# Patient Record
Sex: FEMALE | Race: BLACK OR AFRICAN AMERICAN | Employment: OTHER | URBAN - METROPOLITAN AREA
[De-identification: names, ages, dates, MRNs, and addresses within clinical notes are randomized per-mention and may not be internally consistent; named-entity substitution may affect disease eponyms.]

---

## 2017-04-13 ENCOUNTER — OFFICE VISIT (OUTPATIENT)
Dept: CARDIOLOGY CLINIC | Age: 57
End: 2017-04-13

## 2017-04-13 VITALS
WEIGHT: 276 LBS | BODY MASS INDEX: 45.98 KG/M2 | SYSTOLIC BLOOD PRESSURE: 128 MMHG | HEART RATE: 68 BPM | DIASTOLIC BLOOD PRESSURE: 60 MMHG

## 2017-04-13 DIAGNOSIS — I48.0 PAROXYSMAL ATRIAL FIBRILLATION (HCC): Primary | ICD-10-CM

## 2017-04-13 DIAGNOSIS — I10 ESSENTIAL HYPERTENSION: ICD-10-CM

## 2017-04-13 PROCEDURE — G8417 CALC BMI ABV UP PARAM F/U: HCPCS | Performed by: INTERNAL MEDICINE

## 2017-04-13 PROCEDURE — 3014F SCREEN MAMMO DOC REV: CPT | Performed by: INTERNAL MEDICINE

## 2017-04-13 PROCEDURE — G8428 CUR MEDS NOT DOCUMENT: HCPCS | Performed by: INTERNAL MEDICINE

## 2017-04-13 PROCEDURE — 3017F COLORECTAL CA SCREEN DOC REV: CPT | Performed by: INTERNAL MEDICINE

## 2017-04-13 PROCEDURE — 99214 OFFICE O/P EST MOD 30 MIN: CPT | Performed by: INTERNAL MEDICINE

## 2017-04-13 PROCEDURE — 1036F TOBACCO NON-USER: CPT | Performed by: INTERNAL MEDICINE

## 2017-04-13 PROCEDURE — 93000 ELECTROCARDIOGRAM COMPLETE: CPT | Performed by: INTERNAL MEDICINE

## 2017-04-20 DIAGNOSIS — I48.0 PAROXYSMAL ATRIAL FIBRILLATION (HCC): ICD-10-CM

## 2017-04-20 DIAGNOSIS — I10 ESSENTIAL HYPERTENSION: ICD-10-CM

## 2017-04-20 LAB
ALBUMIN SERPL-MCNC: 4.4 G/DL (ref 3.4–5)
ALP BLD-CCNC: 98 U/L (ref 40–129)
ALT SERPL-CCNC: 11 U/L (ref 10–40)
AST SERPL-CCNC: 9 U/L (ref 15–37)
BILIRUB SERPL-MCNC: <0.2 MG/DL (ref 0–1)
BILIRUBIN DIRECT: <0.2 MG/DL (ref 0–0.3)
BILIRUBIN, INDIRECT: ABNORMAL MG/DL (ref 0–1)
T4 FREE: 1.2 NG/DL (ref 0.9–1.8)
TOTAL PROTEIN: 7 G/DL (ref 6.4–8.2)

## 2017-04-24 ENCOUNTER — HOSPITAL ENCOUNTER (OUTPATIENT)
Dept: OTHER | Age: 57
Discharge: HOME | End: 2017-04-26
Attending: CLINICAL NURSE SPECIALIST | Admitting: CLINICAL NURSE SPECIALIST

## 2017-04-24 VITALS
RESPIRATION RATE: 18 BRPM | SYSTOLIC BLOOD PRESSURE: 174 MMHG | TEMPERATURE: 97.6 F | DIASTOLIC BLOOD PRESSURE: 76 MMHG | HEART RATE: 69 BPM

## 2017-04-24 LAB
ABO/RH: NORMAL
ANTIBODY IDENTIFICATION: NORMAL
ANTIBODY SCREEN: NORMAL
BASOPHILS ABSOLUTE: 0 K/UL (ref 0–0.2)
BASOPHILS RELATIVE PERCENT: 0.3 %
EOSINOPHILS ABSOLUTE: 0.1 K/UL (ref 0–0.6)
EOSINOPHILS RELATIVE PERCENT: 1.1 %
HCT VFR BLD CALC: 24.9 % (ref 36–48)
HEMOGLOBIN: 7.6 G/DL (ref 12–16)
LYMPHOCYTES ABSOLUTE: 1.8 K/UL (ref 1–5.1)
LYMPHOCYTES RELATIVE PERCENT: 19.1 %
MCH RBC QN AUTO: 26.4 PG (ref 26–34)
MCHC RBC AUTO-ENTMCNC: 30.6 G/DL (ref 31–36)
MCV RBC AUTO: 86.2 FL (ref 80–100)
MONOCYTES ABSOLUTE: 0.6 K/UL (ref 0–1.3)
MONOCYTES RELATIVE PERCENT: 6.3 %
NEUTROPHILS ABSOLUTE: 6.7 K/UL (ref 1.7–7.7)
NEUTROPHILS RELATIVE PERCENT: 73.2 %
PDW BLD-RTO: 17.5 % (ref 12.4–15.4)
PLATELET # BLD: 272 K/UL (ref 135–450)
PMV BLD AUTO: 8.3 FL (ref 5–10.5)
RBC # BLD: 2.89 M/UL (ref 4–5.2)
WBC # BLD: 9.2 K/UL (ref 4–11)

## 2017-04-24 RX ORDER — 0.9 % SODIUM CHLORIDE 0.9 %
250 INTRAVENOUS SOLUTION INTRAVENOUS ONCE
Status: COMPLETED | OUTPATIENT
Start: 2017-04-24 | End: 2017-04-25

## 2017-04-24 RX ADMIN — Medication 250 ML: at 13:00

## 2017-04-28 LAB
BLOOD BANK DISPENSE STATUS: NORMAL
BLOOD BANK DISPENSE STATUS: NORMAL
BLOOD BANK PRODUCT CODE: NORMAL
BLOOD BANK PRODUCT CODE: NORMAL
BPU ID: NORMAL
BPU ID: NORMAL
DESCRIPTION BLOOD BANK: NORMAL
DESCRIPTION BLOOD BANK: NORMAL

## 2017-05-08 ENCOUNTER — HOSPITAL ENCOUNTER (OUTPATIENT)
Dept: NUCLEAR MEDICINE | Age: 57
Discharge: HOME | End: 2017-05-08
Attending: CLINICAL NURSE SPECIALIST | Admitting: CLINICAL NURSE SPECIALIST

## 2017-05-08 DIAGNOSIS — D64.9 ANEMIA, UNSPECIFIED TYPE: ICD-10-CM

## 2017-05-08 DIAGNOSIS — D64.9 ANEMIA: ICD-10-CM

## 2017-05-08 RX ADMIN — Medication 10 MILLICURIE: at 09:21

## 2017-05-21 PROBLEM — N39.0 UTI (URINARY TRACT INFECTION): Status: ACTIVE | Noted: 2017-05-21

## 2017-05-24 PROBLEM — D64.9 ANEMIA: Status: ACTIVE | Noted: 2017-05-24

## 2017-06-13 ENCOUNTER — HOSPITAL ENCOUNTER (OUTPATIENT)
Dept: ULTRASOUND IMAGING | Age: 57
Discharge: HOME | End: 2017-06-13
Attending: FAMILY MEDICINE | Admitting: FAMILY MEDICINE

## 2017-06-13 DIAGNOSIS — M25.511 ACUTE PAIN OF BOTH SHOULDERS: ICD-10-CM

## 2017-06-13 DIAGNOSIS — D64.9 ANEMIA: ICD-10-CM

## 2017-06-13 DIAGNOSIS — M25.562 LEFT KNEE PAIN, UNSPECIFIED CHRONICITY: ICD-10-CM

## 2017-06-13 DIAGNOSIS — M25.512 ACUTE PAIN OF BOTH SHOULDERS: ICD-10-CM

## 2017-07-12 RX ORDER — AMIODARONE HYDROCHLORIDE 200 MG/1
TABLET ORAL
Qty: 30 TABLET | Refills: 0 | Status: SHIPPED | OUTPATIENT
Start: 2017-07-12 | End: 2017-08-03 | Stop reason: SDUPTHER

## 2017-07-20 ENCOUNTER — OFFICE VISIT (OUTPATIENT)
Dept: CARDIOLOGY CLINIC | Age: 57
End: 2017-07-20

## 2017-07-20 VITALS
BODY MASS INDEX: 44.1 KG/M2 | HEART RATE: 64 BPM | SYSTOLIC BLOOD PRESSURE: 138 MMHG | WEIGHT: 265 LBS | DIASTOLIC BLOOD PRESSURE: 60 MMHG

## 2017-07-20 DIAGNOSIS — R94.31 ABNORMAL EKG: ICD-10-CM

## 2017-07-20 DIAGNOSIS — I48.0 PAROXYSMAL ATRIAL FIBRILLATION (HCC): Primary | ICD-10-CM

## 2017-07-20 DIAGNOSIS — Z01.810 PRE-OPERATIVE CARDIOVASCULAR EXAMINATION: ICD-10-CM

## 2017-07-20 DIAGNOSIS — I10 ESSENTIAL HYPERTENSION: ICD-10-CM

## 2017-07-20 PROCEDURE — 3017F COLORECTAL CA SCREEN DOC REV: CPT | Performed by: INTERNAL MEDICINE

## 2017-07-20 PROCEDURE — 3014F SCREEN MAMMO DOC REV: CPT | Performed by: INTERNAL MEDICINE

## 2017-07-20 PROCEDURE — G8417 CALC BMI ABV UP PARAM F/U: HCPCS | Performed by: INTERNAL MEDICINE

## 2017-07-20 PROCEDURE — G8427 DOCREV CUR MEDS BY ELIG CLIN: HCPCS | Performed by: INTERNAL MEDICINE

## 2017-07-20 PROCEDURE — 99214 OFFICE O/P EST MOD 30 MIN: CPT | Performed by: INTERNAL MEDICINE

## 2017-07-20 PROCEDURE — 1036F TOBACCO NON-USER: CPT | Performed by: INTERNAL MEDICINE

## 2017-08-07 RX ORDER — AMIODARONE HYDROCHLORIDE 200 MG/1
TABLET ORAL
Qty: 30 TABLET | Refills: 6 | Status: SHIPPED | OUTPATIENT
Start: 2017-08-07 | End: 2018-05-08 | Stop reason: SDUPTHER

## 2017-09-28 ENCOUNTER — TELEPHONE (OUTPATIENT)
Dept: CARDIOLOGY CLINIC | Age: 57
End: 2017-09-28

## 2017-11-27 ENCOUNTER — OFFICE VISIT (OUTPATIENT)
Dept: CARDIOLOGY CLINIC | Age: 57
End: 2017-11-27

## 2017-11-27 VITALS
SYSTOLIC BLOOD PRESSURE: 120 MMHG | WEIGHT: 265 LBS | DIASTOLIC BLOOD PRESSURE: 60 MMHG | BODY MASS INDEX: 44.1 KG/M2 | HEART RATE: 58 BPM

## 2017-11-27 DIAGNOSIS — I10 ESSENTIAL HYPERTENSION: ICD-10-CM

## 2017-11-27 DIAGNOSIS — R94.31 ABNORMAL EKG: ICD-10-CM

## 2017-11-27 DIAGNOSIS — I48.0 PAROXYSMAL ATRIAL FIBRILLATION (HCC): Primary | ICD-10-CM

## 2017-11-27 PROCEDURE — 99214 OFFICE O/P EST MOD 30 MIN: CPT | Performed by: INTERNAL MEDICINE

## 2017-11-27 PROCEDURE — G8484 FLU IMMUNIZE NO ADMIN: HCPCS | Performed by: INTERNAL MEDICINE

## 2017-11-27 PROCEDURE — G8417 CALC BMI ABV UP PARAM F/U: HCPCS | Performed by: INTERNAL MEDICINE

## 2017-11-27 PROCEDURE — 1036F TOBACCO NON-USER: CPT | Performed by: INTERNAL MEDICINE

## 2017-11-27 PROCEDURE — 3014F SCREEN MAMMO DOC REV: CPT | Performed by: INTERNAL MEDICINE

## 2017-11-27 PROCEDURE — 3017F COLORECTAL CA SCREEN DOC REV: CPT | Performed by: INTERNAL MEDICINE

## 2017-11-27 PROCEDURE — G8427 DOCREV CUR MEDS BY ELIG CLIN: HCPCS | Performed by: INTERNAL MEDICINE

## 2017-11-27 NOTE — PROGRESS NOTES
Subjective:      Patient ID: Jose Michaels is a 59 y.o. female. HPI Mrs. Jenny Keys is here today for follow up  for afib/BP/abn ekg. Rhythm stable. No tachycardia. No syncope. BP good. No pnd or orthopnea. No palp/syncope. Broke leg after falling stairs. Past Medical History:   Diagnosis Date    Chronic kidney disease     Diabetes mellitus ([facility] [address]     Hyperlipidemia     Hypertension     MDRO (multiple drug resistant organisms) resistance 6/3/2016    E Coli-urine     Past Surgical History:   Procedure Laterality Date    COLONOSCOPY      ENDOSCOPY, COLON, DIAGNOSTIC      TUBAL LIGATION           Allergies   Allergen Reactions    Warfarin And Related      Other          Social History     Social History    Marital status: Single     Spouse name: N/A    Number of children: N/A    Years of education: N/A     Occupational History    Not on file. Social History Main Topics    Smoking status: Former Smoker     Types: Cigarettes     Quit date: 12/30/2016    Smokeless tobacco: Not on file    Alcohol use Yes      Comment: about 1 drink weekly    Drug use: No    Sexual activity: Not on file     Other Topics Concern    Not on file     Social History Narrative    No narrative on file        [facility] reviewed      Patient  has a past medical history of Chronic kidney disease; Diabetes mellitus ([facility] [address] Hyperlipidemia; Hypertension; and MDRO (multiple drug resistant organisms) resistance. Current Outpatient Prescriptions   Medication Sig Dispense Refill    albuterol sulfate HFA (PROVENTIL HFA) 108 (90 Base) MCG/ACT inhaler Inhale 2 puffs into the lungs every 4 hours as needed for Wheezing or Shortness of Breath (Space out to every 6 hours as symptoms improve) Space out to every 6 hours as symptoms improve.  1 Inhaler 0    amiodarone (CORDARONE) 200 MG tablet TAKE 1 TABLET BY MOUTH DAILY 30 tablet 6    insulin glargine (LANTUS) 100 UNIT/ML injection vial Inject 30 Units into the skin every morning 1 vial 3    insulin glargine (LANTUS) 100 UNIT/ML injection vial Inject 50 Units into the skin nightly 1 vial 3    Blood Glucose Monitoring Suppl (ACURA BLOOD GLUCOSE METER) W/DEVICE KIT 1 kit by Does not apply route as needed (Glucose check) Per insurance coverage 1 kit 3    Glucose Blood (BLOOD GLUCOSE TEST STRIPS) STRP Per insurance coverage. 100 strip 3    Accu-Chek Multiclix Lancets MISC 1 Package by Does not apply route as needed (Glucose check) Per insurance coverage 100 each 3    HYDROcodone-acetaminophen (NORCO) 7.5-325 MG per tablet Take 1 tablet by mouth every 6 hours as needed for Pain .  latanoprost (XALATAN) 0.005 % ophthalmic solution Place 1 drop into both eyes nightly      ferrous sulfate 325 (65 FE) MG tablet Take 325 mg by mouth daily (with breakfast)      losartan (COZAAR) 100 MG tablet TAKE ONE TABLET BY MOUTH NIGHTLY 30 tablet 6    amLODIPine (NORVASC) 5 MG tablet Take 1 tablet by mouth daily 30 tablet 3    carvedilol (COREG) 25 MG tablet Take 1 tablet by mouth 2 times daily (Patient taking differently: Take 12.5 mg by mouth 2 times daily ) 60 tablet 2    famotidine (PEPCID) 40 MG tablet Take 1 tablet by mouth 2 times daily 60 tablet 3    atorvastatin (LIPITOR) 40 MG tablet Take 40 mg by mouth daily      gabapentin (NEURONTIN) 800 MG tablet Take 800 mg by mouth 3 times daily       No current facility-administered medications for this visit. Vitals  Weight:    Blood Pressure:     Pulse:           Review of Systems   Constitutional: Negative for activity change, appetite change and fatigue. Respiratory: Negative for cough, choking, chest tightness and shortness of breath. Cardiovascular: Negative for chest pain, palpitations and leg swelling. Denies PND or orthopnea. No tachycardia or syncope. Neurological: Negative for dizziness, syncope and headaches. Psychiatric/Behavioral: Negative for agitation, behavioral problems and confusion.    Other systems

## 2018-02-06 ENCOUNTER — OFFICE VISIT (OUTPATIENT)
Dept: CARDIOLOGY CLINIC | Age: 58
End: 2018-02-06

## 2018-02-06 VITALS
SYSTOLIC BLOOD PRESSURE: 120 MMHG | BODY MASS INDEX: 46.39 KG/M2 | WEIGHT: 278.8 LBS | DIASTOLIC BLOOD PRESSURE: 70 MMHG | HEART RATE: 60 BPM

## 2018-02-06 DIAGNOSIS — I10 ESSENTIAL HYPERTENSION: ICD-10-CM

## 2018-02-06 DIAGNOSIS — I48.0 PAROXYSMAL ATRIAL FIBRILLATION (HCC): Primary | ICD-10-CM

## 2018-02-06 DIAGNOSIS — R94.31 ABNORMAL EKG: ICD-10-CM

## 2018-02-06 PROCEDURE — G8484 FLU IMMUNIZE NO ADMIN: HCPCS | Performed by: INTERNAL MEDICINE

## 2018-02-06 PROCEDURE — 3014F SCREEN MAMMO DOC REV: CPT | Performed by: INTERNAL MEDICINE

## 2018-02-06 PROCEDURE — 99214 OFFICE O/P EST MOD 30 MIN: CPT | Performed by: INTERNAL MEDICINE

## 2018-02-06 PROCEDURE — G8427 DOCREV CUR MEDS BY ELIG CLIN: HCPCS | Performed by: INTERNAL MEDICINE

## 2018-02-06 PROCEDURE — G8417 CALC BMI ABV UP PARAM F/U: HCPCS | Performed by: INTERNAL MEDICINE

## 2018-02-06 PROCEDURE — 1036F TOBACCO NON-USER: CPT | Performed by: INTERNAL MEDICINE

## 2018-02-06 PROCEDURE — 3017F COLORECTAL CA SCREEN DOC REV: CPT | Performed by: INTERNAL MEDICINE

## 2018-02-06 NOTE — PROGRESS NOTES
vial 3    Blood Glucose Monitoring Suppl (ACURA BLOOD GLUCOSE METER) W/DEVICE KIT 1 kit by Does not apply route as needed (Glucose check) Per insurance coverage 1 kit 3    Glucose Blood (BLOOD GLUCOSE TEST STRIPS) STRP Per insurance coverage. 100 strip 3    Accu-Chek Multiclix Lancets MISC 1 Package by Does not apply route as needed (Glucose check) Per insurance coverage 100 each 3    HYDROcodone-acetaminophen (NORCO) 7.5-325 MG per tablet Take 1 tablet by mouth every 6 hours as needed for Pain .  latanoprost (XALATAN) 0.005 % ophthalmic solution Place 1 drop into both eyes nightly      ferrous sulfate 325 (65 FE) MG tablet Take 325 mg by mouth daily (with breakfast)      losartan (COZAAR) 100 MG tablet TAKE ONE TABLET BY MOUTH NIGHTLY 30 tablet 6    amLODIPine (NORVASC) 5 MG tablet Take 1 tablet by mouth daily 30 tablet 3    carvedilol (COREG) 25 MG tablet Take 1 tablet by mouth 2 times daily 60 tablet 2    famotidine (PEPCID) 40 MG tablet Take 1 tablet by mouth 2 times daily 60 tablet 3    atorvastatin (LIPITOR) 40 MG tablet Take 40 mg by mouth daily      gabapentin (NEURONTIN) 800 MG tablet Take 800 mg by mouth 3 times daily      insulin glargine (LANTUS) 100 UNIT/ML injection vial Inject 50 Units into the skin nightly 1 vial 3     No current facility-administered medications for this visit. Vitals  Weight: 278 lb 12.8 oz (126.5 kg)  Blood Pressure: BP: (120)/(70)    Pulse: 60         Review of Systems   Constitutional: Negative for activity change, appetite change and fatigue. Respiratory: Negative for cough, choking, chest tightness and shortness of breath. Cardiovascular: Negative for chest pain, palpitations and leg swelling. Denies PND or orthopnea. No tachycardia or syncope. Neurological: Negative for dizziness, syncope and headaches. Psychiatric/Behavioral: Negative for agitation, behavioral problems and confusion. Other systems reviewed negative as done.

## 2018-05-08 ENCOUNTER — OFFICE VISIT (OUTPATIENT)
Dept: CARDIOLOGY CLINIC | Age: 58
End: 2018-05-08

## 2018-05-08 VITALS
WEIGHT: 285 LBS | DIASTOLIC BLOOD PRESSURE: 70 MMHG | BODY MASS INDEX: 47.43 KG/M2 | HEART RATE: 70 BPM | SYSTOLIC BLOOD PRESSURE: 132 MMHG

## 2018-05-08 DIAGNOSIS — I48.0 PAROXYSMAL ATRIAL FIBRILLATION (HCC): Primary | ICD-10-CM

## 2018-05-08 DIAGNOSIS — I10 ESSENTIAL HYPERTENSION: ICD-10-CM

## 2018-05-08 DIAGNOSIS — R94.31 ABNORMAL EKG: ICD-10-CM

## 2018-05-08 PROCEDURE — 1036F TOBACCO NON-USER: CPT | Performed by: INTERNAL MEDICINE

## 2018-05-08 PROCEDURE — G8417 CALC BMI ABV UP PARAM F/U: HCPCS | Performed by: INTERNAL MEDICINE

## 2018-05-08 PROCEDURE — 99214 OFFICE O/P EST MOD 30 MIN: CPT | Performed by: INTERNAL MEDICINE

## 2018-05-08 PROCEDURE — G8427 DOCREV CUR MEDS BY ELIG CLIN: HCPCS | Performed by: INTERNAL MEDICINE

## 2018-05-08 PROCEDURE — 3017F COLORECTAL CA SCREEN DOC REV: CPT | Performed by: INTERNAL MEDICINE

## 2018-05-08 RX ORDER — AMIODARONE HYDROCHLORIDE 200 MG/1
200 TABLET ORAL DAILY
Qty: 30 TABLET | Refills: 5 | Status: SHIPPED | OUTPATIENT
Start: 2018-05-08 | End: 2018-12-04 | Stop reason: SDUPTHER

## 2018-08-23 ENCOUNTER — OFFICE VISIT (OUTPATIENT)
Dept: CARDIOLOGY CLINIC | Age: 58
End: 2018-08-23

## 2018-08-23 VITALS
SYSTOLIC BLOOD PRESSURE: 150 MMHG | BODY MASS INDEX: 44.6 KG/M2 | DIASTOLIC BLOOD PRESSURE: 80 MMHG | WEIGHT: 268 LBS | HEART RATE: 70 BPM

## 2018-08-23 DIAGNOSIS — R94.31 ABNORMAL EKG: ICD-10-CM

## 2018-08-23 DIAGNOSIS — I48.0 PAROXYSMAL ATRIAL FIBRILLATION (HCC): Primary | ICD-10-CM

## 2018-08-23 DIAGNOSIS — I10 ESSENTIAL HYPERTENSION: ICD-10-CM

## 2018-08-23 PROCEDURE — 3017F COLORECTAL CA SCREEN DOC REV: CPT | Performed by: INTERNAL MEDICINE

## 2018-08-23 PROCEDURE — 1036F TOBACCO NON-USER: CPT | Performed by: INTERNAL MEDICINE

## 2018-08-23 PROCEDURE — 99214 OFFICE O/P EST MOD 30 MIN: CPT | Performed by: INTERNAL MEDICINE

## 2018-08-23 PROCEDURE — G8427 DOCREV CUR MEDS BY ELIG CLIN: HCPCS | Performed by: INTERNAL MEDICINE

## 2018-08-23 PROCEDURE — G8417 CALC BMI ABV UP PARAM F/U: HCPCS | Performed by: INTERNAL MEDICINE

## 2018-09-26 PROBLEM — N39.0 UTI (URINARY TRACT INFECTION): Status: RESOLVED | Noted: 2017-05-21 | Resolved: 2018-09-26

## 2018-11-28 ENCOUNTER — HOSPITAL ENCOUNTER (EMERGENCY)
Age: 58
Discharge: HOME | End: 2018-11-28
Attending: EMERGENCY MEDICINE
Payer: MEDICARE

## 2018-11-28 ENCOUNTER — APPOINTMENT (OUTPATIENT)
Dept: GENERAL RADIOLOGY | Age: 58
End: 2018-11-28
Payer: MEDICARE

## 2018-11-28 VITALS
SYSTOLIC BLOOD PRESSURE: 170 MMHG | DIASTOLIC BLOOD PRESSURE: 66 MMHG | HEART RATE: 68 BPM | RESPIRATION RATE: 18 BRPM | WEIGHT: 264 LBS | BODY MASS INDEX: 43.99 KG/M2 | TEMPERATURE: 98.6 F | HEIGHT: 65 IN | OXYGEN SATURATION: 99 %

## 2018-11-28 DIAGNOSIS — M25.561 RIGHT KNEE PAIN, UNSPECIFIED CHRONICITY: Primary | ICD-10-CM

## 2018-11-28 PROCEDURE — 99283 EMERGENCY DEPT VISIT LOW MDM: CPT

## 2018-11-28 PROCEDURE — 73560 X-RAY EXAM OF KNEE 1 OR 2: CPT

## 2018-11-28 RX ORDER — LIDOCAINE 50 MG/G
OINTMENT TOPICAL
Qty: 30 G | Refills: 0 | Status: SHIPPED | OUTPATIENT
Start: 2018-11-28 | End: 2019-04-04 | Stop reason: CLARIF

## 2018-11-28 ASSESSMENT — PAIN DESCRIPTION - ORIENTATION: ORIENTATION: RIGHT

## 2018-11-28 ASSESSMENT — ENCOUNTER SYMPTOMS
NAUSEA: 0
SORE THROAT: 0
WHEEZING: 0
VOMITING: 0
ABDOMINAL PAIN: 0
SHORTNESS OF BREATH: 0
COUGH: 0

## 2018-11-28 ASSESSMENT — PAIN DESCRIPTION - PAIN TYPE: TYPE: ACUTE PAIN

## 2018-11-28 ASSESSMENT — PAIN SCALES - GENERAL: PAINLEVEL_OUTOF10: 10

## 2018-11-28 ASSESSMENT — PAIN DESCRIPTION - LOCATION: LOCATION: KNEE

## 2018-11-28 ASSESSMENT — PAIN DESCRIPTION - DESCRIPTORS: DESCRIPTORS: ACHING;CONSTANT

## 2018-11-28 NOTE — ED PROVIDER NOTES
about 22 months ago. Her smoking use included Cigarettes. She has never used smokeless tobacco. She reports that she drinks alcohol. She reports that she does not use drugs. Medications     Previous Medications    ACCU-CHEK MULTICLIX LANCETS MISC    1 Package by Does not apply route as needed (Glucose check) Per insurance coverage    ALBUTEROL SULFATE HFA (PROVENTIL HFA) 108 (90 BASE) MCG/ACT INHALER    Inhale 2 puffs into the lungs every 4 hours as needed for Wheezing or Shortness of Breath (Space out to every 6 hours as symptoms improve) Space out to every 6 hours as symptoms improve. AMIODARONE (CORDARONE) 200 MG TABLET    Take 1 tablet by mouth daily    AMLODIPINE (NORVASC) 5 MG TABLET    Take 1 tablet by mouth daily    ATORVASTATIN (LIPITOR) 40 MG TABLET    Take 40 mg by mouth daily    BLOOD GLUCOSE MONITORING SUPPL (ACURA BLOOD GLUCOSE METER) W/DEVICE KIT    1 kit by Does not apply route as needed (Glucose check) Per insurance coverage    CARVEDILOL (COREG) 25 MG TABLET    Take 1 tablet by mouth 2 times daily    FAMOTIDINE (PEPCID) 40 MG TABLET    Take 1 tablet by mouth 2 times daily    FERROUS SULFATE 325 (65 FE) MG TABLET    Take 325 mg by mouth daily (with breakfast)    FUROSEMIDE (LASIX) 20 MG TABLET    Take 1 tablet by mouth daily    GABAPENTIN (NEURONTIN) 800 MG TABLET    Take 800 mg by mouth 3 times daily    GLUCOSE BLOOD (BLOOD GLUCOSE TEST STRIPS) STRP    Per insurance coverage. HYDROCODONE-ACETAMINOPHEN (NORCO) 7.5-325 MG PER TABLET    Take 1 tablet by mouth every 6 hours as needed for Pain . INSULIN GLARGINE (LANTUS) 100 UNIT/ML INJECTION VIAL    Inject 30 Units into the skin every morning    INSULIN GLARGINE (LANTUS) 100 UNIT/ML INJECTION VIAL    Inject 50 Units into the skin nightly    LATANOPROST (XALATAN) 0.005 % OPHTHALMIC SOLUTION    Place 1 drop into both eyes nightly       Allergies     She is allergic to warfarin and related.     Physical Exam     INITIAL VITALS: BP: (!) 183/67, She stepped off a curb yesterday and felt medial knee joint pain radiating to her popliteal fossa. She is able to bear weight today and walk at her baseline with her cane. No significant knee effusion, erythema, or warmth. Range of motion intact. Secondary to patient's body habitus, laxity in valgus and varus stress was inconclusive. X-ray of the knee or moderate tricompartmental effusion without obvious fracture. Most consistent with acute on chronic osteoarthritis. She will continue her home narcotic pain regimen. She was given lidocaine cream.  She provided with a prescription for a walker should she need it. She'll follow-up with her orthopedic surgeon. This patient was also evaluated by the attending physician. All care plans were discussed and agreed upon. Clinical Impression     1. Right knee pain, unspecified chronicity        Disposition     PATIENT REFERRED TO:  Maggie Spann MD  [address] [address]     Schedule an appointment as soon as possible for a visit         DISCHARGE MEDICATIONS:  New Prescriptions    LIDOCAINE (XYLOCAINE) 5 % OINTMENT    Apply topically as needed. MISC.  DEVICES (ROLLER WALKER) MISC    1 each by Does not apply route daily       DISPOSITION Decision To Discharge 11/28/2018 06:04:52 PM        Dinorah Heller PA-C  11/28/18 [id number]

## 2018-12-04 RX ORDER — AMIODARONE HYDROCHLORIDE 200 MG/1
200 TABLET ORAL DAILY
Qty: 30 TABLET | Refills: 5 | Status: SHIPPED | OUTPATIENT
Start: 2018-12-04 | End: 2019-06-09 | Stop reason: SDUPTHER

## 2019-01-29 ENCOUNTER — OFFICE VISIT (OUTPATIENT)
Dept: CARDIOLOGY CLINIC | Age: 59
End: 2019-01-29
Payer: MEDICARE

## 2019-01-29 VITALS
SYSTOLIC BLOOD PRESSURE: 130 MMHG | DIASTOLIC BLOOD PRESSURE: 70 MMHG | BODY MASS INDEX: 46.59 KG/M2 | HEART RATE: 68 BPM | WEIGHT: 280 LBS

## 2019-01-29 DIAGNOSIS — R94.31 ABNORMAL EKG: ICD-10-CM

## 2019-01-29 DIAGNOSIS — I10 ESSENTIAL HYPERTENSION: ICD-10-CM

## 2019-01-29 DIAGNOSIS — I48.0 PAROXYSMAL ATRIAL FIBRILLATION (HCC): Primary | ICD-10-CM

## 2019-01-29 PROCEDURE — G8428 CUR MEDS NOT DOCUMENT: HCPCS | Performed by: INTERNAL MEDICINE

## 2019-01-29 PROCEDURE — 1036F TOBACCO NON-USER: CPT | Performed by: INTERNAL MEDICINE

## 2019-01-29 PROCEDURE — 3017F COLORECTAL CA SCREEN DOC REV: CPT | Performed by: INTERNAL MEDICINE

## 2019-01-29 PROCEDURE — 99214 OFFICE O/P EST MOD 30 MIN: CPT | Performed by: INTERNAL MEDICINE

## 2019-01-29 PROCEDURE — G8484 FLU IMMUNIZE NO ADMIN: HCPCS | Performed by: INTERNAL MEDICINE

## 2019-01-29 PROCEDURE — G8417 CALC BMI ABV UP PARAM F/U: HCPCS | Performed by: INTERNAL MEDICINE

## 2019-01-29 RX ORDER — VARENICLINE TARTRATE 0.5 MG/1
.5-1 TABLET, FILM COATED ORAL SEE ADMIN INSTRUCTIONS
Qty: 57 TABLET | Refills: 0 | Status: SHIPPED | OUTPATIENT
Start: 2019-01-29 | End: 2019-04-15

## 2019-03-12 ENCOUNTER — INITIAL CONSULT (OUTPATIENT)
Dept: SURGERY | Age: 59
End: 2019-03-12
Payer: MEDICARE

## 2019-03-12 VITALS
SYSTOLIC BLOOD PRESSURE: 166 MMHG | OXYGEN SATURATION: 98 % | WEIGHT: 272 LBS | DIASTOLIC BLOOD PRESSURE: 79 MMHG | BODY MASS INDEX: 45.26 KG/M2 | HEART RATE: 68 BPM

## 2019-03-12 DIAGNOSIS — I87.323 CHRONIC VENOUS HTN W INFLAMMATION OF BILATERAL LOW EXTRM: ICD-10-CM

## 2019-03-12 DIAGNOSIS — M79.89 LEG SWELLING: Primary | ICD-10-CM

## 2019-03-12 DIAGNOSIS — I87.2 VENOUS INSUFFICIENCY: ICD-10-CM

## 2019-03-12 PROCEDURE — G8484 FLU IMMUNIZE NO ADMIN: HCPCS | Performed by: SURGERY

## 2019-03-12 PROCEDURE — G8427 DOCREV CUR MEDS BY ELIG CLIN: HCPCS | Performed by: SURGERY

## 2019-03-12 PROCEDURE — 3017F COLORECTAL CA SCREEN DOC REV: CPT | Performed by: SURGERY

## 2019-03-12 PROCEDURE — 99203 OFFICE O/P NEW LOW 30 MIN: CPT | Performed by: SURGERY

## 2019-03-12 PROCEDURE — G8417 CALC BMI ABV UP PARAM F/U: HCPCS | Performed by: SURGERY

## 2019-03-12 ASSESSMENT — ENCOUNTER SYMPTOMS
EYES NEGATIVE: 1
RESPIRATORY NEGATIVE: 1
ALLERGIC/IMMUNOLOGIC NEGATIVE: 1
GASTROINTESTINAL NEGATIVE: 1
COLOR CHANGE: 1

## 2019-04-04 ENCOUNTER — APPOINTMENT (OUTPATIENT)
Dept: GENERAL RADIOLOGY | Age: 59
DRG: 871 | End: 2019-04-04
Payer: MEDICARE

## 2019-04-04 ENCOUNTER — HOSPITAL ENCOUNTER (INPATIENT)
Age: 59
LOS: 5 days | Discharge: HOME | DRG: 871 | End: 2019-04-09
Attending: EMERGENCY MEDICINE | Admitting: INTERNAL MEDICINE
Payer: MEDICARE

## 2019-04-04 DIAGNOSIS — I50.9 ACUTE HEART FAILURE, UNSPECIFIED HEART FAILURE TYPE (HCC): Primary | ICD-10-CM

## 2019-04-04 DIAGNOSIS — J11.1 INFLUENZA: ICD-10-CM

## 2019-04-04 PROBLEM — I50.43 CHF (CONGESTIVE HEART FAILURE), NYHA CLASS I, ACUTE ON CHRONIC, COMBINED (HCC): Status: ACTIVE | Noted: 2019-04-04

## 2019-04-04 LAB
ALBUMIN SERPL-MCNC: 3.9 G/DL (ref 3.4–5)
ALP BLD-CCNC: 65 U/L (ref 40–129)
ALT SERPL-CCNC: 16 U/L (ref 10–40)
ANION GAP SERPL CALCULATED.3IONS-SCNC: 14 MMOL/L (ref 3–16)
AST SERPL-CCNC: 24 U/L (ref 15–37)
BASE EXCESS VENOUS: -5 (ref -3–3)
BASOPHILS ABSOLUTE: 0 K/UL (ref 0–0.2)
BASOPHILS RELATIVE PERCENT: 0.5 %
BILIRUB SERPL-MCNC: <0.2 MG/DL (ref 0–1)
BILIRUBIN DIRECT: <0.2 MG/DL (ref 0–0.3)
BILIRUBIN, INDIRECT: NORMAL MG/DL (ref 0–1)
BUN BLDV-MCNC: 26 MG/DL (ref 7–20)
CALCIUM SERPL-MCNC: 9.1 MG/DL (ref 8.3–10.6)
CHLORIDE BLD-SCNC: 106 MMOL/L (ref 99–110)
CO2: 19 MMOL/L (ref 21–32)
CREAT SERPL-MCNC: 2.3 MG/DL (ref 0.6–1.2)
EKG ATRIAL RATE: 86 BPM
EKG DIAGNOSIS: NORMAL
EKG P AXIS: 37 DEGREES
EKG P-R INTERVAL: 152 MS
EKG Q-T INTERVAL: 388 MS
EKG QRS DURATION: 82 MS
EKG QTC CALCULATION (BAZETT): 464 MS
EKG R AXIS: 66 DEGREES
EKG T AXIS: 35 DEGREES
EKG VENTRICULAR RATE: 86 BPM
EOSINOPHILS ABSOLUTE: 0 K/UL (ref 0–0.6)
EOSINOPHILS RELATIVE PERCENT: 0 %
GFR AFRICAN AMERICAN: 26
GFR NON-AFRICAN AMERICAN: 22
GLUCOSE BLD-MCNC: 159 MG/DL (ref 70–99)
GLUCOSE BLD-MCNC: 195 MG/DL (ref 70–99)
HCO3 VENOUS: 20.2 MMOL/L (ref 23–29)
HCT VFR BLD CALC: 28.4 % (ref 36–48)
HEMOGLOBIN: 9 G/DL (ref 12–16)
LACTATE: 0.51 MMOL/L (ref 0.4–2)
LIPASE: 23 U/L (ref 13–60)
LYMPHOCYTES ABSOLUTE: 1 K/UL (ref 1–5.1)
LYMPHOCYTES RELATIVE PERCENT: 11.4 %
MCH RBC QN AUTO: 27.4 PG (ref 26–34)
MCHC RBC AUTO-ENTMCNC: 31.8 G/DL (ref 31–36)
MCV RBC AUTO: 86.1 FL (ref 80–100)
MONOCYTES ABSOLUTE: 1 K/UL (ref 0–1.3)
MONOCYTES RELATIVE PERCENT: 10.8 %
NEUTROPHILS ABSOLUTE: 6.9 K/UL (ref 1.7–7.7)
NEUTROPHILS RELATIVE PERCENT: 77.3 %
O2 SAT, VEN: 86 %
PCO2, VEN: 35.8 MM HG (ref 40–50)
PDW BLD-RTO: 19.4 % (ref 12.4–15.4)
PERFORMED ON: ABNORMAL
PERFORMED ON: ABNORMAL
PH VENOUS: 7.36 (ref 7.35–7.45)
PLATELET # BLD: 224 K/UL (ref 135–450)
PMV BLD AUTO: 8 FL (ref 5–10.5)
PO2, VEN: 52 MM HG
POC SAMPLE TYPE: ABNORMAL
POTASSIUM REFLEX MAGNESIUM: 4.5 MMOL/L (ref 3.5–5.1)
PRO-BNP: 2340 PG/ML (ref 0–124)
PROCALCITONIN: 0.17 NG/ML (ref 0–0.15)
RAPID INFLUENZA  B AGN: NEGATIVE
RAPID INFLUENZA A AGN: POSITIVE
RBC # BLD: 3.3 M/UL (ref 4–5.2)
SODIUM BLD-SCNC: 139 MMOL/L (ref 136–145)
TCO2 CALC VENOUS: 21 MMOL/L
TOTAL PROTEIN: 7.7 G/DL (ref 6.4–8.2)
TROPONIN: <0.01 NG/ML
WBC # BLD: 9 K/UL (ref 4–11)

## 2019-04-04 PROCEDURE — 87804 INFLUENZA ASSAY W/OPTIC: CPT

## 2019-04-04 PROCEDURE — 94640 AIRWAY INHALATION TREATMENT: CPT

## 2019-04-04 PROCEDURE — 93005 ELECTROCARDIOGRAM TRACING: CPT | Performed by: PHYSICIAN ASSISTANT

## 2019-04-04 PROCEDURE — 85025 COMPLETE CBC W/AUTO DIFF WBC: CPT

## 2019-04-04 PROCEDURE — 83690 ASSAY OF LIPASE: CPT

## 2019-04-04 PROCEDURE — 83605 ASSAY OF LACTIC ACID: CPT

## 2019-04-04 PROCEDURE — 94667 MNPJ CHEST WALL 1ST: CPT

## 2019-04-04 PROCEDURE — 84145 PROCALCITONIN (PCT): CPT

## 2019-04-04 PROCEDURE — 2700000000 HC OXYGEN THERAPY PER DAY

## 2019-04-04 PROCEDURE — 94761 N-INVAS EAR/PLS OXIMETRY MLT: CPT

## 2019-04-04 PROCEDURE — 6370000000 HC RX 637 (ALT 250 FOR IP): Performed by: PHYSICIAN ASSISTANT

## 2019-04-04 PROCEDURE — 80048 BASIC METABOLIC PNL TOTAL CA: CPT

## 2019-04-04 PROCEDURE — 2580000003 HC RX 258: Performed by: INTERNAL MEDICINE

## 2019-04-04 PROCEDURE — 84484 ASSAY OF TROPONIN QUANT: CPT

## 2019-04-04 PROCEDURE — 87040 BLOOD CULTURE FOR BACTERIA: CPT

## 2019-04-04 PROCEDURE — 6370000000 HC RX 637 (ALT 250 FOR IP): Performed by: INTERNAL MEDICINE

## 2019-04-04 PROCEDURE — 1200000000 HC SEMI PRIVATE

## 2019-04-04 PROCEDURE — 94664 DEMO&/EVAL PT USE INHALER: CPT

## 2019-04-04 PROCEDURE — 96365 THER/PROPH/DIAG IV INF INIT: CPT

## 2019-04-04 PROCEDURE — 2580000003 HC RX 258: Performed by: PHYSICIAN ASSISTANT

## 2019-04-04 PROCEDURE — 96375 TX/PRO/DX INJ NEW DRUG ADDON: CPT

## 2019-04-04 PROCEDURE — 83880 ASSAY OF NATRIURETIC PEPTIDE: CPT

## 2019-04-04 PROCEDURE — 80076 HEPATIC FUNCTION PANEL: CPT

## 2019-04-04 PROCEDURE — 71046 X-RAY EXAM CHEST 2 VIEWS: CPT

## 2019-04-04 PROCEDURE — 82803 BLOOD GASES ANY COMBINATION: CPT

## 2019-04-04 PROCEDURE — 36415 COLL VENOUS BLD VENIPUNCTURE: CPT

## 2019-04-04 PROCEDURE — 99285 EMERGENCY DEPT VISIT HI MDM: CPT

## 2019-04-04 PROCEDURE — 96361 HYDRATE IV INFUSION ADD-ON: CPT

## 2019-04-04 PROCEDURE — 6360000002 HC RX W HCPCS: Performed by: PHYSICIAN ASSISTANT

## 2019-04-04 PROCEDURE — 6360000002 HC RX W HCPCS: Performed by: INTERNAL MEDICINE

## 2019-04-04 RX ORDER — GABAPENTIN 400 MG/1
400 CAPSULE ORAL 3 TIMES DAILY
Status: DISCONTINUED | OUTPATIENT
Start: 2019-04-04 | End: 2019-04-09 | Stop reason: HOSPADM

## 2019-04-04 RX ORDER — DOXYCYCLINE HYCLATE 50 MG/1
324 CAPSULE, GELATIN COATED ORAL 2 TIMES DAILY WITH MEALS
Status: ON HOLD | COMMUNITY
End: 2020-03-30 | Stop reason: HOSPADM

## 2019-04-04 RX ORDER — IPRATROPIUM BROMIDE AND ALBUTEROL SULFATE 2.5; .5 MG/3ML; MG/3ML
1 SOLUTION RESPIRATORY (INHALATION) ONCE
Status: COMPLETED | OUTPATIENT
Start: 2019-04-04 | End: 2019-04-04

## 2019-04-04 RX ORDER — FAMOTIDINE 20 MG/1
20 TABLET, FILM COATED ORAL DAILY
Status: DISCONTINUED | OUTPATIENT
Start: 2019-04-04 | End: 2019-04-09 | Stop reason: HOSPADM

## 2019-04-04 RX ORDER — ATORVASTATIN CALCIUM 40 MG/1
40 TABLET, FILM COATED ORAL DAILY
Status: DISCONTINUED | OUTPATIENT
Start: 2019-04-04 | End: 2019-04-09 | Stop reason: HOSPADM

## 2019-04-04 RX ORDER — SODIUM CHLORIDE 9 MG/ML
INJECTION, SOLUTION INTRAVENOUS
Status: DISCONTINUED
Start: 2019-04-04 | End: 2019-04-05

## 2019-04-04 RX ORDER — GUAIFENESIN/DEXTROMETHORPHAN 100-10MG/5
5 SYRUP ORAL EVERY 4 HOURS PRN
Status: DISCONTINUED | OUTPATIENT
Start: 2019-04-04 | End: 2019-04-09 | Stop reason: HOSPADM

## 2019-04-04 RX ORDER — FERROUS GLUCONATE 324(37.5)
324 TABLET ORAL 2 TIMES DAILY WITH MEALS
Status: DISCONTINUED | OUTPATIENT
Start: 2019-04-04 | End: 2019-04-09 | Stop reason: HOSPADM

## 2019-04-04 RX ORDER — AMIODARONE HYDROCHLORIDE 200 MG/1
200 TABLET ORAL DAILY
Status: DISCONTINUED | OUTPATIENT
Start: 2019-04-04 | End: 2019-04-09 | Stop reason: HOSPADM

## 2019-04-04 RX ORDER — ONDANSETRON 2 MG/ML
4 INJECTION INTRAMUSCULAR; INTRAVENOUS EVERY 6 HOURS PRN
Status: DISCONTINUED | OUTPATIENT
Start: 2019-04-04 | End: 2019-04-09 | Stop reason: HOSPADM

## 2019-04-04 RX ORDER — HYDROCODONE BITARTRATE AND ACETAMINOPHEN 7.5; 325 MG/1; MG/1
1 TABLET ORAL EVERY 6 HOURS PRN
Status: DISCONTINUED | OUTPATIENT
Start: 2019-04-04 | End: 2019-04-09 | Stop reason: HOSPADM

## 2019-04-04 RX ORDER — SODIUM CHLORIDE 0.9 % (FLUSH) 0.9 %
10 SYRINGE (ML) INJECTION EVERY 12 HOURS SCHEDULED
Status: DISCONTINUED | OUTPATIENT
Start: 2019-04-04 | End: 2019-04-09 | Stop reason: HOSPADM

## 2019-04-04 RX ORDER — ACETAMINOPHEN 325 MG/1
650 TABLET ORAL ONCE
Status: COMPLETED | OUTPATIENT
Start: 2019-04-04 | End: 2019-04-04

## 2019-04-04 RX ORDER — NICOTINE 21 MG/24HR
1 PATCH, TRANSDERMAL 24 HOURS TRANSDERMAL DAILY
Status: DISCONTINUED | OUTPATIENT
Start: 2019-04-04 | End: 2019-04-09 | Stop reason: HOSPADM

## 2019-04-04 RX ORDER — FUROSEMIDE 10 MG/ML
40 INJECTION INTRAMUSCULAR; INTRAVENOUS ONCE
Status: COMPLETED | OUTPATIENT
Start: 2019-04-04 | End: 2019-04-04

## 2019-04-04 RX ORDER — OSELTAMIVIR PHOSPHATE 30 MG/1
30 CAPSULE ORAL 2 TIMES DAILY
Status: DISCONTINUED | OUTPATIENT
Start: 2019-04-04 | End: 2019-04-05

## 2019-04-04 RX ORDER — FUROSEMIDE 10 MG/ML
40 INJECTION INTRAMUSCULAR; INTRAVENOUS 2 TIMES DAILY
Status: DISCONTINUED | OUTPATIENT
Start: 2019-04-04 | End: 2019-04-05

## 2019-04-04 RX ORDER — GABAPENTIN 100 MG/1
100 CAPSULE ORAL 3 TIMES DAILY
Status: DISCONTINUED | OUTPATIENT
Start: 2019-04-04 | End: 2019-04-04

## 2019-04-04 RX ORDER — CARVEDILOL 12.5 MG/1
12.5 TABLET ORAL 2 TIMES DAILY WITH MEALS
COMMUNITY

## 2019-04-04 RX ORDER — CARVEDILOL 12.5 MG/1
12.5 TABLET ORAL 2 TIMES DAILY WITH MEALS
Status: DISCONTINUED | OUTPATIENT
Start: 2019-04-04 | End: 2019-04-09 | Stop reason: HOSPADM

## 2019-04-04 RX ORDER — SODIUM CHLORIDE 0.9 % (FLUSH) 0.9 %
10 SYRINGE (ML) INJECTION PRN
Status: DISCONTINUED | OUTPATIENT
Start: 2019-04-04 | End: 2019-04-09 | Stop reason: HOSPADM

## 2019-04-04 RX ADMIN — Medication 10 ML: at 21:45

## 2019-04-04 RX ADMIN — HYDROCODONE BITARTRATE AND ACETAMINOPHEN 1 TABLET: 7.5; 325 TABLET ORAL at 17:40

## 2019-04-04 RX ADMIN — IPRATROPIUM BROMIDE AND ALBUTEROL SULFATE 1 AMPULE: .5; 3 SOLUTION RESPIRATORY (INHALATION) at 10:17

## 2019-04-04 RX ADMIN — ATORVASTATIN CALCIUM 40 MG: 40 TABLET, FILM COATED ORAL at 21:46

## 2019-04-04 RX ADMIN — ACETAMINOPHEN 650 MG: 325 TABLET ORAL at 10:54

## 2019-04-04 RX ADMIN — CEFTRIAXONE 1 G: 1 INJECTION, POWDER, FOR SOLUTION INTRAMUSCULAR; INTRAVENOUS at 12:08

## 2019-04-04 RX ADMIN — FAMOTIDINE 20 MG: 20 TABLET ORAL at 21:45

## 2019-04-04 RX ADMIN — GABAPENTIN 400 MG: 400 CAPSULE ORAL at 19:39

## 2019-04-04 RX ADMIN — SODIUM CHLORIDE, POTASSIUM CHLORIDE, SODIUM LACTATE AND CALCIUM CHLORIDE 1000 ML: 600; 310; 30; 20 INJECTION, SOLUTION INTRAVENOUS at 10:53

## 2019-04-04 RX ADMIN — CARVEDILOL 12.5 MG: 12.5 TABLET, FILM COATED ORAL at 21:45

## 2019-04-04 RX ADMIN — AMIODARONE HYDROCHLORIDE 200 MG: 200 TABLET ORAL at 21:45

## 2019-04-04 RX ADMIN — AZITHROMYCIN DIHYDRATE 500 MG: 500 INJECTION, POWDER, LYOPHILIZED, FOR SOLUTION INTRAVENOUS at 13:57

## 2019-04-04 RX ADMIN — FUROSEMIDE 40 MG: 10 INJECTION, SOLUTION INTRAMUSCULAR; INTRAVENOUS at 21:45

## 2019-04-04 RX ADMIN — OSELTAMIVIR PHOSPHATE 30 MG: 30 CAPSULE ORAL at 21:46

## 2019-04-04 RX ADMIN — FUROSEMIDE 40 MG: 10 INJECTION, SOLUTION INTRAMUSCULAR; INTRAVENOUS at 12:08

## 2019-04-04 RX ADMIN — GUAIFENESIN AND DEXTROMETHORPHAN 5 ML: 100; 10 SYRUP ORAL at 13:57

## 2019-04-04 RX ADMIN — GABAPENTIN 100 MG: 100 CAPSULE ORAL at 17:31

## 2019-04-04 RX ADMIN — INSULIN GLARGINE 50 UNITS: 100 INJECTION, SOLUTION SUBCUTANEOUS at 21:48

## 2019-04-04 ASSESSMENT — PAIN DESCRIPTION - ONSET
ONSET: ON-GOING

## 2019-04-04 ASSESSMENT — PAIN SCALES - GENERAL
PAINLEVEL_OUTOF10: 10
PAINLEVEL_OUTOF10: 10
PAINLEVEL_OUTOF10: 8

## 2019-04-04 ASSESSMENT — ENCOUNTER SYMPTOMS
ABDOMINAL PAIN: 0
WHEEZING: 0
COUGH: 1
VOMITING: 0
SORE THROAT: 0
SHORTNESS OF BREATH: 1
NAUSEA: 0

## 2019-04-04 ASSESSMENT — PAIN DESCRIPTION - DESCRIPTORS
DESCRIPTORS: CONSTANT;DISCOMFORT
DESCRIPTORS: ACHING
DESCRIPTORS: ACHING;DISCOMFORT

## 2019-04-04 ASSESSMENT — PAIN DESCRIPTION - ORIENTATION: ORIENTATION: RIGHT;LEFT;LOWER;UPPER

## 2019-04-04 ASSESSMENT — PAIN DESCRIPTION - PAIN TYPE
TYPE: CHRONIC PAIN
TYPE: ACUTE PAIN

## 2019-04-04 ASSESSMENT — PAIN DESCRIPTION - FREQUENCY
FREQUENCY: CONTINUOUS
FREQUENCY: INTERMITTENT
FREQUENCY: CONTINUOUS

## 2019-04-04 ASSESSMENT — PAIN DESCRIPTION - LOCATION
LOCATION: GENERALIZED

## 2019-04-04 ASSESSMENT — PAIN - FUNCTIONAL ASSESSMENT: PAIN_FUNCTIONAL_ASSESSMENT: INTOLERABLE, UNABLE TO DO ANY ACTIVE OR PASSIVE ACTIVITIES

## 2019-04-04 ASSESSMENT — PAIN DESCRIPTION - PROGRESSION
CLINICAL_PROGRESSION: GRADUALLY WORSENING
CLINICAL_PROGRESSION: NOT CHANGED
CLINICAL_PROGRESSION: GRADUALLY WORSENING

## 2019-04-04 NOTE — CONSULTS
Nephrology Consult Note  Patient's Name: Archana Sanchez  2:31 PM  4/4/2019    Reason for Consult:  Management of diuresis   Requesting Physician:  Eileen Cowden, MD      Chief Complaint:  SOB + fever for 2 days    History of Present Ilness:    Archana Sanchez is a 59 y.o. female with hx of CKD3 (baseline creatinine ~2.0), afib on amiodarone off AC 2/2 GI bleed, HTN, CHF w/preserved EF, and HLD who presented today with worsening shortness of breath and fever for 2 days. In the ED she was found to have an elevated proBNP and a positive rapid influenza test, and was admitted for management of acute CHF exacerbation and concern for sepsis 2/2 influenza. Nephrology was consulted for management of her diuresis in setting of acute CHF exacerbation. She reportedly has a hx of lower extremity edema, that mostly resolved upon decreasing her dose of amlodipine. She reports that she does not take any diuretics at home as they [facility] her pee all the time. \" She notes some ongoing shortness of breath, with cough that she feels has worsened over he past few days. She denies significant weight change, nausea/vomiting.      Past Medical History:   Diagnosis Date    CHF (congestive heart failure) (HCC)     Chronic kidney disease     Diabetes mellitus ([facility] [address]     Hyperlipidemia     Hypertension     MDRO (multiple drug resistant organisms) resistance 6/3/2016    E Coli-urine       Past Surgical History:   Procedure Laterality Date    COLONOSCOPY      ENDOSCOPY, COLON, DIAGNOSTIC      TUBAL LIGATION         Family History   Problem Relation Age of Onset    Diabetes Mother     Hypertension Mother     No Known Problems Father     No Known Problems Sister     No Known Problems Brother     No Known Problems Maternal Grandmother     No Known Problems Maternal Grandfather     No Known Problems Paternal Grandmother     No Known Problems Paternal Grandfather     No Known Problems Other         reports that she quit smoking about 2 years ago. Her smoking use included cigarettes. She has never used smokeless tobacco. She reports that she drinks alcohol. She reports that she does not use drugs. Allergies:  Dicumarol and Warfarin and related    Current Medications:      azithromycin (ZITHROMAX) 500 mg in dextrose 5 % 250 mL IVPB Once   vancomycin (VANCOCIN) 1,500 mg in dextrose 5 % 250 mL IVPB Once   sodium chloride 0.9 % infusion    nicotine (NICODERM CQ) 21 MG/24HR 1 patch Daily   guaiFENesin-dextromethorphan (ROBITUSSIN DM) 100-10 MG/5ML syrup 5 mL Q4H PRN       Review of Systems:   14 point ROS obtained but were negative except mentioned in HPI      Physical exam:     Vitals:  /65   Pulse 78   Temp 98.8 °F (37.1 °C) (Oral)   Resp 20   Ht 5' 5\" (1.651 m)   Wt 280 lb (127 kg)   SpO2 95%   BMI 46.59 kg/m²   Constitutional:  OAA X3 NAD  Skin: no rash, turgor wnl  Heent:  eomi, mmm  Neck: no bruits or jvd noted  Cardiovascular:  S1, S2 without m/r/g  Respiratory: decreased air movement b/l. Expiratory wheezing + coughing. Abdomen:  +bs, soft, nt, nd  Ext: trace b/l lower extremity edema  Psychiatric: mood and affect appropriate  Musculoskeletal:  Rom, muscular strength intact    Data:   Labs:  CBC:   Recent Labs     04/04/19  1051   WBC 9.0   HGB 9.0*        BMP:  Recent Labs     04/04/19  1051      K 4.5      CO2 19*   BUN 26*   CREATININE 2.3*   GLUCOSE 159*     Ca/Mg/Phos: Recent Labs     04/04/19  1051   CALCIUM 9.1     Hepatic: Recent Labs     04/04/19  1051   AST 24   ALT 16   BILITOT <0.2   ALKPHOS 65     Troponin:   Recent Labs     04/04/19  1051   TROPONINI <0.01     BNP: No results for input(s): BNP in the last 72 hours. Lipids: No results for input(s): CHOL, TRIG, HDL, LDLCALC, LABVLDL in the last 72 hours. ABGs: No results for input(s): PHART, PO2ART, FDR9MBF in the last 72 hours. INR: No results for input(s): INR in the last 72 hours.   UA:No results for input(s): Daron Manuel, Sydelle Kirk, BLOODU, PHUR, PROTEINU, UROBILINOGEN, NITRU, LEUKOCYTESUR, Bentley Magic in the last 72 hours. Urine Microscopic: No results for input(s): LABCAST, BACTERIA, COMU, HYALCAST, WBCUA, RBCUA, EPIU in the last 72 hours. Urine Culture: No results for input(s): LABURIN in the last 72 hours. Urine Chemistry: No results for input(s): Lesia Clore, PROTEINUR, NAUR in the last 72 hours. IMAGING:  XR CHEST STANDARD (2 VW)   Final Result   1. Interstitial edema, congestive heart failure. Differentials includes interstitial pneumonia.           Assessment/Plan         CKD 3  -Relatively stable creatinine over past several years  -Near baseline of 1.8-2.1, will continue to trend w/diuretic use    HTN  -BP stable last 2 readings (117-135/65)  -Coreg continued, amlodipine held given concern for sepsis    Anemia  -Patient at/above her baseline Hgb of 7-8    Acid-Base/Electrolytes  -Has hx of hyperkalemia, continue to follow   -Labs in AM      Thank you for allowing us to participate in care of [address] [place] [address] MS4    Pt seen and examined   LAURIE on CKD 3 - sec to hypovolemia   No diuretics   IVF x 24 hours

## 2019-04-04 NOTE — PROGRESS NOTES
Gabapentin 800 mg 3 times daily ordered for patient. This medication is renally eliminated. Will change to Gabapentin 400 mg 3 times daily per renal dose adjustment policy. Estimated Creatinine Clearance: 35 mL/min (A) (based on SCr of 2.3 mg/dL (H)). Pharmacy will continue to monitor renal function and adjust dose as necessary. Please call with any questions. Thanks! Marivel Villatoro, Pharm. D., [facility]  [phone]  4/4/2019 6:31 PM

## 2019-04-04 NOTE — ED PROVIDER NOTES
[address] ENCOUNTER          PHYSICIAN ASSISTANT NOTE       Date of evaluation: 4/4/2019    Chief Complaint     Cough (x 1 week ); Fever (x 1 week ); and Emesis      History of Present Illness     Grayson Esparza is a 59 y.o. female with a history of chronic kidney disease, atrial fibrillation presents with 1 week of persistent cough and new onset fever. Patient states the past week she has been coughing up phlegm. The past several days she states is been extremity difficult to breathe. Today she actually required oxygen supplementation in route by EMS to remain comfortable. She denies any chest pain or lower extremity swelling. She denies any history of heart failure. She states today she began feeling chills and sweats and has felt febrile for the past couple days but does not know her temperature. Denies any recent sick contacts. Denies abdominal pain, nausea, vomiting. Review of Systems     Review of Systems   Constitutional: Positive for chills and fever. HENT: Negative for sore throat. Eyes: Negative for visual disturbance. Respiratory: Positive for cough and shortness of breath. Negative for wheezing. Cardiovascular: Negative for chest pain and palpitations. Gastrointestinal: Negative for abdominal pain, nausea and vomiting. Musculoskeletal: Positive for myalgias. Negative for gait problem. Skin: Negative for rash. Neurological: Negative for dizziness and headaches. Past Medical, Surgical, Family, and Social History     She has a past medical history of CHF (congestive heart failure) ([facility] [address] Chronic kidney disease, Diabetes mellitus ([facility] [address] Hyperlipidemia, Hypertension, and MDRO (multiple drug resistant organisms) resistance. She has a past surgical history that includes Colonoscopy; Endoscopy, colon, diagnostic; and Tubal ligation.   Her family history includes Diabetes in her mother; Hypertension in her mother; No Known Problems in her brother, father, maternal grandfather, maternal grandmother, paternal grandfather, paternal grandmother, sister, and another family member. She reports that she quit smoking about 2 years ago. Her smoking use included cigarettes. She has never used smokeless tobacco. She reports that she drinks alcohol. She reports that she does not use drugs. Medications     Previous Medications    AMIODARONE (CORDARONE) 200 MG TABLET    TAKE 1 TABLET BY MOUTH DAILY    AMLODIPINE (NORVASC) 10 MG TABLET    Take 1 tablet by mouth daily    ATORVASTATIN (LIPITOR) 40 MG TABLET    Take 40 mg by mouth daily    CARVEDILOL (COREG) 12.5 MG TABLET    Take 12.5 mg by mouth 2 times daily (with meals)    FAMOTIDINE (PEPCID) 40 MG TABLET    Take 1 tablet by mouth 2 times daily    FERROUS GLUCONATE (FERGON) 324 (38 FE) MG TABLET    Take 324 mg by mouth 2 times daily (with meals)    GABAPENTIN (NEURONTIN) 800 MG TABLET    Take 800 mg by mouth 3 times daily    HYDROCODONE-ACETAMINOPHEN (NORCO) 7.5-325 MG PER TABLET    Take 1 tablet by mouth every 6 hours as needed for Pain . INSULIN GLARGINE (LANTUS SOLOSTAR) 100 UNIT/ML INJECTION PEN    Inject 68 Units into the skin nightly    MISC. DEVICES (ROLLER WALKER) MISC    1 each by Does not apply route daily    VARENICLINE (CHANTIX) 0.5 MG TABLET    Take 1-2 tablets by mouth See Admin Instructions 0.5mg DAILY for 3 days followed by 0.5mg TWICE DAILY for 4 days followed by 1mg DAILY       Allergies     She is allergic to dicumarol and warfarin and related. Physical Exam     INITIAL VITALS: BP: (!) 174/65, Temp: 102.4 °F (39.1 °C), Pulse: 82, Resp: 22, SpO2: (!) 82 %     General: Well appearing, well nourished, in no apparent state of distress. HEENT:  Normocephalic, atraumatic. Pupils equal, sclera white. Handling secretions without difficulty. Neck: No meningismus. Trachea midline    Pulmonary: Respirations even. Non labored. No tachypnea.  Course expiratory wheezing with scattered rhonchi 1.0 - 5.1 K/uL    Monocytes # 1.0 0.0 - 1.3 K/uL    Eosinophils # 0.0 0.0 - 0.6 K/uL    Basophils # 0.0 0.0 - 0.2 K/uL   Basic Metabolic Panel w/ Reflex to MG   Result Value Ref Range    Sodium 139 136 - 145 mmol/L    Potassium reflex Magnesium 4.5 3.5 - 5.1 mmol/L    Chloride 106 99 - 110 mmol/L    CO2 19 (L) 21 - 32 mmol/L    Anion Gap 14 3 - 16    Glucose 159 (H) 70 - 99 mg/dL    BUN 26 (H) 7 - 20 mg/dL    CREATININE 2.3 (H) 0.6 - 1.2 mg/dL    GFR Non-African American 22 (A) >60    GFR  26 (A) >60    Calcium 9.1 8.3 - 10.6 mg/dL   Hepatic Function Panel   Result Value Ref Range    Total Protein 7.7 6.4 - 8.2 g/dL    Alb 3.9 3.4 - 5.0 g/dL    Alkaline Phosphatase 65 40 - 129 U/L    ALT 16 10 - 40 U/L    AST 24 15 - 37 U/L    Total Bilirubin <0.2 0.0 - 1.0 mg/dL    Bilirubin, Direct <0.2 0.0 - 0.3 mg/dL    Bilirubin, Indirect see below 0.0 - 1.0 mg/dL   Lipase   Result Value Ref Range    Lipase 23.0 13.0 - 60.0 U/L   Brain Natriuretic Peptide   Result Value Ref Range    Pro-BNP 2,340 (H) 0 - 124 pg/mL   Troponin   Result Value Ref Range    Troponin <0.01 <0.01 ng/mL   Procalcitonin   Result Value Ref Range    Procalcitonin 0.17 (H) 0.00 - 0.15 ng/mL   POCT Venous   Result Value Ref Range    pH, Marie 7.359 7.350 - 7.450    pCO2, Marie 35.8 (L) 40.0 - 50.0 mm Hg    pO2, Marie 52 Not Established mm Hg    HCO3, Venous 20.2 (L) 23.0 - 29.0 mmol/L    Base Excess, Marie -5 (L) -3 - 3    O2 Sat, Marie 86 Not Established %    TC02 (Calc), Marie 21 Not Established mmol/L    Lactate 0.51 0.40 - 2.00 mmol/L    Sample Type MARIE     Performed on SEE BELOW    EKG 12 Lead   Result Value Ref Range    Ventricular Rate 86 BPM    Atrial Rate 86 BPM    P-R Interval 152 ms    QRS Duration 82 ms    Q-T Interval 388 ms    QTc Calculation (Bazett) 464 ms    P Axis 37 degrees    R Axis 66 degrees    T Axis 35 degrees    Diagnosis       EKG performed in ER and to be interpreted by ER physician. Confirmed by MD, ER ([id number]),  Weston Donovan

## 2019-04-04 NOTE — CONSULTS
Clinical Pharmacy Progress Note    Admit date: 4/4/2019   Patient presents to the ED with complaints of chills, cough, and SOB. CXR concerning for pneumonia. Pharmacy is consulted to dose Vancomycin x1 dose in ED per WINNIE Suarez. Ht = 65 in  Wt = 127 kg      Assessment/Plan:  · Will order Vancomycin 1.5g IV x1 for administration in ED per ER pharmacy consult. · If Vancomycin is to continue on admission and pharmacy is to manage dosing, please re-consult with admission orders.       Thanks--  Nanette Lundberg PharmKEATON., [facility]   4/4/2019 12:25 PM  Wireless: [phone]

## 2019-04-04 NOTE — ED PROVIDER NOTES
ED Attending Attestation Note     Date of evaluation: 4/4/2019    This patient was seen by the resident. I have seen and examined the patient, agree with the workup, evaluation, management and diagnosis. The care plan has been discussed. I have reviewed the ECG and concur with the resident's interpretation. My assessment reveals a well-appearing female percent with cough fever congestion and shortness of breath. Comes in with a fever to 102 cough with an excretory wheezing and rhonchorous breath sounds bilaterally. No lower extremity swelling here and concern for pneumonia may be some also mild fluid overload as well we will treat as CAP, saturations initially off of oxygen 82% on room air placed on oxygen and no respiratory distress saturating 90% on 2 L. Critical Care:  Due to the immediate potential for life-threatening deterioration due to acute hypoxic respiratory failure due to pneumonia, I spent 35 minutes providing critical care. This time is excluding time spent performing procedures.        Aruna Maldonado MD  04/04/19 1531

## 2019-04-04 NOTE — ED NOTES
Home Med List is complete. Source of medications in list is [employer] fill history, as well as confirming all medications with the patient.     Patient states that she took no meds today.      Please note:  1. Chantix -- pt did fill the Starter pack in January - but stopped after just a few days. She states she is ready, and would like to start NOW, during this visit. Please note that she should begin as if today is day #1, given the long lapse in therapy.        Alfredo Rubio PharmD., [facility]   4/4/2019 12:45 PM  Wireless: [phone]

## 2019-04-04 NOTE — H&P
Hospital Medicine History & Physical      PCP: Trevon Chapman MD    Date of Admission: 4/4/2019    Date of Service: Pt seen/examined on 4.4.19 . Patient will be admitted  in/to the hospital.    Chief Complaint:   Sob, feverx 2 days       History Of Present Illness:      59 y.o. female who presented to [facility] ed  with above complaints. Symptom onset has been acute for a time period of 2 day(s). Severity is described as mild-moderate. Course of her symptoms over time is constant and worsening. Associated with diff breathing   Not Associated with nausea   No h/o of recent travel, ill contacts,weight loss. Pain description - Location no pain . Past Medical History:          Diagnosis Date    CHF (congestive heart failure) (HCC)     Chronic kidney disease     Diabetes mellitus ([address]     Hyperlipidemia     Hypertension     MDRO (multiple drug resistant organisms) resistance 6/3/2016    E Coli-urine       Past Surgical History:          Procedure Laterality Date    COLONOSCOPY      ENDOSCOPY, COLON, DIAGNOSTIC      TUBAL LIGATION         Medications Prior to Admission:      Prior to Admission medications    Medication Sig Start Date End Date Taking? Authorizing Provider   carvedilol (COREG) 12.5 MG tablet Take 12.5 mg by mouth 2 times daily (with meals)   Yes Historical Provider, MD   insulin glargine (LANTUS SOLOSTAR) 100 UNIT/ML injection pen Inject 68 Units into the skin nightly   Yes Historical Provider, MD   ferrous gluconate (FERGON) 324 (38 Fe) MG tablet Take 324 mg by mouth 2 times daily (with meals)   Yes Historical Provider, MD   amLODIPine (NORVASC) 10 MG tablet Take 1 tablet by mouth daily 1/29/19  Yes Shari Cowan MD   amiodarone (CORDARONE) 200 MG tablet TAKE 1 TABLET BY MOUTH DAILY 12/4/18  Yes Donavan Chacon MD   HYDROcodone-acetaminophen ([facility]) 7.5-325 MG per tablet Take 1 tablet by mouth every 6 hours as needed for Pain .    Yes Historical Provider, MD famotidine (PEPCID) 40 MG tablet Take 1 tablet by mouth 2 times daily 6/12/16  Yes Leda Sarkar MD   atorvastatin (LIPITOR) 40 MG tablet Take 40 mg by mouth daily   Yes Historical Provider, MD   gabapentin (NEURONTIN) 800 MG tablet Take 800 mg by mouth 3 times daily   Yes Historical Provider, MD   varenicline (CHANTIX) 0.5 MG tablet Take 1-2 tablets by mouth See Admin Instructions 0.5mg DAILY for 3 days followed by 0.5mg TWICE DAILY for 4 days followed by 1mg DAILY 1/29/19   Jake Rachel MD   Misc. Devices (ROLLER [place]) MISC 1 each by Does not apply route daily 11/28/18   Yanni Mari PA-C       Allergies:  Dicumarol and Warfarin and related    Social History:      The patient currently lives at home       TOBACCO:   reports that she quit smoking about 2 years ago. Her smoking use included cigarettes. She has never used smokeless tobacco.  ETOH:   reports that she drinks alcohol. Family History:                Problem Relation Age of Onset    Diabetes Mother     Hypertension Mother     No Known Problems Father     No Known Problems Sister     No Known Problems Brother     No Known Problems Maternal Grandmother     No Known Problems Maternal Grandfather     No Known Problems Paternal Grandmother     No Known Problems Paternal Grandfather     No Known Problems Other          PHYSICAL EXAM PERFORMED BY ME:    /64   Pulse 78   Temp 102.4 °F (39.1 °C) (Oral)   Resp 22   Ht 5' 5\" (1.651 m)   Wt 280 lb (127 kg)   SpO2 95%   BMI 46.59 kg/m²     General appearance: comfortable, distress- in min resp   HEENT: Atraumatic, Pupils equal, round, and reactive to light. Extra ocular muscles intact. Neck: Supple, with full range of motion. No jugular venous distention.    Respiratory:  Clear to  auscultation , accessory muscle use no, Rales/Ronchi no  Cardiovascular: Regular rate and rhythm with normal S1/S2 ,  Abdomen: Soft, non-tender, non-distended with normal bowel sounds. Musculoskelatal: No clubbing, no edema bilaterally. Dorsalis pedal pulses +   And capillary refills time is  <  than 3 secs. Skin: Skin color, texture, turgor normal.     Neurologic:   Neurovascularly intact grossly non-focal.      CXR:  I have reviewed the CXR with the following interpretation: pul edema   EKG:  I have reviewed the EKG with the following interpretation: nsr     Labs:     Recent Labs     04/04/19  1051   WBC 9.0   HGB 9.0*   HCT 28.4*        Recent Labs     04/04/19  1051      K 4.5      CO2 19*   BUN 26*   CREATININE 2.3*   CALCIUM 9.1     Recent Labs     04/04/19  1051   AST 24   ALT 16   BILIDIR <0.2   BILITOT <0.2   ALKPHOS 65     No results for input(s): INR in the last 72 hours. Recent Labs     04/04/19  1051   TROPONINI <0.01     No flowsheet data found. Urinalysis:      Lab Results   Component Value Date    NITRU Negative 05/20/2017    WBCUA 10-20 05/20/2017    BACTERIA 4+ 05/20/2017    RBCUA 5-10 05/20/2017    BLOODU LARGE 05/20/2017    SPECGRAV 1.020 05/20/2017    GLUCOSEU 250 05/20/2017       Reviewed his/her  old charts from [facility] [place], dated 2017 , showed admission for gi bleed . Diagnostic Assesment and Plan :   1. Acute on chr dchf, req iv lasix, elevated bnp, cxr s/o of congestion   2. Acute influenza A, req  tamiflu  3. Chr afib, nsr now, no cp, off ac from gi bleed, on amiodarone,  4. ckd stage 3, stable, nephro on board for diuretics management   5. Morbid obesity  6. Sepsis POA( tachy, fever, + infection ) from flu, check procal  7. Remote smoker   Body mass index is 46.59 kg/m². The following items were considered in medical decision making:  Independent review of images, Review / order clinical lab tests, Review / order radiology, tests Decision to obtain old records.   DVT Prophylaxis: lvx   Diet: No diet orders on file  Code Status: Prior    PT/OT Eval Status: na   : na     Dispo - will monitor in floor   Needs to stay in hospital for sx control . I have discussed the above stated plan with the patient,   and they verbalized understanding and agreed with the plan. A total time of 15 min were exclusively devoted to discuss plan of care, and advanced care planning during this encounter. RN notified about todays plan  bed side. Flaco Felix, [address]  This chart was generated in part by using Dragon Dictation system and may contain errors related to that system including errors in grammar, punctuation, and spelling, as well as words and phrases that may be inappropriate. When dictating, effort is made to correct spelling/grammar errors. If there are any questions or concerns please feel free to contact the dictating provider for clarification. Thank you Aba Cole MD for the opportunity to be involved in this patient's care. If you have any questions or concerns please feel free to contact me at [phone].

## 2019-04-04 NOTE — ED NOTES
Pt up to [facility] with assist. Unable to obtain urine sample, as there is stool in the bedpan     Jeff Call, HARITHA  04/04/19 3328

## 2019-04-04 NOTE — PROGRESS NOTES
Lovenox 30 mg daily ordered for patient. This medication is renally eliminated. Will change to Lovenox 40 mg daily per renal dose adjustment policy. Estimated Creatinine Clearance: 35 mL/min (A) (based on SCr of 2.3 mg/dL (H)). Pharmacy will continue to monitor renal function and adjust dose as necessary. Please call with any questions. Thanks! Samantha Hoover, Pharm. D., [facility]  [phone]  4/4/2019 4:50 PM

## 2019-04-04 NOTE — PROGRESS NOTES
Patient admitted to PCU. Vitals obtained & assessment complete. Patient refusing to stand on scale until she \"get something to eat and pain medication. \" Patient oriented to room & unit. All questions answered.  Electronically signed by Lynda Leyva RN on 4/4/2019 at 5:09 PM

## 2019-04-04 NOTE — ED TRIAGE NOTES
Pt had sudden onset of fever and coughing starting last week. Pt has tried to treat at home to no avail . Today presents with O2 sat of 82 % on room air, cough and generalized body pain . Pt is noted to have a 102.4 temp. Due to O2 sat pt was placed on 2 l o2 per [place] .  Sat now 93 %

## 2019-04-04 NOTE — PROGRESS NOTES
Pepcid 20 mg 2 times daily ordered for patient. This medication is renally eliminated. Will change to Pepcid 20 mg daily per renal dose adjustment policy. Estimated Creatinine Clearance: 35 mL/min (A) (based on SCr of 2.3 mg/dL (H)). Pharmacy will continue to monitor renal function and adjust dose as necessary. Please call with any questions. Thanks! Edilberto Carrington, Pharm. D., [facility]  [phone]  4/4/2019 4:48 PM

## 2019-04-05 PROBLEM — J10.1 INFLUENZA A: Status: ACTIVE | Noted: 2019-04-05

## 2019-04-05 LAB
ANION GAP SERPL CALCULATED.3IONS-SCNC: 17 MMOL/L (ref 3–16)
BASOPHILS ABSOLUTE: 0 K/UL (ref 0–0.2)
BASOPHILS RELATIVE PERCENT: 0.3 %
BUN BLDV-MCNC: 35 MG/DL (ref 7–20)
CALCIUM SERPL-MCNC: 8.8 MG/DL (ref 8.3–10.6)
CHLORIDE BLD-SCNC: 106 MMOL/L (ref 99–110)
CO2: 21 MMOL/L (ref 21–32)
CREAT SERPL-MCNC: 3.2 MG/DL (ref 0.6–1.2)
EOSINOPHILS ABSOLUTE: 0 K/UL (ref 0–0.6)
EOSINOPHILS RELATIVE PERCENT: 0 %
GFR AFRICAN AMERICAN: 18
GFR NON-AFRICAN AMERICAN: 15
GLUCOSE BLD-MCNC: 137 MG/DL (ref 70–99)
GLUCOSE BLD-MCNC: 152 MG/DL (ref 70–99)
GLUCOSE BLD-MCNC: 156 MG/DL (ref 70–99)
GLUCOSE BLD-MCNC: 179 MG/DL (ref 70–99)
HCT VFR BLD CALC: 29.2 % (ref 36–48)
HEMOGLOBIN: 9.3 G/DL (ref 12–16)
LACTIC ACID: 0.5 MMOL/L (ref 0.4–2)
LYMPHOCYTES ABSOLUTE: 0.9 K/UL (ref 1–5.1)
LYMPHOCYTES RELATIVE PERCENT: 15.4 %
MCH RBC QN AUTO: 27.5 PG (ref 26–34)
MCHC RBC AUTO-ENTMCNC: 31.8 G/DL (ref 31–36)
MCV RBC AUTO: 86.5 FL (ref 80–100)
MONOCYTES ABSOLUTE: 0.8 K/UL (ref 0–1.3)
MONOCYTES RELATIVE PERCENT: 13.5 %
NEUTROPHILS ABSOLUTE: 4 K/UL (ref 1.7–7.7)
NEUTROPHILS RELATIVE PERCENT: 70.8 %
PDW BLD-RTO: 19 % (ref 12.4–15.4)
PERFORMED ON: ABNORMAL
PLATELET # BLD: 192 K/UL (ref 135–450)
PMV BLD AUTO: 7.9 FL (ref 5–10.5)
POTASSIUM REFLEX MAGNESIUM: 4.7 MMOL/L (ref 3.5–5.1)
RBC # BLD: 3.37 M/UL (ref 4–5.2)
SODIUM BLD-SCNC: 144 MMOL/L (ref 136–145)
WBC # BLD: 5.6 K/UL (ref 4–11)

## 2019-04-05 PROCEDURE — 83605 ASSAY OF LACTIC ACID: CPT

## 2019-04-05 PROCEDURE — 80048 BASIC METABOLIC PNL TOTAL CA: CPT

## 2019-04-05 PROCEDURE — 36415 COLL VENOUS BLD VENIPUNCTURE: CPT

## 2019-04-05 PROCEDURE — 94640 AIRWAY INHALATION TREATMENT: CPT

## 2019-04-05 PROCEDURE — 94150 VITAL CAPACITY TEST: CPT

## 2019-04-05 PROCEDURE — 94761 N-INVAS EAR/PLS OXIMETRY MLT: CPT

## 2019-04-05 PROCEDURE — 6370000000 HC RX 637 (ALT 250 FOR IP): Performed by: INTERNAL MEDICINE

## 2019-04-05 PROCEDURE — 1200000000 HC SEMI PRIVATE

## 2019-04-05 PROCEDURE — 6360000002 HC RX W HCPCS: Performed by: INTERNAL MEDICINE

## 2019-04-05 PROCEDURE — 2580000003 HC RX 258: Performed by: INTERNAL MEDICINE

## 2019-04-05 PROCEDURE — 85025 COMPLETE CBC W/AUTO DIFF WBC: CPT

## 2019-04-05 PROCEDURE — 94664 DEMO&/EVAL PT USE INHALER: CPT

## 2019-04-05 PROCEDURE — 99222 1ST HOSP IP/OBS MODERATE 55: CPT | Performed by: INTERNAL MEDICINE

## 2019-04-05 RX ORDER — OSELTAMIVIR PHOSPHATE 30 MG/1
30 CAPSULE ORAL DAILY
Status: DISCONTINUED | OUTPATIENT
Start: 2019-04-06 | End: 2019-04-09

## 2019-04-05 RX ORDER — DEXTROSE MONOHYDRATE 50 MG/ML
100 INJECTION, SOLUTION INTRAVENOUS PRN
Status: DISCONTINUED | OUTPATIENT
Start: 2019-04-05 | End: 2019-04-09 | Stop reason: HOSPADM

## 2019-04-05 RX ORDER — SODIUM CHLORIDE 9 MG/ML
1000 INJECTION, SOLUTION INTRAVENOUS CONTINUOUS
Status: ACTIVE | OUTPATIENT
Start: 2019-04-05 | End: 2019-04-05

## 2019-04-05 RX ORDER — IPRATROPIUM BROMIDE AND ALBUTEROL SULFATE 2.5; .5 MG/3ML; MG/3ML
1 SOLUTION RESPIRATORY (INHALATION)
Status: DISCONTINUED | OUTPATIENT
Start: 2019-04-05 | End: 2019-04-09

## 2019-04-05 RX ORDER — DEXTROSE MONOHYDRATE 25 G/50ML
12.5 INJECTION, SOLUTION INTRAVENOUS PRN
Status: DISCONTINUED | OUTPATIENT
Start: 2019-04-05 | End: 2019-04-09 | Stop reason: HOSPADM

## 2019-04-05 RX ORDER — NICOTINE POLACRILEX 4 MG
15 LOZENGE BUCCAL PRN
Status: DISCONTINUED | OUTPATIENT
Start: 2019-04-05 | End: 2019-04-09 | Stop reason: HOSPADM

## 2019-04-05 RX ADMIN — CARVEDILOL 12.5 MG: 12.5 TABLET, FILM COATED ORAL at 18:30

## 2019-04-05 RX ADMIN — INSULIN LISPRO 1 UNITS: 100 INJECTION, SOLUTION INTRAVENOUS; SUBCUTANEOUS at 20:37

## 2019-04-05 RX ADMIN — Medication 10 ML: at 20:36

## 2019-04-05 RX ADMIN — GUAIFENESIN AND DEXTROMETHORPHAN 5 ML: 100; 10 SYRUP ORAL at 05:41

## 2019-04-05 RX ADMIN — OSELTAMIVIR PHOSPHATE 30 MG: 30 CAPSULE ORAL at 09:06

## 2019-04-05 RX ADMIN — INSULIN LISPRO 1 UNITS: 100 INJECTION, SOLUTION INTRAVENOUS; SUBCUTANEOUS at 18:30

## 2019-04-05 RX ADMIN — GABAPENTIN 400 MG: 400 CAPSULE ORAL at 14:15

## 2019-04-05 RX ADMIN — HYDROCODONE BITARTRATE AND ACETAMINOPHEN 1 TABLET: 7.5; 325 TABLET ORAL at 14:16

## 2019-04-05 RX ADMIN — HYDROCODONE BITARTRATE AND ACETAMINOPHEN 1 TABLET: 7.5; 325 TABLET ORAL at 20:36

## 2019-04-05 RX ADMIN — SODIUM CHLORIDE 1000 ML: 9 INJECTION, SOLUTION INTRAVENOUS at 10:48

## 2019-04-05 RX ADMIN — INSULIN GLARGINE 50 UNITS: 100 INJECTION, SOLUTION SUBCUTANEOUS at 20:38

## 2019-04-05 RX ADMIN — GUAIFENESIN AND DEXTROMETHORPHAN 5 ML: 100; 10 SYRUP ORAL at 01:11

## 2019-04-05 RX ADMIN — HYDROCODONE BITARTRATE AND ACETAMINOPHEN 1 TABLET: 7.5; 325 TABLET ORAL at 06:49

## 2019-04-05 RX ADMIN — INSULIN LISPRO 1 UNITS: 100 INJECTION, SOLUTION INTRAVENOUS; SUBCUTANEOUS at 13:16

## 2019-04-05 RX ADMIN — AMIODARONE HYDROCHLORIDE 200 MG: 200 TABLET ORAL at 08:53

## 2019-04-05 RX ADMIN — Medication 10 ML: at 08:53

## 2019-04-05 RX ADMIN — IPRATROPIUM BROMIDE AND ALBUTEROL SULFATE 1 AMPULE: .5; 3 SOLUTION RESPIRATORY (INHALATION) at 15:35

## 2019-04-05 RX ADMIN — ATORVASTATIN CALCIUM 40 MG: 40 TABLET, FILM COATED ORAL at 08:53

## 2019-04-05 RX ADMIN — CARVEDILOL 12.5 MG: 12.5 TABLET, FILM COATED ORAL at 08:53

## 2019-04-05 RX ADMIN — FUROSEMIDE 40 MG: 10 INJECTION, SOLUTION INTRAMUSCULAR; INTRAVENOUS at 08:53

## 2019-04-05 RX ADMIN — GABAPENTIN 400 MG: 400 CAPSULE ORAL at 08:53

## 2019-04-05 RX ADMIN — GABAPENTIN 400 MG: 400 CAPSULE ORAL at 20:36

## 2019-04-05 RX ADMIN — HYDROCODONE BITARTRATE AND ACETAMINOPHEN 1 TABLET: 7.5; 325 TABLET ORAL at 00:43

## 2019-04-05 RX ADMIN — IPRATROPIUM BROMIDE AND ALBUTEROL SULFATE 1 AMPULE: .5; 3 SOLUTION RESPIRATORY (INHALATION) at 21:01

## 2019-04-05 RX ADMIN — FAMOTIDINE 20 MG: 20 TABLET ORAL at 08:53

## 2019-04-05 ASSESSMENT — PAIN SCALES - GENERAL
PAINLEVEL_OUTOF10: 0
PAINLEVEL_OUTOF10: 2
PAINLEVEL_OUTOF10: 7
PAINLEVEL_OUTOF10: 0
PAINLEVEL_OUTOF10: 7
PAINLEVEL_OUTOF10: 8
PAINLEVEL_OUTOF10: 0
PAINLEVEL_OUTOF10: 8
PAINLEVEL_OUTOF10: 7
PAINLEVEL_OUTOF10: 0

## 2019-04-05 ASSESSMENT — PAIN DESCRIPTION - ONSET
ONSET: ON-GOING
ONSET: ON-GOING
ONSET: AWAKENED FROM SLEEP
ONSET: AWAKENED FROM SLEEP

## 2019-04-05 ASSESSMENT — PAIN DESCRIPTION - LOCATION
LOCATION: GENERALIZED;FOOT
LOCATION: GENERALIZED

## 2019-04-05 ASSESSMENT — PAIN DESCRIPTION - DESCRIPTORS
DESCRIPTORS: ACHING;DISCOMFORT

## 2019-04-05 ASSESSMENT — PAIN DESCRIPTION - PROGRESSION
CLINICAL_PROGRESSION: NOT CHANGED

## 2019-04-05 ASSESSMENT — PAIN DESCRIPTION - PAIN TYPE
TYPE: CHRONIC PAIN

## 2019-04-05 ASSESSMENT — PAIN DESCRIPTION - ORIENTATION: ORIENTATION: RIGHT;LEFT

## 2019-04-05 ASSESSMENT — PAIN DESCRIPTION - FREQUENCY
FREQUENCY: CONTINUOUS

## 2019-04-05 NOTE — CARE COORDINATION
The [address] [place] [address]  CHF Team Conference Note      Patient ID: Glen Farooq 59 y.o. 1960    Admission Date: 4/4/2019   Admission Weight: 280 lb  Discharge Date: 4/9/2019  Discharge Weight: 255 lb    30 Day Readmit? No    Pt History and Precipitating Cause of Decompensation:    CHF (congestive heart failure) (HCC)      Chronic kidney disease      Diabetes mellitus ([facility]      Hyperlipidemia      Hypertension      MDRO (multiple drug resistant organisms) resistance 6/3/2016     E Coli-urine     Presents with SOB and fever x2 days. Flu + and in CHF exacerbation vs PNA  was found to have an elevated proBNP and a positive rapid influenza test, and was admitted for management of acute CHF exacerbation and concern for sepsis 2/2 influenza. Nephrology was consulted for management of her diuresis in setting of acute CHF exacerbation. She reportedly has a hx of lower extremity edema, that mostly resolved upon decreasing her dose of amlodipine. She reports that she does not take any diuretics at home as they [facility] her pee all the time. \" She notes some ongoing shortness of breath, with cough that she feels has worsened over he past few days. She denies significant weight change      Ejection Fraction: ECHO 6/1/2016   Normal left ventricle size, wall thickness and systolic function with an   estimated ejection fraction of 55-60%.   No regional wall motion abnormalities are seen.   Mitral annular calcification   Mild mitral regurgitation   Patient in atrial arrhythmia throughout the study   Mild tricuspid regurgitation with RVSP estimated at 40 mmHg.     Has patient been diagnosed with SHAQ: No  Is patient being treated: No    Cardiology Consulted: Yes  Heart Failure Order Set Used: No  Complete Intake and Output: Yes  Complete Daily Weights: No    BMP:  Lab Results   Component Value Date     04/05/2019     04/04/2019     01/29/2019    K 4.7 04/05/2019    K 4.5 04/04/2019    K 5.1 Low Sodium Snack Ideas for Gaining Weight   Weight Log   Food and Fluid Log   Cardiac menu   Low sodium menu   Time spent with patient:       PHARMACY  During Admission  If EF <40% ACE ordered ___, or ARB ___ or contraindication documented __EF >40%  If EF <40% Evidence-Based Beta Blocker ordered ____ or contraindication documented___ Coreg  If EF 35% or less, K <5on admit, Cr<2 (female) <2.5 (male), Aldosterone antagonist ordered_ EF >35%  Upon Discharge  If EF <40% ACE ordered ___, or ARB ___ or contraindication documented __EF >40%  If EF <40% Evidence-Based Beta Blocker ordered ____ or contraindication documented___ Coreg  If EF 35% or less, K <5on admit, Cr<2 (female) <2.5 (male), Aldosterone antagonist ordered_ EF >35%  Received Influenza Vaccine (Oct-Mar) Not documented - outside of season  DVT Prophylaxis by Day 2: Yes  Prescription drug coverage: Yes  Can afford prescription co-pay: No issues identified. Patient appears to fill home medications regularly per outpatient fill records.    Time Spent Educating: 10 minutes      NURSING  Patient Education:       Worsening HF symptoms Yes    fluid restriction  Yes    Tobacco cessation (Smoked in the last 12 months) Yes  daily weights and parameters  Yes  Avoid NSAIDS in HF Yes  written HF education given Yes     Follow up appointment Yes    ICD counseling No and ef >35%  Patient can Teach Back:   Worsening HF symptoms and when to report Yes   Weight gain to report to healthcare provider Yes   Amount of sodium to eat per day Yes  Name of their Melanie Steel Yes    Time Spent Educating: 10 minutes      Total Time Spent on Patient Education:  60minutesNo    CASE MANAGEMENT:  Assessment:      DISCHARGE PLAN:  Services at Discharge: None  Community Resources:   Equipment at Discharge:     Destination: home alone- no needs    Patient Self-Management:   Working scale at home: Yes  Reliable transportation: Yes  PCP: Yes  Advance Directive: Yes  Palliative Care Consult: Yes    Discharge Instructions:   Daily Weights: Weigh each morning at the same time and write down weights to bring to clinic visit  Goal Weight: 255 lb    Follow Up Instructions: 7 day hospital f/u scheduled  BREE Lewis CNP  Go on 4/15/2019  Heart Failure Clinic hospital follow up @ 8 am  [address] [facility]  [phone]           I approve the established CHF interdisciplinary plan of care as documented within the medical record of Adriana Adorno.        [address], [address] [place] [address]  Ext [phone]

## 2019-04-05 NOTE — CONSULTS
History of Present Illness:  Glen Farooq is a 59 y.o. patient whom we were asked by the hospitalists to see for possible CHF. Presents to ER with complaints of sob/cough/fever past 2 days. Noted cough which has been productive of phlegm. Has also had muscle aches. Noted temps up to 102 degrees. Feeling Sob over same period. No real orthopnea. Denies chest pain but ache all over when coughs. No edema. Noted in ER to have elevated BNP. Says she did not get Flu shot but her son brought flu home and she subsequently got sick      Past Medical History:   has a past medical history of CHF (congestive heart failure) ([facility] Chronic kidney disease, Diabetes mellitus ([facility] Hyperlipidemia, Hypertension, and MDRO (multiple drug resistant organisms) resistance. Surgical History:   has a past surgical history that includes Colonoscopy; Endoscopy, colon, diagnostic; and Tubal ligation. Social History:   reports that she quit smoking about 2 years ago. Her smoking use included cigarettes. She has never used smokeless tobacco. She reports that she drinks alcohol. She reports that she does not use drugs. Family History:  No evidence for sudden cardiac death or premature CAD    Home Medications:  Were reviewed and are listed in nursing record. and/or listed below  Prior to Admission medications    Medication Sig Start Date End Date Taking?  Authorizing Provider   carvedilol (COREG) 12.5 MG tablet Take 12.5 mg by mouth 2 times daily (with meals)   Yes Historical Provider, MD   insulin glargine (LANTUS SOLOSTAR) 100 UNIT/ML injection pen Inject 68 Units into the skin nightly   Yes Historical Provider, MD   ferrous gluconate (FERGON) 324 (38 Fe) MG tablet Take 324 mg by mouth 2 times daily (with meals)   Yes Historical Provider, MD   amLODIPine (NORVASC) 10 MG tablet Take 1 tablet by mouth daily 1/29/19  Yes Avinash Lawson MD   amiodarone (CORDARONE) 200 MG tablet TAKE 1 TABLET BY MOUTH DAILY 12/4/18  Yes Keyshawn Irvin MD   HYDROcodone-acetaminophen [facility]) 7.5-325 MG per tablet Take 1 tablet by mouth every 6 hours as needed for Pain . Yes Historical Provider, MD   famotidine (PEPCID) 40 MG tablet Take 1 tablet by mouth 2 times daily 6/12/16  Yes Darien Pepe MD   atorvastatin (LIPITOR) 40 MG tablet Take 40 mg by mouth daily   Yes Historical Provider, MD   gabapentin (NEURONTIN) 800 MG tablet Take 800 mg by mouth 3 times daily   Yes Historical Provider, MD   varenicline (CHANTIX) 0.5 MG tablet Take 1-2 tablets by mouth See Admin Instructions 0.5mg DAILY for 3 days followed by 0.5mg TWICE DAILY for 4 days followed by 1mg DAILY 1/29/19   Keyshawn Ivrin MD   Misc. Devices (ROLLER [place]) MISC 1 each by Does not apply route daily 11/28/18   Annalee Hart PA-C        Allergies:  Dicumarol and Warfarin and related     Review of Systems:     · Constitutional: there has been no unanticipated weight loss. There's been no change in energy level, sleep pattern, or activity level. · Eyes: No visual changes or diplopia. No scleral icterus. · ENT: No Headaches, hearing loss or vertigo. No mouth sores or sore throat. · Cardiovascular: No loss of consciousness. No hemoptysis, pleuritic pain, or phlebitis. · Respiratory: No  Wheezing. Cough/phlegm. No hematemesis. · Gastrointestinal: No abdominal pain, appetite loss, blood in stools. No change in bowel or bladder habits. · Genitourinary: No dysuria, trouble voiding, or hematuria. · Musculoskeletal:  No gait disturbance, weakness or joint complaints. Muscle aches  · Integumentary: No rash or pruritis.   · Other systems reviewed negative as done    Physical Examination:    Vitals:    04/05/19 1310   BP: 123/65   Pulse: 72   Resp: 22   Temp: 100.6 °F (38.1 °C)   SpO2: 90%    Weight: 251 lb 8.7 oz (114.1 kg)         General Appearance:  Alert, cooperative, no distress, appears stated age   Head:  Normocephalic, without obvious abnormality, atraumatic   Eyes: Conjunctiva/corneas clear       Nose: Nares normal   Throat: Lips normal   Neck: Supple, symmetrical, trachea midline       Lungs:   Decreased BS, persistent cough   Chest Wall:  No tenderness or deformity   Heart:  Regular rate and rhythm, S1, S2 normal, no murmur, rub or gallop   Abdomen:   Soft, non-tender, bowel sounds active all four quadrants,             Extremities: Extremities normal, atraumatic, no cyanosis or edema       Skin: Skin color, texture, turgor normal, no rashes or lesions   Pysch: Normal mood and affect   Neurologic: Normal gross motor and sensory exam.         Labs  CBC:   Lab Results   Component Value Date    WBC 5.6 04/05/2019    RBC 3.37 04/05/2019    HGB 9.3 04/05/2019    HCT 29.2 04/05/2019    MCV 86.5 04/05/2019    RDW 19.0 04/05/2019     04/05/2019     CMP:    Lab Results   Component Value Date     04/05/2019    K 4.7 04/05/2019     04/05/2019    CO2 21 04/05/2019    BUN 35 04/05/2019    CREATININE 3.2 04/05/2019    GFRAA 18 04/05/2019    AGRATIO 1.1 06/24/2016    LABGLOM 15 04/05/2019    GLUCOSE 137 04/05/2019    PROT 7.7 04/04/2019    CALCIUM 8.8 04/05/2019    BILITOT <0.2 04/04/2019    ALKPHOS 65 04/04/2019    AST 24 04/04/2019    ALT 16 04/04/2019     PT/INR:  No results found for: PTINR  Lab Results   Component Value Date    TROPONINI <0.01 04/04/2019       EKG:  I have reviewed EKG with the following interpretation:  Impression: NSR, WNL    Assessment  Patient Active Problem List   Diagnosis    DKA (diabetic ketoacidoses) (HCC)    Persistent atrial fibrillation (HCC)    Sepsis (HCC)    Acute renal failure (HCC)    Non-intractable vomiting with nausea    Lactic acidemia    Leukocytosis    Colitis presumed infectious    On amiodarone therapy    Hyperlipidemia    Anemia    Leg swelling    Venous insufficiency    Chronic venous htn w inflammation of bilateral low extrm    CHF (congestive heart failure), NYHA class I, acute on chronic, combined (HCC)  Influenza A         Plan:    Most likely represents Influenza and possible viral interstitial PNA as opposed to primarily CHF. Lasix ppt elevation of cr. Sx complex most consistent with influenza with fever and cough. Tx tamiflu.

## 2019-04-05 NOTE — CARE COORDINATION
Case Management Daily Note:    Current Plan of Care:    Most likely represents Influenza and possible viral interstitial PNA as opposed to primarily CHF. Lasix ppt elevation of cr. Sx complex most consistent with influenza with fever and cough. Tx: tamiflu, Iv lasix, IV fluids, Tele, IV abx          PT AM-PAC: n/a/24  Per last eval on: n/a    OT AM-PAC: n/a/24 Per last eval on:n/a    DME needs: none      Discharge Plan: home alone    Tentative Discharge Date: when medically stable    Current Barriers to Discharge: see above    Resources/Information given: n/s      Case Management Notes: Pt to return home, no needs anticipated.     Juan Antonio Billingsley RN, BSN, [address]  Case Management Department  [phone]

## 2019-04-05 NOTE — PROGRESS NOTES
input(s): INR in the last 72 hours. UA:No results for input(s): Chinyere Head, GLUCOSEU, BILIRUBINUR, Cheron Doll, BLOODU, PHUR, PROTEINU, UROBILINOGEN, NITRU, LEUKOCYTESUR, LABMICR, URINETYPE in the last 72 hours. Urine Microscopic: No results for input(s): LABCAST, BACTERIA, COMU, HYALCAST, WBCUA, RBCUA, EPIU in the last 72 hours. Urine Culture: No results for input(s): LABURIN in the last 72 hours. Urine Chemistry: No results for input(s): Corine Righter, PROTEINUR, NAUR in the last 72 hours. Objective:   Vitals: BP (!) 121/54   Pulse 74   Temp 99 °F (37.2 °C) (Oral)   Resp 20   Ht 5' 5\" (1.651 m)   Wt 251 lb 8.7 oz (114.1 kg)   SpO2 90%   BMI 41.86 kg/m²    Wt Readings from Last 3 Encounters:   04/05/19 251 lb 8.7 oz (114.1 kg)   03/12/19 272 lb (123.4 kg)   01/29/19 280 lb (127 kg)      24HR INTAKE/OUTPUT:      Intake/Output Summary (Last 24 hours) at 4/5/2019 1204  Last data filed at 4/5/2019 1047  Gross per 24 hour   Intake 1104 ml   Output 1000 ml   Net 104 ml     Constitutional:  Alert, awake, no apparent distress  NECK: supple, no JVD  Cardiovascular:  S1, S2 without m/r/g  Respiratory:  Course air movement bilaterally  Abdomen: +bs, soft, nt  Ext: no LE edema    Assessment and Plan:       IMAGING:  XR CHEST STANDARD (2 VW)   Final Result   1. Interstitial edema, congestive heart failure. Differentials includes interstitial pneumonia. Assessment/Plan     LAURIE CKD 3  -Near baseline of 1.8-2.1, now up to 3.2 following 120mg IV lasix.  New LAURIE likely 2/2 hypovolemia, with overnight hypotension  -hold diuretics at this time  -Below prior baseline dry weight of ~260-265lbs  -Low UOP, continue to monitor  -IVF x 24 hours  -AM labs     HTN  -BP stable last 2 readings (117-135/65)  -Coreg continued, amlodipine held given concern for sepsis     Anemia  -Patient above her baseline Hgb of 7-8     Acid-Base/Electrolytes  -Has hx of hyperkalemia, continue to follow   -Labs in AM    [address] Teodora Warner MS4 acted as my scribe     Pt seen and examined   Pt is hypovolemic   Has LAURIE on CKD 3   IVF for 10 hours   D/c lasix   Ur studies   Labs in ma

## 2019-04-05 NOTE — PROGRESS NOTES
Hospitalist Progress Note      PCP: Zo Parmar MD    Date of Admission: 4/4/2019  Hospital day - 1        Medications:  Reviewed    Infusion Medications    dextrose       Scheduled Medications    insulin lispro  0-6 Units Subcutaneous TID WC    insulin lispro  0-3 Units Subcutaneous Nightly    amiodarone  200 mg Oral Daily    atorvastatin  40 mg Oral Daily    carvedilol  12.5 mg Oral BID WC    famotidine  20 mg Oral Daily    ferrous gluconate  324 mg Oral BID     insulin glargine  50 Units Subcutaneous Nightly    sodium chloride flush  10 mL Intravenous 2 times per day    enoxaparin  40 mg Subcutaneous Daily    oseltamivir  30 mg Oral BID    nicotine  1 patch Transdermal Daily    gabapentin  400 mg Oral TID     PRN Meds: glucose, dextrose, glucagon (rDNA), dextrose, HYDROcodone-acetaminophen, sodium chloride flush, magnesium hydroxide, ondansetron, guaiFENesin-dextromethorphan      Intake/Output Summary (Last 24 hours) at 4/5/2019 1002  Last data filed at 4/5/2019 0845  Gross per 24 hour   Intake 864 ml   Output 700 ml   Net 164 ml         Chief Complaint: sob, + flu sx         Subjective: 4.5- feels weak, sob        ROS:  A 12 point review of symptoms is unremarkable with the exception of the chief complaint . Patient specifically denies cp  . Exam performed by me:    BP (!) 121/54   Pulse 74   Temp 99 °F (37.2 °C) (Oral)   Resp 20   Ht 5' 5\" (1.651 m)   Wt 251 lb 8.7 oz (114.1 kg)   SpO2 90%   BMI 41.86 kg/m²       General appearance: comfortable, distress- none  HEENT: Atraumatic, Pupils equal, round, and reactive to light. Extra ocular muscles intact. Neck: Supple, with full range of motion. No jugular venous distention. Respiratory:  Clear to  auscultation , accessory muscle use no, Rales/Ronchi no  Cardiovascular: Regular rate and rhythm with normal S1/S2 ,  Abdomen: Soft, non-tender, non-distended with normal bowel sounds.   Musculoskelatal: No clubbing, no  edema bilaterally. Dorsalis pedal pulses +   And capillary refills time is  <  than 3 secs. Skin: Skin color, texture, turgor normal.  Neurologic:   Neurovascularly intact grossly . Labs:   Recent Labs     04/04/19  1051 04/05/19  0457   WBC 9.0 5.6   HGB 9.0* 9.3*   HCT 28.4* 29.2*    192     Recent Labs     04/04/19  1051 04/05/19  0457    144   K 4.5 4.7    106   CO2 19* 21   BUN 26* 35*   CREATININE 2.3* 3.2*   CALCIUM 9.1 8.8     Recent Labs     04/04/19  1051   AST 24   ALT 16   BILIDIR <0.2   BILITOT <0.2   ALKPHOS 65     No results for input(s): INR in the last 72 hours. Recent Labs     04/04/19  1051   TROPONINI <0.01        Reviewed his/her  old charts from [facility], dated 2017 , showed admission for gi bleed .        Diagnostic Assesment and Plan :   1. Acute on chr dchf, req iv lasix, elevated bnp, cxr s/o of congestion , sx improved, now euvolemic, dced ivf   2. Acute influenza A, req  tamiflu day 2, still having  sx   3. Chr afib, nsr now, no cp, off ac from gi bleed, on amiodarone,  4. ckd stage 3, stable, nephro on board for diuretics/ivf  management   5. Morbid obesity  6. Sepsis POA( tachy, fever, + infection ) from flu,  procal - 0.17, 4.4.19  7. Remote smoker   Body mass index is 46.59 kg/m². The following items were considered in medical decision making:  Independent review of images, Review / order clinical lab tests, Review / order radiology, tests Decision to obtain old records. DVT Prophylaxis: hep sq   Diet: DIET GENERAL;  Code Status: Full Code    PT/OT Eval Status: . At his/her baseline . This chart was generated in part by using Dragon Dictation system and may contain errors related to that system including errors in grammar, punctuation, and spelling, as well as words and phrases that may be inappropriate. When dictating, effort is made to correct spelling/grammar errors.  If there are any questions or concerns please feel free to contact the dictating provider for clarification. I have discussed the above stated plan with the patient  and they verbalized understanding and agreed with the plan. A total time of 15 min were exclusively devoted to discuss plan of care and advanced care planning during this encounter. RN notified about todays plan  bed side. Dispo: will monitor, Discharge in few  days to home. Needs to stay in hospital for sx control . Flaco Sen MD  [phone]

## 2019-04-05 NOTE — PROGRESS NOTES
RESPIRATORY THERAPY ASSESSMENT    Name:  82 Michelle Shahid Record Number:  [id number]  Age: 59 y.o. Gender: female  : 1960  Today's Date:  2019  Room:  [address] [place] [address]    Assessment     Is the patient being admitted for a COPD or Asthma exacerbation? No   (If yes the patient will be seen every 4 hours for the first 24 hours and then reassessed)    Patient Admission Diagnosis      Allergies  Allergies   Allergen Reactions    Dicumarol      Other    Warfarin And Related Hives     Other         Minimum Predicted Vital Capacity:     850            Actual Vital Capacity:      1000              Pulmonary History:CHF/Pulmonary Edema  Home Oxygen Therapy:  room air  Home Respiratory Therapy  Current Respiratory Therapy:  Duoneb Q4w/a  Treatment Type: HHN  Medications: Albuterol/Ipratropium    Respiratory Severity Index(RSI)   Patients with orders for inhalation medications, oxygen, or any therapeutic treatment modality will be placed on Respiratory Protocol. They will be assessed with the first treatment and at least every 72 hours thereafter. The following severity scale will be used to determine frequency of treatment intervention.     Smoking History: Smoking History Less than 1ppd or less than 15 pack year = 1    Social History  Social History     Tobacco Use    Smoking status: Former Smoker     Types: Cigarettes     Last attempt to quit: 2016     Years since quittin.2    Smokeless tobacco: Never Used   Substance Use Topics    Alcohol use: Yes     Comment: about 1 drink weekly    Drug use: No       Recent Surgical History: None = 0  Past Surgical History  Past Surgical History:   Procedure Laterality Date    COLONOSCOPY      ENDOSCOPY, COLON, DIAGNOSTIC      TUBAL LIGATION         Level of Consciousness: Alert, Oriented, and Cooperative = 0    Level of Activity: Mostly sedentary, minimal walking = 2    Respiratory Pattern: Regular Pattern; RR 8-20 = 0    Breath Sounds: Absent bilaterally and/or with wheezes = 3    Sputum  Sputum Color: Tan, White, Yellow, Tenacity: Thick, Sputum How Obtained: Spontaneous cough  Cough: Strong, spontaneous, non-productive = 0    Vital Signs   /65   Pulse 72   Temp 100.6 °F (38.1 °C) (Oral)   Resp 22   Ht 5' 5\" (1.651 m)   Wt 251 lb 8.7 oz (114.1 kg)   SpO2 94%   BMI 41.86 kg/m²   SPO2 (COPD values may differ): Greater than or equal to 92% on room air = 0    Peak Flow (asthma only): not applicable = 0    RSI: 5-6 = Q4hr PRN (every four hours as needed) for dyspnea        Plan       Goals: Medication Delivery    Patient/caregiver was educated on the proper method of use for Respiratory Care Devices:  Yes      Level of patient/caregiver understanding able to:   ? Verbalize understanding   ? Demonstrate understanding       ? Teach back        ? Needs reinforcement       ? No available caregiver               ? Other:     Response to education:  Nanci Moreau     Is patient being placed on Home Treatment Regimen? No     Does the patient have everything they need prior to discharge? NA     Comments:     Plan of Care: AMBROSIO w/ Duoneb q4w/a    Electronically signed by Melani Dahl RCP on 4/5/2019 at 3:42 PM    Respiratory Protocol Guidelines     1. Assessment and treatment by Respiratory Therapy will be initiated for medication and therapeutic interventions upon initiation of aerosolized medication. 2. Physician will be contacted for respiratory rate (RR) greater than 35 breaths per minute. Therapy will be held for heart rate (HR) greater than 140 beats per minute, pending direction from physician. 3. Bronchodilators will be administered via Metered Dose Inhaler (MDI) with spacer when the following criteria are met:  a. Alert and cooperative     b. HR < 140 bpm  c. RR < 30 bpm                d. Can demonstrate a 2-3 second inspiratory hold  4.  Bronchodilators will be administered via Hand Held Nebulizer [facility] to patients when ANY of the following criteria are met  a. Incognizant or uncooperative          b. Patients treated with HHN at Home        c. Unable to demonstrate proper use of MDI with spacer     d. RR > 30 bpm   5. Bronchodilators will be delivered via Metered Dose Inhaler (MDI), HHN, Aerogen to intubated patients on mechanical ventilation. 6. Inhalation medication orders will be delivered and/or substituted as outlined below. Aerosolized Medications Ordering and Administration Guidelines:    1. All Medications will be ordered by a physician, and their frequency and/or modality will be adjusted as defined by the patients Respiratory Severity Index (RSI) score. 2. If the patient does not have documented COPD, consider discontinuing anticholinergics when RSI is less than 9.  3. If the bronchospasm worsens (increased RSI), then the bronchodilator frequency can be increased to a maximum of every 4 hours. If greater than every 4 hours is required, the physician will be contacted. 4. If the bronchospasm improves, the frequency of the bronchodilator can be decreased, based on the patient's RSI, but not less than home treatment regimen frequency. 5. Bronchodilator(s) will be discontinued if patient has a RSI less than 9 and has received no scheduled or as needed treatment for 72  Hrs. Patients Ordered on a Mucolytic Agent:    1. Must always be administered with a bronchodilator. 2. Discontinue if patient experiences worsened bronchospasm, or secretions have lessened to the point that the patient is able to clear them with a cough. Anti-inflammatory and Combination Medications:    1. If the patient lacks prior history of lung disease, is not using inhaled anti-inflammatory medication at home, and lacks wheezing by examination or by history for at least 24 hours, contact physician for possible discontinuation.

## 2019-04-05 NOTE — PROGRESS NOTES
Enoxaparin 40 mg daily ordered for patient. This medication is renally eliminated. Will change to enoxaparin 30 mg daily per renal dose adjustment policy. Estimated Creatinine Clearance: 24 mL/min (A) (based on SCr of 3.2 mg/dL (H)). Pharmacy will continue to monitor renal function and adjust dose as necessary. Please call with any questions. Thanks!   Deanna Mauricio, PharmD, [facility]  4/5/2019 11:39 AM

## 2019-04-05 NOTE — PLAN OF CARE
Problem: OXYGENATION/RESPIRATORY FUNCTION  Goal: Patient will achieve/maintain normal respiratory rate/effort  Description  Respiratory rate and effort will be within normal limits for the patient  Outcome: Ongoing  Note:   Pt respiratory rate/effort WDL this shift. Problem: Activity:  Goal: Energy level will increase  Description  Energy level will increase  Outcome: Ongoing  Note:   Pt lethargic and states she \"just wants to get some sleep\" this shift.

## 2019-04-06 LAB
GLUCOSE BLD-MCNC: 151 MG/DL (ref 70–99)
GLUCOSE BLD-MCNC: 162 MG/DL (ref 70–99)
GLUCOSE BLD-MCNC: 166 MG/DL (ref 70–99)
GLUCOSE BLD-MCNC: 97 MG/DL (ref 70–99)
PERFORMED ON: ABNORMAL
PERFORMED ON: NORMAL

## 2019-04-06 PROCEDURE — 99233 SBSQ HOSP IP/OBS HIGH 50: CPT | Performed by: INTERNAL MEDICINE

## 2019-04-06 PROCEDURE — 1200000000 HC SEMI PRIVATE

## 2019-04-06 PROCEDURE — 6370000000 HC RX 637 (ALT 250 FOR IP): Performed by: INTERNAL MEDICINE

## 2019-04-06 PROCEDURE — 2580000003 HC RX 258: Performed by: INTERNAL MEDICINE

## 2019-04-06 PROCEDURE — 94761 N-INVAS EAR/PLS OXIMETRY MLT: CPT

## 2019-04-06 PROCEDURE — 99222 1ST HOSP IP/OBS MODERATE 55: CPT | Performed by: INTERNAL MEDICINE

## 2019-04-06 PROCEDURE — 94640 AIRWAY INHALATION TREATMENT: CPT

## 2019-04-06 RX ORDER — 0.9 % SODIUM CHLORIDE 0.9 %
500 INTRAVENOUS SOLUTION INTRAVENOUS ONCE
Status: COMPLETED | OUTPATIENT
Start: 2019-04-06 | End: 2019-04-06

## 2019-04-06 RX ORDER — PREDNISONE 20 MG/1
20 TABLET ORAL DAILY
Status: DISCONTINUED | OUTPATIENT
Start: 2019-04-06 | End: 2019-04-09 | Stop reason: HOSPADM

## 2019-04-06 RX ADMIN — GUAIFENESIN AND DEXTROMETHORPHAN 5 ML: 100; 10 SYRUP ORAL at 15:49

## 2019-04-06 RX ADMIN — GABAPENTIN 400 MG: 400 CAPSULE ORAL at 20:42

## 2019-04-06 RX ADMIN — OSELTAMIVIR PHOSPHATE 30 MG: 30 CAPSULE ORAL at 09:50

## 2019-04-06 RX ADMIN — GABAPENTIN 400 MG: 400 CAPSULE ORAL at 09:50

## 2019-04-06 RX ADMIN — HYDROCODONE BITARTRATE AND ACETAMINOPHEN 1 TABLET: 7.5; 325 TABLET ORAL at 02:45

## 2019-04-06 RX ADMIN — FAMOTIDINE 20 MG: 20 TABLET ORAL at 09:50

## 2019-04-06 RX ADMIN — IPRATROPIUM BROMIDE AND ALBUTEROL SULFATE 1 AMPULE: .5; 3 SOLUTION RESPIRATORY (INHALATION) at 07:35

## 2019-04-06 RX ADMIN — FERROUS GLUCONATE TAB 324 MG (37.5 MG ELEMENTAL IRON) 324 MG: 324 (37.5 FE) TAB at 18:19

## 2019-04-06 RX ADMIN — INSULIN LISPRO 1 UNITS: 100 INJECTION, SOLUTION INTRAVENOUS; SUBCUTANEOUS at 20:43

## 2019-04-06 RX ADMIN — CARVEDILOL 12.5 MG: 12.5 TABLET, FILM COATED ORAL at 09:50

## 2019-04-06 RX ADMIN — IPRATROPIUM BROMIDE AND ALBUTEROL SULFATE 1 AMPULE: .5; 3 SOLUTION RESPIRATORY (INHALATION) at 20:54

## 2019-04-06 RX ADMIN — GABAPENTIN 400 MG: 400 CAPSULE ORAL at 13:23

## 2019-04-06 RX ADMIN — AMIODARONE HYDROCHLORIDE 200 MG: 200 TABLET ORAL at 09:50

## 2019-04-06 RX ADMIN — Medication 10 ML: at 09:51

## 2019-04-06 RX ADMIN — IPRATROPIUM BROMIDE AND ALBUTEROL SULFATE 1 AMPULE: .5; 3 SOLUTION RESPIRATORY (INHALATION) at 15:58

## 2019-04-06 RX ADMIN — CARVEDILOL 12.5 MG: 12.5 TABLET, FILM COATED ORAL at 15:51

## 2019-04-06 RX ADMIN — INSULIN LISPRO 1 UNITS: 100 INJECTION, SOLUTION INTRAVENOUS; SUBCUTANEOUS at 18:08

## 2019-04-06 RX ADMIN — GUAIFENESIN AND DEXTROMETHORPHAN 5 ML: 100; 10 SYRUP ORAL at 02:45

## 2019-04-06 RX ADMIN — HYDROCODONE BITARTRATE AND ACETAMINOPHEN 1 TABLET: 7.5; 325 TABLET ORAL at 19:34

## 2019-04-06 RX ADMIN — ATORVASTATIN CALCIUM 40 MG: 40 TABLET, FILM COATED ORAL at 09:50

## 2019-04-06 RX ADMIN — INSULIN GLARGINE 50 UNITS: 100 INJECTION, SOLUTION SUBCUTANEOUS at 20:43

## 2019-04-06 RX ADMIN — IPRATROPIUM BROMIDE AND ALBUTEROL SULFATE 1 AMPULE: .5; 3 SOLUTION RESPIRATORY (INHALATION) at 11:10

## 2019-04-06 RX ADMIN — SODIUM CHLORIDE 500 ML: 9 INJECTION, SOLUTION INTRAVENOUS at 18:07

## 2019-04-06 RX ADMIN — FERROUS GLUCONATE TAB 324 MG (37.5 MG ELEMENTAL IRON) 324 MG: 324 (37.5 FE) TAB at 13:22

## 2019-04-06 RX ADMIN — PREDNISONE 20 MG: 20 TABLET ORAL at 18:06

## 2019-04-06 RX ADMIN — INSULIN LISPRO 1 UNITS: 100 INJECTION, SOLUTION INTRAVENOUS; SUBCUTANEOUS at 13:18

## 2019-04-06 RX ADMIN — HYDROCODONE BITARTRATE AND ACETAMINOPHEN 1 TABLET: 7.5; 325 TABLET ORAL at 13:22

## 2019-04-06 ASSESSMENT — PAIN DESCRIPTION - ONSET
ONSET: ON-GOING
ONSET: ON-GOING

## 2019-04-06 ASSESSMENT — PAIN DESCRIPTION - LOCATION
LOCATION: GENERALIZED;SHOULDER
LOCATION: GENERALIZED
LOCATION: GENERALIZED

## 2019-04-06 ASSESSMENT — PAIN DESCRIPTION - PROGRESSION
CLINICAL_PROGRESSION: NOT CHANGED
CLINICAL_PROGRESSION: NOT CHANGED

## 2019-04-06 ASSESSMENT — PAIN DESCRIPTION - PAIN TYPE
TYPE: CHRONIC PAIN

## 2019-04-06 ASSESSMENT — PAIN SCALES - GENERAL
PAINLEVEL_OUTOF10: 0
PAINLEVEL_OUTOF10: 10
PAINLEVEL_OUTOF10: 10
PAINLEVEL_OUTOF10: 0
PAINLEVEL_OUTOF10: 10
PAINLEVEL_OUTOF10: 10
PAINLEVEL_OUTOF10: 7

## 2019-04-06 ASSESSMENT — PAIN DESCRIPTION - DESCRIPTORS
DESCRIPTORS: ACHING;CONSTANT
DESCRIPTORS: ACHING;DISCOMFORT

## 2019-04-06 ASSESSMENT — PAIN - FUNCTIONAL ASSESSMENT: PAIN_FUNCTIONAL_ASSESSMENT: PREVENTS OR INTERFERES SOME ACTIVE ACTIVITIES AND ADLS

## 2019-04-06 ASSESSMENT — PAIN DESCRIPTION - FREQUENCY
FREQUENCY: CONTINUOUS
FREQUENCY: CONTINUOUS

## 2019-04-06 ASSESSMENT — PAIN DESCRIPTION - ORIENTATION: ORIENTATION: LEFT

## 2019-04-06 NOTE — PROGRESS NOTES
Hospital Medicine Care  Progress Note      Chief complain; Cough (x 1 week ); Fever (x 1 week ); and Emesis           Subjective:     Complaining of shortness of breath. Complaining of cough with clear phlegm. Complaining of generalized weakness. Denies any diarrhea. Denies any nausea or vomiting. Review of Systems:     Review of Systems as mentioned above, other ros is negative. Objective:       Medications        Scheduled Meds:   insulin lispro  0-6 Units Subcutaneous TID WC    insulin lispro  0-3 Units Subcutaneous Nightly    enoxaparin  30 mg Subcutaneous Daily    ipratropium-albuterol  1 ampule Inhalation Q4H WA    oseltamivir  30 mg Oral Daily    amiodarone  200 mg Oral Daily    atorvastatin  40 mg Oral Daily    carvedilol  12.5 mg Oral BID WC    famotidine  20 mg Oral Daily    ferrous gluconate  324 mg Oral BID WC    insulin glargine  50 Units Subcutaneous Nightly    sodium chloride flush  10 mL Intravenous 2 times per day    nicotine  1 patch Transdermal Daily    gabapentin  400 mg Oral TID     Continuous Infusions:   dextrose       PRN Meds:.glucose, dextrose, glucagon (rDNA), dextrose, HYDROcodone-acetaminophen, sodium chloride flush, magnesium hydroxide, ondansetron, guaiFENesin-dextromethorphan      Allergies  Allergies   Allergen Reactions    Dicumarol      Other    Warfarin And Related Hives     Other           Vitals:    04/06/19 0358 04/06/19 0735 04/06/19 0949 04/06/19 1110   BP: 112/67  129/62    Pulse: 69  73    Resp: 20 18 20 16   Temp: 97.2 °F (36.2 °C)  99.5 °F (37.5 °C)    TempSrc: Oral  Oral    SpO2: 90% 95% (!) 86% 95%   Weight: 253 lb 4.9 oz (114.9 kg)      Height:             Physical exam:         Physical Exam   Constitutional: She appears well-developed and well-nourished. No distress. HENT:   Head: Normocephalic and atraumatic. Cardiovascular: Normal rate and regular rhythm. Exam reveals no friction rub. No murmur heard.   Pulmonary/Chest: Effort normal. No stridor. No respiratory distress. She has rales. Abdominal: Soft. Bowel sounds are normal. She exhibits no distension. There is no tenderness. Musculoskeletal: Normal range of motion. She exhibits no edema. Neurological: She is alert. No cranial nerve deficit. Coordination normal.   Skin: Skin is warm and dry. Capillary refill takes less than 2 seconds. She is not diaphoretic. Psychiatric: She has a normal mood and affect. Labs      Recent Labs     04/04/19  1051 04/05/19  0457   WBC 9.0 5.6   HGB 9.0* 9.3*   HCT 28.4* 29.2*    192   MCV 86.1 86.5        Recent Labs     04/04/19  1051 04/05/19  0457    144   K 4.5 4.7    106   CO2 19* 21   BUN 26* 35*   CREATININE 2.3* 3.2*       Recent Labs     04/04/19  1051   AST 24   ALT 16   BILIDIR <0.2   BILITOT <0.2   ALKPHOS 65       No results for input(s): MG in the last 72 hours. Radiology  XR CHEST STANDARD (2 VW)   Final Result   1. Interstitial edema, congestive heart failure. Differentials includes interstitial pneumonia. Assessment and Plan: Active Problems:    CHF (congestive heart failure), NYHA class I, acute on chronic, combined ([facility] [address]    Influenza A  Resolved Problems:    * No resolved hospital problems. *         #1 influenza A with interstitial pneumonia  Continue with the Tamiflu. T-max 100.6. Check pro-calcitonin again. #2 acute hypoxic respiratory failure  Secondary to influenza with interstitial pneumonia. Not much response with the diuretics. Suspected secondary to pneumonia. Pulmonology is consulted. #3 Sepsis POA: 2/2 PNA and influenza. Improving. #4 Obesity (Body mass index is 42.15 kg/m². ) - Complicating assessment and treatment. Placing patient at risk for multiple co-morbidities as well as early death and contributing to the patient's presentation. Counseled on weight loss.          Socorro Morgan MD  4/6/2019

## 2019-04-06 NOTE — PROGRESS NOTES
Renal Progress Note  Subjective:   Admit Date: 4/4/2019     HPI Oxana Sears is a 59 y.o. female with hx of CKD3 (baseline creatinine ~2.0), afib on amiodarone off AC 2/2 GI bleed, HTN, CHF w/preserved EF, and HLD who presented today with worsening shortness of breath and fever for 2 days. Interval History:     Good UO   Labs not done today   Feels better     DIET DIET GENERAL;  Medications:   Scheduled Meds:   insulin lispro  0-6 Units Subcutaneous TID WC    insulin lispro  0-3 Units Subcutaneous Nightly    enoxaparin  30 mg Subcutaneous Daily    ipratropium-albuterol  1 ampule Inhalation Q4H WA    oseltamivir  30 mg Oral Daily    amiodarone  200 mg Oral Daily    atorvastatin  40 mg Oral Daily    carvedilol  12.5 mg Oral BID WC    famotidine  20 mg Oral Daily    ferrous gluconate  324 mg Oral BID WC    insulin glargine  50 Units Subcutaneous Nightly    sodium chloride flush  10 mL Intravenous 2 times per day    nicotine  1 patch Transdermal Daily    gabapentin  400 mg Oral TID     Continuous Infusions:   dextrose         Labs:  CBC:   Recent Labs     04/04/19  1051 04/05/19  0457   WBC 9.0 5.6   HGB 9.0* 9.3*    192     BMP:    Recent Labs     04/04/19  1051 04/05/19  0457    144   K 4.5 4.7    106   CO2 19* 21   BUN 26* 35*   CREATININE 2.3* 3.2*   GLUCOSE 159* 137*     Ca/Mg/Phos:   Recent Labs     04/04/19  1051 04/05/19  0457   CALCIUM 9.1 8.8     Hepatic:   Recent Labs     04/04/19  1051   AST 24   ALT 16   BILITOT <0.2   ALKPHOS 65     Troponin:   Recent Labs     04/04/19  1051   TROPONINI <0.01     BNP: No results for input(s): BNP in the last 72 hours. Lipids: No results for input(s): CHOL, TRIG, HDL, LDLCALC, LABVLDL in the last 72 hours. ABGs: No results for input(s): PHART, PO2ART, NUY4DKO in the last 72 hours. INR: No results for input(s): INR in the last 72 hours.   UA:No results for input(s): [address] [facility], [address] [facility], [place], PROTEINU, UROBILINOGEN, NITRU, LEUKOCYTESUR, Roland Marielle in the last 72 hours. Urine Microscopic: No results for input(s): LABCAST, BACTERIA, COMU, HYALCAST, WBCUA, RBCUA, EPIU in the last 72 hours. Urine Culture: No results for input(s): LABURIN in the last 72 hours. Urine Chemistry: No results for input(s): Christine Current, PROTEINUR, NAUR in the last 72 hours. Objective:   Vitals: /62   Pulse 73   Temp 99.5 °F (37.5 °C) (Oral)   Resp 16   Ht 5' 5\" (1.651 m)   Wt 253 lb 4.9 oz (114.9 kg)   SpO2 95%   BMI 42.15 kg/m²    Wt Readings from Last 3 Encounters:   04/06/19 253 lb 4.9 oz (114.9 kg)   03/12/19 272 lb (123.4 kg)   01/29/19 280 lb (127 kg)      24HR INTAKE/OUTPUT:      Intake/Output Summary (Last 24 hours) at 4/6/2019 1419  Last data filed at 4/6/2019 1147  Gross per 24 hour   Intake 1680 ml   Output 0 ml   Net 1680 ml     Constitutional:  Alert, awake, no apparent distress  NECK: supple, no JVD  Cardiovascular:  S1, S2 without m/r/g  Respiratory:  Course air movement bilaterally  Abdomen: +bs, soft, nt  Ext: no LE edema    Assessment and Plan:       IMAGING:  XR CHEST STANDARD (2 VW)   Final Result   1. Interstitial edema, congestive heart failure. Differentials includes interstitial pneumonia.           Assessment/Plan     LAURIE CKD 3  - saline bolus x 1   - renal panel in am   - BP better     HTN  - controlled     Anemia  -iron studies      Acid-Base/Electrolytes  -Has hx of hyperkalemia, continue to follow   -Labs in AM    Keira Patton MD

## 2019-04-06 NOTE — PROGRESS NOTES
<0. 2   ALKPHOS 65     PT/INR: No results for input(s): PROTIME, INR in the last 72 hours. APTT: No results for input(s): APTT in the last 72 hours. BNP:  No results for input(s): BNP in the last 72 hours. IMAGING:     Assessment:  Patient Active Problem List    Diagnosis Date Noted    Anemia 05/24/2017     Priority: Low    On amiodarone therapy 09/11/2016     Priority: Low    Hyperlipidemia 09/11/2016     Priority: Low    Colitis presumed infectious      Priority: Low    Persistent atrial fibrillation ([facility] [address] 06/01/2016     Priority: Low    [facility]2016     Priority: Low    Acute renal failure (HCC)      Priority: Low    Non-intractable vomiting with nausea      Priority: Low    Lactic acidemia      Priority: Low    Leukocytosis      Priority: Low    D[facility]diabetic k[facility]acidoses) [facility]      Priority: Low    Influenza A 04/05/2019    CHF (congestive heart failure), NYHA class I, acute on chronic, combined ([facility] 04/04/2019    Leg swelling 03/12/2019    Venous insufficiency 03/12/2019    Chronic venous htn w inflammation of bilateral low extrm 03/12/2019       Plan:  1. Feels some better. Cough but better. No chest pain. T max 100. Cr pending. Continue tamiflu.         Core Measures:  · Discharge instructions:   · LVEF documented:   · ACEI for LV dysfunction:   · Smoking Cessation:    Dimitris Cardoza MD 4/6/2019 6:58 AM

## 2019-04-06 NOTE — PLAN OF CARE
Problem: HEMODYNAMIC STATUS  Goal: Patient has stable vital signs and fluid balance  Outcome: Met This Shift  Note:   VSS this shift. Problem: Pain:  Goal: Control of chronic pain  Description  Control of chronic pain  Outcome: Ongoing  Note:   Pt satisfied with pain management this shift.

## 2019-04-06 NOTE — CONSULTS
Pulmonary Consult Note      Reason for Consult: post influenza pneumonia   Requesting Physician:  Pulmonary    Subjective:     CHIEF COMPLAINT / HPI:                The patient is a 59 y.o. female with significant past medical history of Atrial fibrillation and tobbacco abuse presented with complaints of about a week of cough and fever. Patient diagnosed with influenza 2 days ago but continued to feel worse and was admitted to the hospital.  She says she was exposed by her son. She's been getting tamiflu and breathing treatments which she says has helped her some. She still gets dizzy when she stands up and is on a couple liters of oxygen which is not her norm. Past Medical History:      Diagnosis Date    [facility]     Chronic kidney disease     Diabetes mellitus ([facility] [facility]     Hyperlipidemia     Hypertension     MDRO (multiple drug resistant organisms) resistance 6/3/2016    E Coli-urine      Past Surgical History:        Procedure Laterality Date    COLONOSCOPY      ENDOSCOPY, COLON, DIAGNOSTIC      TUBAL LIGATION       Current Medications:     sodium chloride  500 mL Intravenous Once    insulin lispro  0-6 Units Subcutaneous TID WC    insulin lispro  0-3 Units Subcutaneous Nightly    enoxaparin  30 mg Subcutaneous Daily    ipratropium-albuterol  1 ampule Inhalation Q4H WA    oseltamivir  30 mg Oral Daily    amiodarone  200 mg Oral Daily    atorvastatin  40 mg Oral Daily    carvedilol  12.5 mg Oral BID WC    famotidine  20 mg Oral Daily    ferrous gluconate  324 mg Oral BID WC    insulin glargine  50 Units Subcutaneous Nightly    sodium chloride flush  10 mL Intravenous 2 times per day    nicotine  1 patch Transdermal Daily    gabapentin  400 mg Oral TID     Allergies: Allergies   Allergen Reactions    Dicumarol      Other    Warfarin And Related Hives     Other       Social History:    TOBACCO:   reports that she quit smoking about 2 years ago.  Her smoking use included cigarettes. She has never used smokeless tobacco.  ETOH:   reports that she drinks alcohol. Family History:       Problem Relation Age of Onset    Diabetes Mother     Hypertension Mother     No Known Problems Father     No Known Problems Sister     No Known Problems Brother     No Known Problems Maternal Grandmother     No Known Problems Maternal Grandfather     No Known Problems Paternal Grandmother     No Known Problems Paternal Grandfather     No Known Problems Other        REVIEW OF SYSTEMS:    CONSTITUTIONAL:  Positive for fevers, chills, diaphoresis, activity change, appetite change, fatigue. Negative for night sweats and unexpected weight change. EYES:  negative for blurred vision, eye discharge, visual disturbance and icterus  HEENT:  negative for hearing loss, tinnitus, ear drainage, sinus pressure, nasal congestion, epistaxis and snoring  RESPIRATORY:  See HPI  CARDIOVASCULAR:  negative for chest pain, palpitations, exertional chest pressure/discomfort, edema, syncope  GASTROINTESTINAL:  negative for nausea, vomiting, diarrhea, constipation, blood in stool and abdominal pain  GENITOURINARY:  negative for frequency, dysuria, urinary incontinence, decreased urine volume, and hematuria  HEMATOLOGIC/LYMPHATIC:  negative for easy bruising, bleeding and lymphadenopathy  ALLERGIC/IMMUNOLOGIC:  negative for recurrent infections, angioedema, anaphylaxis and drug reactions  ENDOCRINE:  negative for weight changes and diabetic symptoms including polyuria, polydipsia and polyphagia  MUSCULOSKELETAL:  negative for  pain, joint swelling, decreased range of motion and muscle weakness  NEUROLOGICAL:  negative for headaches, slurred speech, unilateral weakness  PSYCHIATRIC/BEHAVIORAL: negative for hallucinations, behavioral problems, confusion and agitation.      Objective:   PHYSICAL EXAM:      VITALS:  /83   Pulse 74   Temp 99.2 °F (37.3 °C)   Resp 20   Ht 5' 5\" (1.651 m) Wt 253 lb 4.9 oz (114.9 kg)   SpO2 94%   BMI 42.15 kg/m²   24HR INTAKE/OUTPUT:      Intake/Output Summary (Last 24 hours) at 2019 1630  Last data filed at 2019 1534  Gross per 24 hour   Intake 2160 ml   Output 0 ml   Net 2160 ml     CURRENT PULSE OXIMETRY:  SpO2: 94 %  24HR PULSE OXIMETRY RANGE:  SpO2  Av.3 %  Min: 86 %  Max: 95 % on 2L    CONSTITUTIONAL:  awake, alert, cooperative, mild distress, and appears stated age  NECK:  Supple, symmetrical, trachea midline, no adenopathy, thyroid symmetric, not enlarged and no tenderness, skin normal  LUNGS:  mild increased work of breathing with diffuse expiratory wheezing and productive cough. mild accessory muscle use  CARDIOVASCULAR:  normal S1 and S2, no edema and no JVD  ABDOMEN:  normal bowel sounds, non-distended and no masses palpated, and no tenderness to palpation. No hepatospleenomegaly  LYMPHADENOPATHY:  no axillary or supraclavicular adenopathy. No cervical adnenopathy  PSYCHIATRIC: Oriented to person place and time. No obvious depression or anxiety. MUSCULOSKELETAL: No obvious misalignment or effusion of the joints. No clubbing, cyanosis of the digits. SKIN:  normal skin color, texture, turgor and no redness, warmth, or swelling.  No palpable nodules    DATA:    Old records have been reviewed  CBC with Differential:    Lab Results   Component Value Date    WBC 5.6 2019    RBC 3.37 2019    HGB 9.3 2019    HCT 29.2 2019     2019    MCV 86.5 2019    MCH 27.5 2019    MCHC 31.8 2019    RDW 19.0 2019    METASPCT 1 2017    LYMPHOPCT 15.4 2019    MONOPCT 13.5 2019    MYELOPCT 1 2016    BASOPCT 0.3 2019    MONOSABS 0.8 2019    LYMPHSABS 0.9 2019    EOSABS 0.0 2019    BASOSABS 0.0 2019     BMP:    Lab Results   Component Value Date     2019    K 4.7 2019     2019    CO2 21 2019    BUN 35 2019 CREATININE 3.2 04/05/2019    CALCIUM 8.8 04/05/2019    GFRAA 18 04/05/2019    LABGLOM 15 04/05/2019    GLUCOSE 137 04/05/2019     Hepatic Function Panel:    Lab Results   Component Value Date    ALKPHOS 65 04/04/2019    ALT 16 04/04/2019    AST 24 04/04/2019    PROT 7.7 04/04/2019    BILITOT <0.2 04/04/2019    BILIDIR <0.2 04/04/2019    IBILI see below 04/04/2019     ABG:  No results found for: KRI3FWM, BEART, G1NLNFGL, PHART, THGBART, FID6SDR, PO2ART, UXT6JHF    Cultures:   Blood Culture:    Sputum Culture:        Radiology Review:  All pertinent images / reports were reviewed as a part of this visit. imaging reveals the following:  XR CHEST STANDARD (2 VW)   Final Result   1. Interstitial edema, congestive heart failure. Differentials includes interstitial pneumonia. Other diagnostic test:      PFTs: none    Assessment/Plan   59 y.o. female with influenza pneumonia and COPD exacerbation    Bilateral infiltrates on her chest xray could all be consistent with flu and the timing of her worsening is on the border for when one would suspect a bacterial super infection. I agree with checking a procal again so I ordered it. I also wrote for an intermediate dose of prednisone to improve her bronchospasm but hopefully not exacerbate her sugars as badly. IF she worsens I would cover her for bacterial pneumonia with an emphasis towards MRSA but I don't think it's needed at this time.     Will follow     Sabi Zheng

## 2019-04-07 PROBLEM — N18.9 ACUTE RENAL FAILURE SUPERIMPOSED ON CHRONIC KIDNEY DISEASE (HCC): Status: ACTIVE | Noted: 2019-04-07

## 2019-04-07 PROBLEM — N17.9 ACUTE RENAL FAILURE SUPERIMPOSED ON CHRONIC KIDNEY DISEASE (HCC): Status: ACTIVE | Noted: 2019-04-07

## 2019-04-07 PROBLEM — J84.9 PNEUMONIA, INTERSTITIAL (HCC): Status: ACTIVE | Noted: 2019-04-07

## 2019-04-07 PROBLEM — E11.65 UNCONTROLLED TYPE 2 DIABETES MELLITUS WITH HYPERGLYCEMIA (HCC): Status: ACTIVE | Noted: 2019-04-07

## 2019-04-07 LAB
ALBUMIN SERPL-MCNC: 3.8 G/DL (ref 3.4–5)
ANION GAP SERPL CALCULATED.3IONS-SCNC: 17 MMOL/L (ref 3–16)
BUN BLDV-MCNC: 46 MG/DL (ref 7–20)
CALCIUM SERPL-MCNC: 8.3 MG/DL (ref 8.3–10.6)
CHLORIDE BLD-SCNC: 104 MMOL/L (ref 99–110)
CO2: 18 MMOL/L (ref 21–32)
CREAT SERPL-MCNC: 2.6 MG/DL (ref 0.6–1.2)
GFR AFRICAN AMERICAN: 23
GFR NON-AFRICAN AMERICAN: 19
GLUCOSE BLD-MCNC: 220 MG/DL (ref 70–99)
GLUCOSE BLD-MCNC: 253 MG/DL (ref 70–99)
GLUCOSE BLD-MCNC: 255 MG/DL (ref 70–99)
GLUCOSE BLD-MCNC: 291 MG/DL (ref 70–99)
GLUCOSE BLD-MCNC: 388 MG/DL (ref 70–99)
PERFORMED ON: ABNORMAL
PHOSPHORUS: 4.5 MG/DL (ref 2.5–4.9)
POTASSIUM SERPL-SCNC: 5.3 MMOL/L (ref 3.5–5.1)
PROCALCITONIN: 0.11 NG/ML (ref 0–0.15)
SODIUM BLD-SCNC: 139 MMOL/L (ref 136–145)

## 2019-04-07 PROCEDURE — 6370000000 HC RX 637 (ALT 250 FOR IP): Performed by: INTERNAL MEDICINE

## 2019-04-07 PROCEDURE — 83540 ASSAY OF IRON: CPT

## 2019-04-07 PROCEDURE — 83550 IRON BINDING TEST: CPT

## 2019-04-07 PROCEDURE — 99232 SBSQ HOSP IP/OBS MODERATE 35: CPT | Performed by: INTERNAL MEDICINE

## 2019-04-07 PROCEDURE — 94640 AIRWAY INHALATION TREATMENT: CPT

## 2019-04-07 PROCEDURE — 80069 RENAL FUNCTION PANEL: CPT

## 2019-04-07 PROCEDURE — 84145 PROCALCITONIN (PCT): CPT

## 2019-04-07 PROCEDURE — 1200000000 HC SEMI PRIVATE

## 2019-04-07 PROCEDURE — 99233 SBSQ HOSP IP/OBS HIGH 50: CPT | Performed by: INTERNAL MEDICINE

## 2019-04-07 PROCEDURE — 94668 MNPJ CHEST WALL SBSQ: CPT

## 2019-04-07 PROCEDURE — 94761 N-INVAS EAR/PLS OXIMETRY MLT: CPT

## 2019-04-07 PROCEDURE — 36415 COLL VENOUS BLD VENIPUNCTURE: CPT

## 2019-04-07 PROCEDURE — 2700000000 HC OXYGEN THERAPY PER DAY

## 2019-04-07 RX ADMIN — INSULIN LISPRO 5 UNITS: 100 INJECTION, SOLUTION INTRAVENOUS; SUBCUTANEOUS at 18:24

## 2019-04-07 RX ADMIN — GABAPENTIN 400 MG: 400 CAPSULE ORAL at 20:55

## 2019-04-07 RX ADMIN — IPRATROPIUM BROMIDE AND ALBUTEROL SULFATE 1 AMPULE: .5; 3 SOLUTION RESPIRATORY (INHALATION) at 12:15

## 2019-04-07 RX ADMIN — AMIODARONE HYDROCHLORIDE 200 MG: 200 TABLET ORAL at 09:55

## 2019-04-07 RX ADMIN — FERROUS GLUCONATE TAB 324 MG (37.5 MG ELEMENTAL IRON) 324 MG: 324 (37.5 FE) TAB at 09:55

## 2019-04-07 RX ADMIN — IPRATROPIUM BROMIDE AND ALBUTEROL SULFATE 1 AMPULE: .5; 3 SOLUTION RESPIRATORY (INHALATION) at 07:55

## 2019-04-07 RX ADMIN — CARVEDILOL 12.5 MG: 12.5 TABLET, FILM COATED ORAL at 18:23

## 2019-04-07 RX ADMIN — GUAIFENESIN AND DEXTROMETHORPHAN 5 ML: 100; 10 SYRUP ORAL at 02:58

## 2019-04-07 RX ADMIN — INSULIN LISPRO 5 UNITS: 100 INJECTION, SOLUTION INTRAVENOUS; SUBCUTANEOUS at 13:18

## 2019-04-07 RX ADMIN — CARVEDILOL 12.5 MG: 12.5 TABLET, FILM COATED ORAL at 09:55

## 2019-04-07 RX ADMIN — INSULIN LISPRO 3 UNITS: 100 INJECTION, SOLUTION INTRAVENOUS; SUBCUTANEOUS at 13:19

## 2019-04-07 RX ADMIN — HYDROCODONE BITARTRATE AND ACETAMINOPHEN 1 TABLET: 7.5; 325 TABLET ORAL at 20:55

## 2019-04-07 RX ADMIN — PREDNISONE 20 MG: 20 TABLET ORAL at 09:55

## 2019-04-07 RX ADMIN — HYDROCODONE BITARTRATE AND ACETAMINOPHEN 1 TABLET: 7.5; 325 TABLET ORAL at 02:57

## 2019-04-07 RX ADMIN — IPRATROPIUM BROMIDE AND ALBUTEROL SULFATE 1 AMPULE: .5; 3 SOLUTION RESPIRATORY (INHALATION) at 16:15

## 2019-04-07 RX ADMIN — IPRATROPIUM BROMIDE AND ALBUTEROL SULFATE 1 AMPULE: .5; 3 SOLUTION RESPIRATORY (INHALATION) at 20:39

## 2019-04-07 RX ADMIN — ATORVASTATIN CALCIUM 40 MG: 40 TABLET, FILM COATED ORAL at 09:54

## 2019-04-07 RX ADMIN — HYDROCODONE BITARTRATE AND ACETAMINOPHEN 1 TABLET: 7.5; 325 TABLET ORAL at 13:24

## 2019-04-07 RX ADMIN — INSULIN LISPRO 3 UNITS: 100 INJECTION, SOLUTION INTRAVENOUS; SUBCUTANEOUS at 18:28

## 2019-04-07 RX ADMIN — INSULIN LISPRO 2 UNITS: 100 INJECTION, SOLUTION INTRAVENOUS; SUBCUTANEOUS at 09:56

## 2019-04-07 RX ADMIN — GUAIFENESIN AND DEXTROMETHORPHAN 5 ML: 100; 10 SYRUP ORAL at 18:23

## 2019-04-07 RX ADMIN — GABAPENTIN 400 MG: 400 CAPSULE ORAL at 09:54

## 2019-04-07 RX ADMIN — INSULIN GLARGINE 60 UNITS: 100 INJECTION, SOLUTION SUBCUTANEOUS at 22:18

## 2019-04-07 RX ADMIN — GABAPENTIN 400 MG: 400 CAPSULE ORAL at 14:57

## 2019-04-07 RX ADMIN — FAMOTIDINE 20 MG: 20 TABLET ORAL at 09:55

## 2019-04-07 RX ADMIN — GUAIFENESIN AND DEXTROMETHORPHAN 5 ML: 100; 10 SYRUP ORAL at 22:19

## 2019-04-07 RX ADMIN — OSELTAMIVIR PHOSPHATE 30 MG: 30 CAPSULE ORAL at 10:02

## 2019-04-07 RX ADMIN — FERROUS GLUCONATE TAB 324 MG (37.5 MG ELEMENTAL IRON) 324 MG: 324 (37.5 FE) TAB at 18:23

## 2019-04-07 RX ADMIN — INSULIN LISPRO 3 UNITS: 100 INJECTION, SOLUTION INTRAVENOUS; SUBCUTANEOUS at 22:19

## 2019-04-07 ASSESSMENT — PAIN SCALES - GENERAL
PAINLEVEL_OUTOF10: 10
PAINLEVEL_OUTOF10: 5
PAINLEVEL_OUTOF10: 10
PAINLEVEL_OUTOF10: 5

## 2019-04-07 ASSESSMENT — PAIN DESCRIPTION - LOCATION: LOCATION: GENERALIZED

## 2019-04-07 NOTE — PROGRESS NOTES
Pulmonary Followup Note    CC: influenza pneumonia  Subjective:  Afebrile overnight. Still short of breath with wheezing. Says she's feeling a bit better however. ROS:  Denies headache, nausea or chest pain. 24HR INTAKE/OUTPUT:      Intake/Output Summary (Last 24 hours) at 2019 1537  Last data filed at 2019 1210  Gross per 24 hour   Intake 1092 ml   Output 700 ml   Net 392 ml        insulin lispro  5 Units Subcutaneous TID WC    insulin glargine  60 Units Subcutaneous Nightly    predniSONE  20 mg Oral Daily    insulin lispro  0-6 Units Subcutaneous TID WC    insulin lispro  0-3 Units Subcutaneous Nightly    enoxaparin  30 mg Subcutaneous Daily    ipratropium-albuterol  1 ampule Inhalation Q4H WA    oseltamivir  30 mg Oral Daily    amiodarone  200 mg Oral Daily    atorvastatin  40 mg Oral Daily    carvedilol  12.5 mg Oral BID WC    famotidine  20 mg Oral Daily    ferrous gluconate  324 mg Oral BID     sodium chloride flush  10 mL Intravenous 2 times per day    nicotine  1 patch Transdermal Daily    gabapentin  400 mg Oral TID           PHYSICAL EXAMINATION:  BP (!) 145/76   Pulse 66   Temp 98.1 °F (36.7 °C) (Oral)   Resp 18   Ht 5' 5\" (1.651 m)   Wt 253 lb 4.9 oz (114.9 kg)   SpO2 95%   BMI 42.15 kg/m²   CURRENT PULSE OXIMETRY:  SpO2: 95 %  24HR PULSE OXIMETRY RANGE:  SpO2  Av.2 %  Min: 93 %  Max: 97 % on ra      Gen: no distress. Speaking in full sentences without accessory muscle use  HEENT: PERRL, EOMI, OP nl  Lung:Coarse upper airway wheezing with inspiratory ronchi  CV: RRR without M/R/R  Abd: +BS, soft, NT/ND  Ext: No edema.     DATA  CBC:   Recent Labs     19  045   WBC 5.6   HGB 9.3*   HCT 29.2*   MCV 86.5        BMP:   Recent Labs     197 19  0508    139   K 4.7 5.3*    104   CO2 21 18*   PHOS  --  4.5   BUN 35* 46*   CREATININE 3.2* 2.6*     No results for input(s): PHART, SAH7KHM, PO2ART in the last 72 hours. LIVER PROFILE: No results for input(s): AST, ALT, LIPASE, BILIDIR, BILITOT, ALKPHOS in the last 72 hours. Invalid input(s): AMYLASE,  ALB    CXR REVIEWED BY ME AND SHOWED:  XR CHEST STANDARD (2 VW)   Final Result   1. Interstitial edema, congestive heart failure. Differentials includes interstitial pneumonia. ASSESSMENT/PLAN:  This is a 59 y.o. female with influenza pneumonia    Started on steroids yesterday with some clinical improvement but worsened glucose control. Procalcitonin came back normal which supports the viewpoint that she doesn't have a bacterial super infection. Unless something changes, her viral infection will need to run it's course. Continue supportive therapy with steroids and bronchodilators.         Ramona Crowder MD

## 2019-04-07 NOTE — PLAN OF CARE
Pt is a Fall Risk. See Rodamber Hoot Fall Risk Score. Pt bed in low position and side rails up. Call light and belongings in reach. Pt encouraged to call for assistance. Will continue with hourly rounds for PO intake, pain needs, toileting, and repositioning as needed.

## 2019-04-07 NOTE — PROGRESS NOTES
Hospital Medicine Care  Progress Note      Chief complain; Cough (x 1 week ); Fever (x 1 week ); and Emesis           Subjective:     Sob better  C/o cough associated with productive phlegm  C/o loose stools  No nausea ,emesis  Afebrile  Tmax 99.2     Review of Systems:     Review of Systems as mentioned above, other ros is negative. Objective:       Medications        Scheduled Meds:   insulin lispro  5 Units Subcutaneous TID WC    predniSONE  20 mg Oral Daily    insulin lispro  0-6 Units Subcutaneous TID     insulin lispro  0-3 Units Subcutaneous Nightly    enoxaparin  30 mg Subcutaneous Daily    ipratropium-albuterol  1 ampule Inhalation Q4H WA    oseltamivir  30 mg Oral Daily    amiodarone  200 mg Oral Daily    atorvastatin  40 mg Oral Daily    carvedilol  12.5 mg Oral BID WC    famotidine  20 mg Oral Daily    ferrous gluconate  324 mg Oral BID     insulin glargine  50 Units Subcutaneous Nightly    sodium chloride flush  10 mL Intravenous 2 times per day    nicotine  1 patch Transdermal Daily    gabapentin  400 mg Oral TID     Continuous Infusions:   dextrose       PRN Meds:.glucose, dextrose, glucagon (rDNA), dextrose, HYDROcodone-acetaminophen, sodium chloride flush, magnesium hydroxide, ondansetron, guaiFENesin-dextromethorphan      Allergies  Allergies   Allergen Reactions    Dicumarol      Other    Warfarin And Related Hives     Other           Vitals:    04/07/19 0754 04/07/19 0755 04/07/19 1210 04/07/19 1215   BP: (!) 150/70  (!) 145/76    Pulse: 68  66    Resp: 18      Temp: 97.5 °F (36.4 °C)  98.1 °F (36.7 °C)    TempSrc: Oral  Oral    SpO2: 95% 94% 96% 95%   Weight:       Height:             Physical exam:         Physical Exam   Constitutional: She appears well-developed and well-nourished. No distress. HENT:   Head: Normocephalic and atraumatic. Cardiovascular: Normal rate and regular rhythm. Exam reveals no friction rub. No murmur heard.   Pulmonary/Chest: Effort normal. No stridor. No respiratory distress. She has rhonchi. Abdominal: Soft. Bowel sounds are normal. She exhibits no distension. There is no tenderness. Musculoskeletal: Normal range of motion. She exhibits no edema. Neurological: She is alert. No cranial nerve deficit. Coordination normal.   Skin: Skin is warm and dry. Capillary refill takes less than 2 seconds. She is not diaphoretic. Psychiatric: She has a normal mood and affect. Labs      Recent Labs     04/05/19  0457   WBC 5.6   HGB 9.3*   HCT 29.2*      MCV 86.5        Recent Labs     04/05/19  0457 04/07/19  0508    139   K 4.7 5.3*    104   CO2 21 18*   PHOS  --  4.5   BUN 35* 46*   CREATININE 3.2* 2.6*       No results for input(s): AST, ALT, ALB, BILIDIR, BILITOT, ALKPHOS in the last 72 hours. No results for input(s): MG in the last 72 hours. Radiology  XR CHEST STANDARD (2 VW)   Final Result   1. Interstitial edema, congestive heart failure. Differentials includes interstitial pneumonia. Assessment and Plan:   Principal Problem:    Influenza A  Active Problems:    Pneumonia, interstitial ([address]    Acute renal failure superimposed on chronic kidney disease ([address]    Uncontrolled type 2 diabetes mellitus with hyperglycemia ([address]  Resolved Problems:    * No resolved hospital problems. *         #1 influenza A with interstitial pneumonia  Continue with the Tamiflu. Procal wnl. Hold on abx     #2 acute hypoxic respiratory failure  Secondary to influenza with interstitial pneumonia. Not much response with the diuretics. Suspected secondary to pneumonia. Pulmonology is consulted. Wean oxygen to keep spo2>90    #3 Sepsis POA: 2/2 PNA and influenza. Improving. #4 Obesity (Body mass index is 42.15 kg/m². ) - Complicating assessment and treatment. Placing patient at risk for multiple co-morbidities as well as early death and contributing to the patient's presentation. Counseled on weight loss. #5 DM type 2 uncontrolled: 2/2 prednisone. At home on 68 units of lantus. Increase lantus  And add humalog with meals.          Leonie Nettles MD  4/7/2019

## 2019-04-07 NOTE — PROGRESS NOTES
[address] Daily Progress Note      Admit Date:  4/4/2019    Subjective:  Ms. Josi Martinez was seen and examined. F/U Flu/Pneuonitis/CHF. Some better. Still with cough but some better. No chest pain. Objective:   BP (!) 152/69   Pulse 67   Temp 97.8 °F (36.6 °C) (Oral)   Resp 18   Ht 5' 5\" (1.651 m)   Wt 253 lb 4.9 oz (114.9 kg)   SpO2 96%   BMI 42.15 kg/m²       Intake/Output Summary (Last 24 hours) at 4/7/2019 0715  Last data filed at 4/7/2019 0301  Gross per 24 hour   Intake 2042 ml   Output 700 ml   Net 1342 ml       TELEMETRY: Sinus     Physical Exam:  General:  Awake, alert, NAD  Skin:  Warm and dry  Neck:  JVP difficult   Chest:  Cough.   Diffuse wheeze  Cardiovascular:  RRR S1S2  Abdomen:  Soft nontender  Extremities:  no edema    Medications:    predniSONE  20 mg Oral Daily    insulin lispro  0-6 Units Subcutaneous TID WC    insulin lispro  0-3 Units Subcutaneous Nightly    enoxaparin  30 mg Subcutaneous Daily    ipratropium-albuterol  1 ampule Inhalation Q4H WA    oseltamivir  30 mg Oral Daily    amiodarone  200 mg Oral Daily    atorvastatin  40 mg Oral Daily    carvedilol  12.5 mg Oral BID WC    famotidine  20 mg Oral Daily    ferrous gluconate  324 mg Oral BID WC    insulin glargine  50 Units Subcutaneous Nightly    sodium chloride flush  10 mL Intravenous 2 times per day    nicotine  1 patch Transdermal Daily    gabapentin  400 mg Oral TID      dextrose       glucose, dextrose, glucagon (rDNA), dextrose, HYDROcodone-acetaminophen, sodium chloride flush, magnesium hydroxide, ondansetron, guaiFENesin-dextromethorphan    Lab Data:  CBC:   Recent Labs     04/04/19  1051 04/05/19  0457   WBC 9.0 5.6   HGB 9.0* 9.3*   HCT 28.4* 29.2*   MCV 86.1 86.5    192     BMP:   Recent Labs     04/04/19  1051 04/05/19  0457 04/07/19  0508    144 139   K 4.5 4.7 5.3*    106 104   CO2 19* 21 18*   PHOS  --   --  4.5   BUN 26* 35* 46*   CREATININE 2.3* 3.2* 2.6*     LIVER PROFILE:   Recent Labs     04/04/19  1051   AST 24   ALT 16   LIPASE 23.0   BILIDIR <0.2   BILITOT <0.2   ALKPHOS 65     PT/INR: No results for input(s): PROTIME, INR in the last 72 hours. APTT: No results for input(s): APTT in the last 72 hours. BNP:  No results for input(s): BNP in the last 72 hours. IMAGING:     Assessment:  Patient Active Problem List    Diagnosis Date Noted    Anemia 05/24/2017     Priority: Low    On amiodarone therapy 09/11/2016     Priority: Low    Hyperlipidemia 09/11/2016     Priority: Low    Colitis presumed infectious      Priority: Low    Persistent atrial fibrillation ([facility] 06/01/2016     Priority: Low    Sepsis ([facility] 06/01/2016     Priority: Low    Acute renal failure (HCC)      Priority: Low    Non-intractable vomiting with nausea      Priority: Low    Lactic acidemia      Priority: Low    Leukocytosis      Priority: Low    DKA (diabetic ketoacidoses) ([facility]      Priority: Low    Influenza A 04/05/2019    CHF (congestive heart failure), NYHA class I, acute on chronic, combined [facility] 04/04/2019    Leg swelling 03/12/2019    Venous insufficiency 03/12/2019    Chronic venous htn w inflammation of bilateral low extrm 03/12/2019       Plan:  1. Feeling better. Breathing/wheezing better. On prednisone now per pulmonary. No chest pain. T max 99.5. Cr better. Continue tamiflu.         Core Measures:  · Discharge instructions:   · LVEF documented:   · ACEI for LV dysfunction:   · Smoking Cessation:    Carrie Vergara MD 4/7/2019 7:15 AM

## 2019-04-08 LAB
ALBUMIN SERPL-MCNC: 4 G/DL (ref 3.4–5)
ANION GAP SERPL CALCULATED.3IONS-SCNC: 15 MMOL/L (ref 3–16)
BUN BLDV-MCNC: 45 MG/DL (ref 7–20)
CALCIUM SERPL-MCNC: 8.7 MG/DL (ref 8.3–10.6)
CHLORIDE BLD-SCNC: 107 MMOL/L (ref 99–110)
CO2: 22 MMOL/L (ref 21–32)
CREAT SERPL-MCNC: 2.1 MG/DL (ref 0.6–1.2)
GFR AFRICAN AMERICAN: 29
GFR NON-AFRICAN AMERICAN: 24
GLUCOSE BLD-MCNC: 156 MG/DL (ref 70–99)
GLUCOSE BLD-MCNC: 166 MG/DL (ref 70–99)
GLUCOSE BLD-MCNC: 217 MG/DL (ref 70–99)
GLUCOSE BLD-MCNC: 261 MG/DL (ref 70–99)
GLUCOSE BLD-MCNC: 273 MG/DL (ref 70–99)
IRON SATURATION: 15 % (ref 15–50)
IRON: 32 UG/DL (ref 37–145)
PERFORMED ON: ABNORMAL
PHOSPHORUS: 2.9 MG/DL (ref 2.5–4.9)
POTASSIUM SERPL-SCNC: 5.4 MMOL/L (ref 3.5–5.1)
SODIUM BLD-SCNC: 144 MMOL/L (ref 136–145)
TOTAL IRON BINDING CAPACITY: 208 UG/DL (ref 260–445)

## 2019-04-08 PROCEDURE — 94664 DEMO&/EVAL PT USE INHALER: CPT

## 2019-04-08 PROCEDURE — 6370000000 HC RX 637 (ALT 250 FOR IP): Performed by: INTERNAL MEDICINE

## 2019-04-08 PROCEDURE — 94640 AIRWAY INHALATION TREATMENT: CPT

## 2019-04-08 PROCEDURE — 94761 N-INVAS EAR/PLS OXIMETRY MLT: CPT

## 2019-04-08 PROCEDURE — 94668 MNPJ CHEST WALL SBSQ: CPT

## 2019-04-08 PROCEDURE — 99232 SBSQ HOSP IP/OBS MODERATE 35: CPT | Performed by: INTERNAL MEDICINE

## 2019-04-08 PROCEDURE — 1200000000 HC SEMI PRIVATE

## 2019-04-08 PROCEDURE — 80069 RENAL FUNCTION PANEL: CPT

## 2019-04-08 PROCEDURE — 36415 COLL VENOUS BLD VENIPUNCTURE: CPT

## 2019-04-08 RX ADMIN — INSULIN LISPRO 2 UNITS: 100 INJECTION, SOLUTION INTRAVENOUS; SUBCUTANEOUS at 18:09

## 2019-04-08 RX ADMIN — IPRATROPIUM BROMIDE AND ALBUTEROL SULFATE 1 AMPULE: .5; 3 SOLUTION RESPIRATORY (INHALATION) at 12:23

## 2019-04-08 RX ADMIN — FERROUS GLUCONATE TAB 324 MG (37.5 MG ELEMENTAL IRON) 324 MG: 324 (37.5 FE) TAB at 08:20

## 2019-04-08 RX ADMIN — FERROUS GLUCONATE TAB 324 MG (37.5 MG ELEMENTAL IRON) 324 MG: 324 (37.5 FE) TAB at 17:26

## 2019-04-08 RX ADMIN — HYDROCODONE BITARTRATE AND ACETAMINOPHEN 1 TABLET: 7.5; 325 TABLET ORAL at 10:08

## 2019-04-08 RX ADMIN — HYDROCODONE BITARTRATE AND ACETAMINOPHEN 1 TABLET: 7.5; 325 TABLET ORAL at 04:08

## 2019-04-08 RX ADMIN — OSELTAMIVIR PHOSPHATE 30 MG: 30 CAPSULE ORAL at 08:20

## 2019-04-08 RX ADMIN — ATORVASTATIN CALCIUM 40 MG: 40 TABLET, FILM COATED ORAL at 08:19

## 2019-04-08 RX ADMIN — GUAIFENESIN AND DEXTROMETHORPHAN 5 ML: 100; 10 SYRUP ORAL at 14:52

## 2019-04-08 RX ADMIN — HYDROCODONE BITARTRATE AND ACETAMINOPHEN 1 TABLET: 7.5; 325 TABLET ORAL at 17:03

## 2019-04-08 RX ADMIN — GABAPENTIN 400 MG: 400 CAPSULE ORAL at 13:49

## 2019-04-08 RX ADMIN — GUAIFENESIN AND DEXTROMETHORPHAN 5 ML: 100; 10 SYRUP ORAL at 04:08

## 2019-04-08 RX ADMIN — GABAPENTIN 400 MG: 400 CAPSULE ORAL at 08:20

## 2019-04-08 RX ADMIN — CARVEDILOL 12.5 MG: 12.5 TABLET, FILM COATED ORAL at 08:19

## 2019-04-08 RX ADMIN — GABAPENTIN 400 MG: 400 CAPSULE ORAL at 20:28

## 2019-04-08 RX ADMIN — IPRATROPIUM BROMIDE AND ALBUTEROL SULFATE 1 AMPULE: .5; 3 SOLUTION RESPIRATORY (INHALATION) at 21:35

## 2019-04-08 RX ADMIN — CARVEDILOL 12.5 MG: 12.5 TABLET, FILM COATED ORAL at 17:26

## 2019-04-08 RX ADMIN — IPRATROPIUM BROMIDE AND ALBUTEROL SULFATE 1 AMPULE: .5; 3 SOLUTION RESPIRATORY (INHALATION) at 08:39

## 2019-04-08 RX ADMIN — PREDNISONE 20 MG: 20 TABLET ORAL at 08:19

## 2019-04-08 RX ADMIN — INSULIN LISPRO 5 UNITS: 100 INJECTION, SOLUTION INTRAVENOUS; SUBCUTANEOUS at 08:24

## 2019-04-08 RX ADMIN — AMIODARONE HYDROCHLORIDE 200 MG: 200 TABLET ORAL at 08:19

## 2019-04-08 RX ADMIN — INSULIN LISPRO 3 UNITS: 100 INJECTION, SOLUTION INTRAVENOUS; SUBCUTANEOUS at 08:23

## 2019-04-08 RX ADMIN — INSULIN LISPRO 1 UNITS: 100 INJECTION, SOLUTION INTRAVENOUS; SUBCUTANEOUS at 12:51

## 2019-04-08 RX ADMIN — INSULIN LISPRO 2 UNITS: 100 INJECTION, SOLUTION INTRAVENOUS; SUBCUTANEOUS at 20:28

## 2019-04-08 RX ADMIN — FAMOTIDINE 20 MG: 20 TABLET ORAL at 08:19

## 2019-04-08 ASSESSMENT — PAIN DESCRIPTION - FREQUENCY
FREQUENCY: CONTINUOUS
FREQUENCY: CONTINUOUS

## 2019-04-08 ASSESSMENT — PAIN DESCRIPTION - PAIN TYPE
TYPE: CHRONIC PAIN

## 2019-04-08 ASSESSMENT — PAIN DESCRIPTION - DESCRIPTORS
DESCRIPTORS: ACHING;CONSTANT
DESCRIPTORS: CONSTANT;DISCOMFORT

## 2019-04-08 ASSESSMENT — PAIN SCALES - GENERAL
PAINLEVEL_OUTOF10: 10
PAINLEVEL_OUTOF10: 10
PAINLEVEL_OUTOF10: 3
PAINLEVEL_OUTOF10: 7
PAINLEVEL_OUTOF10: 2
PAINLEVEL_OUTOF10: 10
PAINLEVEL_OUTOF10: 2

## 2019-04-08 ASSESSMENT — PAIN DESCRIPTION - LOCATION
LOCATION: GENERALIZED

## 2019-04-08 ASSESSMENT — PAIN DESCRIPTION - PROGRESSION
CLINICAL_PROGRESSION: NOT CHANGED
CLINICAL_PROGRESSION: NOT CHANGED

## 2019-04-08 ASSESSMENT — PAIN DESCRIPTION - ONSET
ONSET: ON-GOING
ONSET: ON-GOING

## 2019-04-08 ASSESSMENT — PAIN - FUNCTIONAL ASSESSMENT: PAIN_FUNCTIONAL_ASSESSMENT: PREVENTS OR INTERFERES SOME ACTIVE ACTIVITIES AND ADLS

## 2019-04-08 ASSESSMENT — PAIN DESCRIPTION - ORIENTATION: ORIENTATION: RIGHT;LEFT

## 2019-04-08 NOTE — PROGRESS NOTES
Renal Progress Note  Subjective:   Admit Date: 4/4/2019     HPI Sandra Nunez is a 59 y.o. female with hx of CKD3 (baseline creatinine ~2.0), afib on amiodarone off AC 2/2 GI bleed, HTN, CHF w/preserved EF, and HLD who presented today with worsening shortness of breath and fever for 2 days. Interval History:     Good UO   Cr better   K elevated       DIET DIET GENERAL; Low Potassium  Medications:   Scheduled Meds:   insulin lispro  10 Units Subcutaneous TID WC    insulin glargine  70 Units Subcutaneous Nightly    predniSONE  20 mg Oral Daily    insulin lispro  0-6 Units Subcutaneous TID WC    insulin lispro  0-3 Units Subcutaneous Nightly    enoxaparin  30 mg Subcutaneous Daily    ipratropium-albuterol  1 ampule Inhalation Q4H WA    oseltamivir  30 mg Oral Daily    amiodarone  200 mg Oral Daily    atorvastatin  40 mg Oral Daily    carvedilol  12.5 mg Oral BID WC    famotidine  20 mg Oral Daily    ferrous gluconate  324 mg Oral BID WC    sodium chloride flush  10 mL Intravenous 2 times per day    nicotine  1 patch Transdermal Daily    gabapentin  400 mg Oral TID     Continuous Infusions:   dextrose         Labs:  CBC:   No results for input(s): WBC, HGB, PLT in the last 72 hours. BMP:    Recent Labs     04/07/19  0508 04/08/19  1342    144   K 5.3* 5.4*    107   CO2 18* 22   BUN 46* 45*   CREATININE 2.6* 2.1*   GLUCOSE 255* 156*     Ca/Mg/Phos:   Recent Labs     04/07/19  0508 04/08/19  1342   CALCIUM 8.3 8.7   PHOS 4.5 2.9     Hepatic:   No results for input(s): AST, ALT, ALB, BILITOT, ALKPHOS in the last 72 hours. Troponin:   No results for input(s): TROPONINI in the last 72 hours. BNP: No results for input(s): BNP in the last 72 hours. Lipids: No results for input(s): CHOL, TRIG, HDL, LDLCALC, LABVLDL in the last 72 hours. ABGs: No results for input(s): PHART, PO2ART, WIC4RTT in the last 72 hours. INR: No results for input(s): INR in the last 72 hours.   UA:No results for input(s): Suhail Overlie, GLUCOSEU, BILIRUBINUR, Matthieu Deep, BLOODU, PHUR, PROTEINU, UROBILINOGEN, NITRU, LEUKOCYTESUR, Roslynn Cayden in the last 72 hours. Urine Microscopic: No results for input(s): LABCAST, BACTERIA, COMU, HYALCAST, WBCUA, RBCUA, EPIU in the last 72 hours. Urine Culture: No results for input(s): LABURIN in the last 72 hours. Urine Chemistry: No results for input(s): Laurina Tan, PROTEINUR, NAUR in the last 72 hours. Objective:   Vitals: BP (!) 156/64   Pulse 71   Temp 97.5 °F (36.4 °C) (Oral)   Resp 18   Ht 5' 5\" (1.651 m)   Wt 256 lb 6.3 oz (116.3 kg)   SpO2 93%   BMI 42.67 kg/m²    Wt Readings from Last 3 Encounters:   04/08/19 256 lb 6.3 oz (116.3 kg)   03/12/19 272 lb (123.4 kg)   01/29/19 280 lb (127 kg)      24HR INTAKE/OUTPUT:      Intake/Output Summary (Last 24 hours) at 4/8/2019 1449  Last data filed at 4/8/2019 1300  Gross per 24 hour   Intake 1320 ml   Output 1500 ml   Net -180 ml     Constitutional:  Alert, awake, no apparent distress  NECK: supple, no JVD  Cardiovascular:  S1, S2 without m/r/g  Respiratory:  Course air movement bilaterally  Abdomen: +bs, soft, nt  Ext: no LE edema    Assessment and Plan:       IMAGING:  XR CHEST STANDARD (2 VW)   Final Result   1. Interstitial edema, congestive heart failure. Differentials includes interstitial pneumonia.           Assessment/Plan     LAURIE on CKD 3 - sec to hypovolemia   - Cr better   - off IVF   - renal panel in am   - BP better     HTN  - controlled     Anemia  -iron studies      Acid-Base/Electrolytes  -Has hx of hyperkalemia, continue to follow   - dec K in diet   - Labs in AM    Bryn Billingsley MD

## 2019-04-08 NOTE — PLAN OF CARE
Problem: Pain:  Goal: Pain level will decrease  Description  Pain level will decrease  Outcome: Ongoing     Problem: Pain:  Goal: Control of acute pain  Description  Control of acute pain  Outcome: Ongoing       Norco prn effective for pt's chronic pain. Will cont to monitor.

## 2019-04-08 NOTE — PLAN OF CARE
Problem: Falls - Risk of:  Goal: Will remain free from falls  Description  Will remain free from falls  Outcome: Ongoing  Note:   Patient remains free of falls thus far this shift. Patient alert and oriented x4. UAL and noted with a steady gait. Denies complaints of dizziness/lightheadedness. Non slip socks on while out of bed. Currently resting in bed quietly. Will continue to monitor. Problem: Pain:  Goal: Control of chronic pain  Description  Control of chronic pain  Outcome: Ongoing  Note:   Patient with on-going complaints of chronic generalized pain. Medicated with PRN Norco per order. Will continue to monitor.

## 2019-04-08 NOTE — PROGRESS NOTES
Resident Progress Note    Admit Date: 2019    PCP: Mc Berkowitz MD                  : 1960  MRN: [id number]    CC: Cough, fever, emesis    Subjective:   Better today, no fever, abdomen aches when she coughs, clear sputum, soft stool, no diarrhea. ROS:  No fever / chills / sweats. + cough / sputum, sob. Denies chest pain, palpitations. Denies n / v / abd pain. No diarrhea. Denies dysuria or change in urinary function. Denies focal weakness, sensory change or other neurologic symptom      Data:   Scheduled Meds:   insulin lispro  5 Units Subcutaneous TID WC    insulin glargine  60 Units Subcutaneous Nightly    predniSONE  20 mg Oral Daily    insulin lispro  0-6 Units Subcutaneous TID WC    insulin lispro  0-3 Units Subcutaneous Nightly    enoxaparin  30 mg Subcutaneous Daily    ipratropium-albuterol  1 ampule Inhalation Q4H WA    oseltamivir  30 mg Oral Daily    amiodarone  200 mg Oral Daily    atorvastatin  40 mg Oral Daily    carvedilol  12.5 mg Oral BID WC    famotidine  20 mg Oral Daily    ferrous gluconate  324 mg Oral BID     sodium chloride flush  10 mL Intravenous 2 times per day    nicotine  1 patch Transdermal Daily    gabapentin  400 mg Oral TID     Continuous Infusions:   dextrose       PRN Meds:glucose, dextrose, glucagon (rDNA), dextrose, HYDROcodone-acetaminophen, sodium chloride flush, magnesium hydroxide, ondansetron, guaiFENesin-dextromethorphan  I/O last 3 completed shifts: In: 1 [P.O.:970]  Out: 1500 [Urine:1500]  No intake/output data recorded. Intake/Output Summary (Last 24 hours) at 2019 0839  Last data filed at 2019 0419  Gross per 24 hour   Intake 970 ml   Output 1500 ml   Net -530 ml           Objective:     Vitals: BP (!) 172/82   Pulse 66   Temp 97 °F (36.1 °C) (Oral)   Resp 20   Ht 5' 5\" (1.651 m)   Wt 256 lb 6.3 oz (116.3 kg)   SpO2 97%   BMI 42.67 kg/m²     Physical Exam:  General appearance:  Appears comfortable.  Well nourished. Eyes: Sclera clear, pupils equal, round, and reactive to light. ENT: Moist mucus membranes, no thrush. Trachea midline. Cardiovascular: Regular rhythm, normal S1, S2. No murmur, gallop, rub. No edema in lower extremities. Respiratory: Clear to auscultation bilaterally, +wheezes, good inspiratory effort. Gastrointestinal: Abdomen soft, non-tender, not distended, normal bowel sounds. Musculoskeletal: No cyanosis in digits, neck supple. Neurology: Cranial nerves grossly intact. Alert and oriented in time, place and person. No speech or motor deficits. Psychiatry: Appropriate affect. Not agitated. Skin: Warm, dry, normal turgor, no rash. LABS:    CBC: No results for input(s): WBC, HGB, HCT, MCV, PLT in the last 72 hours. BMP:    Recent Labs     04/07/19  0508      K 5.3*      CO2 18*   BUN 46*   CREATININE 2.6*   GLUCOSE 255*       LFT's: No results for input(s): AST, ALT, ALB, BILITOT, ALKPHOS in the last 72 hours. Troponin: No results for input(s): TROPONINI in the last 72 hours. BNP: No results for input(s): BNP in the last 72 hours. Lipids: No results for input(s): CHOL, HDL in the last 72 hours. Invalid input(s): LDLCALCU    ABGs: No results found for: PHART, [id number], PO2ART    INR: No results for input(s): INR in the last 72 hours. U/A:No results for input(s): NITRITE, COLORU, PHUR, LABCAST, WBCUA, RBCUA, MUCUS, TRICHOMONAS, YEAST, BACTERIA, CLARITYU, SPECGRAV, LEUKOCYTESUR, UROBILINOGEN, BILIRUBINUR, BLOODU, GLUCOSEU, AMORPHOUS in the last 72 hours. Invalid input(s): Margaret Gage   -----------------------------------------------------------------  RAD:   XR CHEST STANDARD (2 VW)   Final Result   1. Interstitial edema, congestive heart failure. Differentials includes interstitial pneumonia. Assessment/Plan:   Abraham Sharma is a 59 y.o. female, who was admitted with Influenza A.      Influenza A pneumonia. Procalcitonin is low. CXR w/intersitial edema. Wheezes. -Pulm consulted  -breathing tx  -continue prednisone 20 mg daily  -tamiflu    Acute hypoxic respiratory failure due to flu, PNA  -Pulm consulted    Sepsis present on admission due to flu/PNA, resolving    Obesity  -counseled weight loss    DMII  -lantus 60 nightly  -increased meal time to 10 units due to steroids  -sliding scale low dose    HTN  -continue home Coreg    LAURIE on CKD secondary to hypovolemia  -s/p IVF  -nephrology consult    Code status: Full Code  FEN: DIET GENERAL; Low Potassium  PPx: Lovenox 30 mg  Discharge planning:      This patient will be staffed with Leti Sharif MD.     [address]  Internal Medicine Resident, PGY-2  4/8/2019  8:39 AM

## 2019-04-08 NOTE — PROGRESS NOTES
Renal Progress Note  Subjective:   Admit Date: 4/4/2019     CARMELITA Beltran is a 59 y.o. female with hx of CKD3 (baseline creatinine ~2.0), afib on amiodarone off AC 2/2 GI bleed, HTN, CHF w/preserved EF, and HLD who presented today with worsening shortness of breath and fever for 2 days. Interval History:     Good UO   Cr better   K elevated       DIET DIET GENERAL; Low Potassium  Medications:   Scheduled Meds:   insulin lispro  5 Units Subcutaneous TID WC    insulin glargine  60 Units Subcutaneous Nightly    predniSONE  20 mg Oral Daily    insulin lispro  0-6 Units Subcutaneous TID WC    insulin lispro  0-3 Units Subcutaneous Nightly    enoxaparin  30 mg Subcutaneous Daily    ipratropium-albuterol  1 ampule Inhalation Q4H WA    oseltamivir  30 mg Oral Daily    amiodarone  200 mg Oral Daily    atorvastatin  40 mg Oral Daily    carvedilol  12.5 mg Oral BID WC    famotidine  20 mg Oral Daily    ferrous gluconate  324 mg Oral BID WC    sodium chloride flush  10 mL Intravenous 2 times per day    nicotine  1 patch Transdermal Daily    gabapentin  400 mg Oral TID     Continuous Infusions:   dextrose         Labs:  CBC:   Recent Labs     04/05/19  0457   WBC 5.6   HGB 9.3*        BMP:    Recent Labs     04/05/19  0457 04/07/19  0508    139   K 4.7 5.3*    104   CO2 21 18*   BUN 35* 46*   CREATININE 3.2* 2.6*   GLUCOSE 137* 255*     Ca/Mg/Phos:   Recent Labs     04/05/19  0457 04/07/19  0508   CALCIUM 8.8 8.3   PHOS  --  4.5     Hepatic:   No results for input(s): AST, ALT, ALB, BILITOT, ALKPHOS in the last 72 hours. Troponin:   No results for input(s): TROPONINI in the last 72 hours. BNP: No results for input(s): BNP in the last 72 hours. Lipids: No results for input(s): CHOL, TRIG, HDL, LDLCALC, LABVLDL in the last 72 hours. ABGs: No results for input(s): PHART, PO2ART, SPW4JII in the last 72 hours. INR: No results for input(s): INR in the last 72 hours.   UA:No results

## 2019-04-08 NOTE — FLOWSHEET NOTE
04/08/19 1600   Encounter Summary   Services provided to: Patient and family together   Referral/Consult From: [facility] Family members   Continue Visiting   (4/8, ana rosa )   Complexity of Encounter Moderate   Length of Encounter 30 minutes   Routine   Type Initial   Assessment Calm; Approachable; Hopeful   Intervention Active listening;Explored feelings, thoughts, concerns; Discussed belief system/Muslim practices/selvin   Outcome Comfort;Expressed gratitude;Engaged in conversation   Sacraments   Communion Patient/Family wants communion

## 2019-04-09 VITALS
WEIGHT: 255.07 LBS | DIASTOLIC BLOOD PRESSURE: 58 MMHG | HEART RATE: 65 BPM | BODY MASS INDEX: 42.5 KG/M2 | TEMPERATURE: 97.2 F | HEIGHT: 65 IN | RESPIRATION RATE: 18 BRPM | SYSTOLIC BLOOD PRESSURE: 172 MMHG | OXYGEN SATURATION: 94 %

## 2019-04-09 LAB
ANION GAP SERPL CALCULATED.3IONS-SCNC: 14 MMOL/L (ref 3–16)
BLOOD CULTURE, ROUTINE: NORMAL
BLOOD CULTURE, ROUTINE: NORMAL
BUN BLDV-MCNC: 42 MG/DL (ref 7–20)
CALCIUM SERPL-MCNC: 8.8 MG/DL (ref 8.3–10.6)
CHLORIDE BLD-SCNC: 106 MMOL/L (ref 99–110)
CO2: 21 MMOL/L (ref 21–32)
CREAT SERPL-MCNC: 1.9 MG/DL (ref 0.6–1.2)
GFR AFRICAN AMERICAN: 33
GFR NON-AFRICAN AMERICAN: 27
GLUCOSE BLD-MCNC: 152 MG/DL (ref 70–99)
GLUCOSE BLD-MCNC: 167 MG/DL (ref 70–99)
GLUCOSE BLD-MCNC: 169 MG/DL (ref 70–99)
PERFORMED ON: ABNORMAL
PERFORMED ON: ABNORMAL
POTASSIUM SERPL-SCNC: 5.2 MMOL/L (ref 3.5–5.1)
SODIUM BLD-SCNC: 141 MMOL/L (ref 136–145)

## 2019-04-09 PROCEDURE — 80048 BASIC METABOLIC PNL TOTAL CA: CPT

## 2019-04-09 PROCEDURE — 6370000000 HC RX 637 (ALT 250 FOR IP): Performed by: INTERNAL MEDICINE

## 2019-04-09 PROCEDURE — 36415 COLL VENOUS BLD VENIPUNCTURE: CPT

## 2019-04-09 PROCEDURE — 94799 UNLISTED PULMONARY SVC/PX: CPT

## 2019-04-09 PROCEDURE — 99232 SBSQ HOSP IP/OBS MODERATE 35: CPT | Performed by: INTERNAL MEDICINE

## 2019-04-09 PROCEDURE — 94664 DEMO&/EVAL PT USE INHALER: CPT

## 2019-04-09 RX ORDER — IPRATROPIUM BROMIDE AND ALBUTEROL SULFATE 2.5; .5 MG/3ML; MG/3ML
3 SOLUTION RESPIRATORY (INHALATION)
Qty: 360 ML | Refills: 0 | Status: ON HOLD
Start: 2019-04-09 | End: 2020-02-19 | Stop reason: CLARIF

## 2019-04-09 RX ORDER — IPRATROPIUM BROMIDE AND ALBUTEROL SULFATE 2.5; .5 MG/3ML; MG/3ML
1 SOLUTION RESPIRATORY (INHALATION) EVERY 4 HOURS PRN
Status: DISCONTINUED | OUTPATIENT
Start: 2019-04-09 | End: 2019-04-09 | Stop reason: HOSPADM

## 2019-04-09 RX ORDER — PREDNISONE 20 MG/1
20 TABLET ORAL DAILY
Qty: 2 TABLET | Refills: 0 | Status: SHIPPED | OUTPATIENT
Start: 2019-04-10 | End: 2019-04-12

## 2019-04-09 RX ADMIN — GUAIFENESIN AND DEXTROMETHORPHAN 5 ML: 100; 10 SYRUP ORAL at 01:10

## 2019-04-09 RX ADMIN — PREDNISONE 20 MG: 20 TABLET ORAL at 08:33

## 2019-04-09 RX ADMIN — ATORVASTATIN CALCIUM 40 MG: 40 TABLET, FILM COATED ORAL at 08:34

## 2019-04-09 RX ADMIN — HYDROCODONE BITARTRATE AND ACETAMINOPHEN 1 TABLET: 7.5; 325 TABLET ORAL at 08:42

## 2019-04-09 RX ADMIN — INSULIN LISPRO 1 UNITS: 100 INJECTION, SOLUTION INTRAVENOUS; SUBCUTANEOUS at 08:39

## 2019-04-09 RX ADMIN — FERROUS GLUCONATE TAB 324 MG (37.5 MG ELEMENTAL IRON) 324 MG: 324 (37.5 FE) TAB at 08:33

## 2019-04-09 RX ADMIN — OSELTAMIVIR PHOSPHATE 30 MG: 30 CAPSULE ORAL at 08:35

## 2019-04-09 RX ADMIN — CARVEDILOL 12.5 MG: 12.5 TABLET, FILM COATED ORAL at 08:34

## 2019-04-09 RX ADMIN — GABAPENTIN 400 MG: 400 CAPSULE ORAL at 08:34

## 2019-04-09 RX ADMIN — FAMOTIDINE 20 MG: 20 TABLET ORAL at 08:34

## 2019-04-09 RX ADMIN — HYDROCODONE BITARTRATE AND ACETAMINOPHEN 1 TABLET: 7.5; 325 TABLET ORAL at 00:19

## 2019-04-09 RX ADMIN — INSULIN LISPRO 1 UNITS: 100 INJECTION, SOLUTION INTRAVENOUS; SUBCUTANEOUS at 12:55

## 2019-04-09 RX ADMIN — AMIODARONE HYDROCHLORIDE 200 MG: 200 TABLET ORAL at 08:34

## 2019-04-09 ASSESSMENT — PAIN DESCRIPTION - LOCATION: LOCATION: GENERALIZED

## 2019-04-09 ASSESSMENT — PAIN DESCRIPTION - DESCRIPTORS: DESCRIPTORS: CONSTANT

## 2019-04-09 ASSESSMENT — PAIN DESCRIPTION - FREQUENCY: FREQUENCY: CONTINUOUS

## 2019-04-09 ASSESSMENT — PAIN DESCRIPTION - PAIN TYPE: TYPE: CHRONIC PAIN

## 2019-04-09 ASSESSMENT — PAIN DESCRIPTION - ONSET: ONSET: ON-GOING

## 2019-04-09 ASSESSMENT — PAIN DESCRIPTION - PROGRESSION: CLINICAL_PROGRESSION: NOT CHANGED

## 2019-04-09 ASSESSMENT — PAIN SCALES - GENERAL
PAINLEVEL_OUTOF10: 10
PAINLEVEL_OUTOF10: 9

## 2019-04-09 NOTE — DISCHARGE SUMMARY
INTERNAL MEDICINE DEPARTMENT AT [address] [place] [address]  DISCHARGE SUMMARY    Patient ID: Kevin Chen                                             Discharge Date: 4/9/2019   Patient's PCP: Silva Burris MD                                          Discharge Physician: Toya Simmons Date: 4/4/2019   Admitting Physician: Robin Diego MD    PROBLEMS DURING HOSPITALIZATION:  Present on Admission:   Influenza A   [facility] interstitial [facility]   Acute renal failure superimposed on chronic kidney disease ([facility]   Uncontrolled type 2 diabetes mellitus with hyperglycemia ([facility]      DISCHARGE DIAGNOSES:    HPI: CC: SOB, fever for 2 days  \"59 y.o. female who presented to [facility] with above complaints. Symptom onset has been acute for a time period of 2 day(s). Severity is described as mild-moderate. Course of her symptoms over time is constant and worsening. Associated with diff breathing   Not Associated with nausea   No h/o of recent travel, ill contacts,weight loss. Pain description - Location no pain. \"    The following issues were addressed during hospitalization:    Patient was found to have influenza A pneumonia. She was treated with steroids and Tamiflu, completed a 5 day course. She was seen by pulmonology. Her insulin was titrated due to steroids. She also had LAURIE on CKD on admission, was seen by nephrology, which was treated with IV fluids and resolved. Physical Exam:  BP (!) 172/58   Pulse 65   Temp 97.2 °F (36.2 °C) (Oral)   Resp 18   Ht 5' 5\" (1.651 m)   Wt 255 lb 1.2 oz (115.7 kg)   SpO2 94%   BMI 42.45 kg/m²     General appearance:  Appears comfortable. Well nourished. Eyes: Sclera clear, pupils equal, round, and reactive to light. ENT: Moist mucus membranes, no thrush. Trachea midline. Cardiovascular: Regular rhythm, normal S1, S2. No murmur, gallop, rub. No edema in lower extremities.   Respiratory: Clear to auscultation bilaterally, NO wheezes or rales, good inspiratory effort. Gastrointestinal: Abdomen soft, non-tender, not distended, normal bowel sounds. Musculoskeletal: No cyanosis in digits, neck supple. Neurology: Cranial nerves grossly intact. Alert and oriented in time, place and person. No speech or motor deficits. Psychiatry: Appropriate affect. Not agitated. Skin: Warm, dry, normal turgor, no rash.     Consults: pulmonary and nephrology  Significant Diagnostic Studies:  chest x-ray  Treatments: IV hydration and steroids: prednisone, Tamiflu  Disposition: home  Discharged Condition: Stable  Follow Up: Primary Care Physician in one week    DISCHARGE MEDICATION:     Medication List      START taking these medications    ipratropium-albuterol 0.5-2.5 (3) MG/3ML Soln nebulizer solution  Commonly known as:  DUONEB  Inhale 3 mLs into the lungs every 4 hours (while awake)     predniSONE 20 MG tablet  Commonly known as:  DELTASONE  Take 1 tablet by mouth daily for 2 days  Start taking on:  4/10/2019  Notes to patient:  Prednisone  USE--  Reduce inflammation, help breathing  SIDE EFFECTS--  Upset stomach; trouble sleeping; high blood sugars        CONTINUE taking these medications    amiodarone 200 MG tablet  Commonly known as:  CORDARONE  TAKE 1 TABLET BY MOUTH DAILY     amLODIPine 10 MG tablet  Commonly known as:  NORVASC  Take 1 tablet by mouth daily     atorvastatin 40 MG tablet  Commonly known as:  LIPITOR     carvedilol 12.5 MG tablet  Commonly known as:  COREG     famotidine 40 MG tablet  Commonly known as:  PEPCID  Take 1 tablet by mouth 2 times daily     ferrous gluconate 324 (38 Fe) MG tablet  Commonly known as:  FERGON     gabapentin 800 MG tablet  Commonly known as:  NEURONTIN     HYDROcodone-acetaminophen 7.5-325 MG per tablet  Commonly known as:  NORCO     LANTUS SOLOSTAR 100 UNIT/ML injection pen  Generic drug:  insulin glargine     Roller Walker Misc  1 each by Does not apply route daily     varenicline 0.5 MG tablet  Commonly known as:

## 2019-04-09 NOTE — PROGRESS NOTES
CLINICAL PHARMACY NOTE: MEDS TO [address] Select Patient?: No  Total # of Prescriptions Filled: 1   The following medications were delivered to the patient:  · Prednisone 20mg  Total # of Interventions Completed: 1  Time Spent (min): 60    Additional Documentation:

## 2019-04-09 NOTE — PROGRESS NOTES
Renal Progress Note  Subjective:   Admit Date: 4/4/2019     HPI Grayson Esparza is a 59 y.o. female with hx of CKD3 (baseline creatinine ~2.0), afib on amiodarone off AC 2/2 GI bleed, HTN, CHF w/preserved EF, and HLD who presented today with worsening shortness of breath and fever for 2 days. Interval History:     Good UO   Cr better   K better         DIET DIET GENERAL; Low Sodium (2 GM); Low Potassium  Medications:   Scheduled Meds:   [START ON 4/10/2019] enoxaparin  40 mg Subcutaneous Daily    insulin lispro  10 Units Subcutaneous TID WC    insulin glargine  70 Units Subcutaneous Nightly    predniSONE  20 mg Oral Daily    insulin lispro  0-6 Units Subcutaneous TID WC    insulin lispro  0-3 Units Subcutaneous Nightly    ipratropium-albuterol  1 ampule Inhalation Q4H WA    oseltamivir  30 mg Oral Daily    amiodarone  200 mg Oral Daily    atorvastatin  40 mg Oral Daily    carvedilol  12.5 mg Oral BID WC    famotidine  20 mg Oral Daily    ferrous gluconate  324 mg Oral BID WC    sodium chloride flush  10 mL Intravenous 2 times per day    nicotine  1 patch Transdermal Daily    gabapentin  400 mg Oral TID     Continuous Infusions:   dextrose         Labs:  CBC:   No results for input(s): WBC, HGB, PLT in the last 72 hours. BMP:    Recent Labs     04/07/19  0508 04/08/19  1342 04/09/19  0503    144 141   K 5.3* 5.4* 5.2*    107 106   CO2 18* 22 21   BUN 46* 45* 42*   CREATININE 2.6* 2.1* 1.9*   GLUCOSE 255* 156* 167*     Ca/Mg/Phos:   Recent Labs     04/07/19  0508 04/08/19  1342 04/09/19  0503   CALCIUM 8.3 8.7 8.8   PHOS 4.5 2.9  --      Hepatic:   No results for input(s): AST, ALT, ALB, BILITOT, ALKPHOS in the last 72 hours. Troponin:   No results for input(s): TROPONINI in the last 72 hours. BNP: No results for input(s): BNP in the last 72 hours. Lipids: No results for input(s): CHOL, TRIG, HDL, LDLCALC, LABVLDL in the last 72 hours.   ABGs: No results for input(s): PHART, PO2ART, KMA0UWT in the last 72 hours. INR: No results for input(s): INR in the last 72 hours. UA:No results for input(s): Bibiana Chesterbrook, GLUCOSEU, BILIRUBINUR, Ata Show, BLOODU, PHUR, PROTEINU, UROBILINOGEN, NITRU, LEUKOCYTESUR, LABMICR, URINETYPE in the last 72 hours. Urine Microscopic: No results for input(s): LABCAST, BACTERIA, COMU, HYALCAST, WBCUA, RBCUA, EPIU in the last 72 hours. Urine Culture: No results for input(s): LABURIN in the last 72 hours. Urine Chemistry: No results for input(s): Lisha Dears, PROTEINUR, NAUR in the last 72 hours. Objective:   Vitals: BP (!) 172/58   Pulse 65   Temp 97.2 °F (36.2 °C) (Oral)   Resp 18   Ht 5' 5\" (1.651 m)   Wt 255 lb 1.2 oz (115.7 kg)   SpO2 94%   BMI 42.45 kg/m²    Wt Readings from Last 3 Encounters:   04/09/19 255 lb 1.2 oz (115.7 kg)   03/12/19 272 lb (123.4 kg)   01/29/19 280 lb (127 kg)      24HR INTAKE/OUTPUT:      Intake/Output Summary (Last 24 hours) at 4/9/2019 1041  Last data filed at 4/9/2019 9475  Gross per 24 hour   Intake 1440 ml   Output --   Net 1440 ml     Constitutional:  Alert, awake, no apparent distress  NECK: supple, no JVD  Cardiovascular:  S1, S2 without m/r/g  Respiratory:  Course air movement bilaterally  Abdomen: +bs, soft, nt  Ext: no LE edema    Assessment and Plan:       IMAGING:  XR CHEST STANDARD (2 VW)   Final Result   1. Interstitial edema, congestive heart failure. Differentials includes interstitial pneumonia.           Assessment/Plan     LAURIE on CKD 3 - sec to hypovolemia   - Cr better   - off IVF   - renal panel in am   - BP better     HTN  - controlled     Anemia  -iron studies      Acid-Base/Electrolytes  -Has hx of hyperkalemia, continue to follow   - dec K in diet   - Labs in AM    Yumiko Sousa MD

## 2019-04-09 NOTE — PROGRESS NOTES
[address] Daily Progress Note      Admit Date:  4/4/2019    Subjective:  Ms. Fang Good was seen and examined. F/U Flu/Pneuonitis/CHF. Feels much better. Cough better. Breathing better. Tessy Never No chest pain. Objective:   BP (!) 172/58   Pulse 65   Temp 97.2 °F (36.2 °C) (Oral)   Resp 18   Ht 5' 5\" (1.651 m)   Wt 255 lb 1.2 oz (115.7 kg)   SpO2 94%   BMI 42.45 kg/m²       Intake/Output Summary (Last 24 hours) at 4/9/2019 1234  Last data filed at 4/9/2019 2210  Gross per 24 hour   Intake 1440 ml   Output --   Net 1440 ml       TELEMETRY: Sinus     Physical Exam:  General:  Awake, alert, NAD  Skin:  Warm and dry  Neck:  JVP difficult   Chest:  Cough. Diffuse wheeze  Cardiovascular:  RRR S1S2  Abdomen:  Soft nontender  Extremities:  no edema    Medications:    [START ON 4/10/2019] enoxaparin  40 mg Subcutaneous Daily    insulin lispro  10 Units Subcutaneous TID WC    insulin glargine  70 Units Subcutaneous Nightly    predniSONE  20 mg Oral Daily    insulin lispro  0-6 Units Subcutaneous TID WC    insulin lispro  0-3 Units Subcutaneous Nightly    amiodarone  200 mg Oral Daily    atorvastatin  40 mg Oral Daily    carvedilol  12.5 mg Oral BID WC    famotidine  20 mg Oral Daily    ferrous gluconate  324 mg Oral BID WC    sodium chloride flush  10 mL Intravenous 2 times per day    nicotine  1 patch Transdermal Daily    gabapentin  400 mg Oral TID      dextrose       ipratropium-albuterol, glucose, dextrose, glucagon (rDNA), dextrose, HYDROcodone-acetaminophen, sodium chloride flush, magnesium hydroxide, ondansetron, guaiFENesin-dextromethorphan    Lab Data:  CBC:   No results for input(s): WBC, HGB, HCT, MCV, PLT in the last 72 hours.   BMP:   Recent Labs     04/07/19  0508 04/08/19  1342 04/09/19  0503    144 141   K 5.3* 5.4* 5.2*    107 106   CO2 18* 22 21   PHOS 4.5 2.9  --    BUN 46* 45* 42*   CREATININE 2.6* 2.1* 1.9*     LIVER PROFILE:   No results for input(s): AST, ALT, LIPASE, BILIDIR, BILITOT, ALKPHOS in the last 72 hours. Invalid input(s): AMYLASE,  ALB  PT/INR: No results for input(s): PROTIME, INR in the last 72 hours. APTT: No results for input(s): APTT in the last 72 hours. BNP:  No results for input(s): BNP in the last 72 hours. IMAGING:     Assessment:  Patient Active Problem List    Diagnosis Date Noted    Anemia 05/24/2017     Priority: Low    On amiodarone therapy 09/11/2016     Priority: Low    Hyperlipidemia 09/11/2016     Priority: Low    Colitis presumed infectious      Priority: Low    Persistent [facility] fibrillation ([facility] 06/01/2016     Priority: Low    Sepsis ([facility] 06/01/2016     Priority: Low    Acute renal failure (HCC)      Priority: Low    Non-intractable vomiting with nausea      Priority: Low    Lactic acidemia      Priority: Low    Leukocytosis      Priority: Low    [facility]diabetic k[facility]acidoses) ([facility])      Priority: Low    Pneumonia, interstitial ([facility] 04/07/2019    Acute renal failure superimposed on chronic kidney disease ([facility] 04/07/2019    Uncontrolled type 2 diabetes mellitus with hyperglycemia ([facility] 04/07/2019    Influenza A 04/05/2019    CHF (congestive heart failure), NYHA class I, acute on chronic, combined ([facility] 04/04/2019    Leg swelling 03/12/2019    Venous insufficiency 03/12/2019    Chronic venous htn w inflammation of bilateral low extrm 03/12/2019       Plan:  1. Feeling and breathing much better. No chest pain. Cough better. Likely home today.          Core Measures:  · Discharge instructions:   · LVEF documented:   · ACEI for LV dysfunction:   · Smoking Cessation:    Jules Johnson MD 4/9/2019 12:34 PM

## 2019-04-09 NOTE — PLAN OF CARE
Problem: Pain:  Goal: Pain level will decrease  Description  Pain level will decrease  4/9/2019 0141 by Sawyer Guillermo RN  Outcome: Ongoing  Note:   Pt complains of generalized chronic pain this shift. RN administers PRN pain medication per orders. Upon reassessment, pt resting with RR>10. Will continue to monitor. Problem: HEMODYNAMIC STATUS  Goal: Patient has stable vital signs and fluid balance  Outcome: Ongoing  Note:   VSS this shift. Pt does not complain of chest pain or shortness of breath. Will continue to monitor. Problem: Activity:  Goal: Ability to return to normal activity level will improve  Description  Ability to return to normal activity level will improve  Outcome: Ongoing  Note:   Pt remains in isolation for flu. PO tamiflu still being administered. Pt receives PRN cough medicine for productive strong cough. Will continue to monitor.

## 2019-04-10 RX ORDER — ALBUTEROL SULFATE 90 UG/1
2 AEROSOL, METERED RESPIRATORY (INHALATION) 4 TIMES DAILY PRN
Qty: 1 INHALER | Refills: 0 | Status: ON HOLD | OUTPATIENT
Start: 2019-04-10 | End: 2020-02-19 | Stop reason: CLARIF

## 2019-04-12 NOTE — PROGRESS NOTES
[address]   Congestive Heart Failure    Primary Care Doctor:  Lovely Farrell MD  Primary Cardiologist: Isaias Guerrero Pt: No    Chief Complaint:  dyspnea    History of Present Illness:  Carmelita Bolton is a 59 y.o. female with PMH AF, HTN, HFpEF, HLD, DM who presents today for hospital f/u. Carmelita Bolton was admitted 4/4/19 and discharged 4/9/19. Hospital course: found to have influenza A pneumonia. She was treated with steroids and Tamiflu, completed a 5 day course. She was seen by pulmonology. Her insulin was titrated due to steroids. She also had LAURIE on CKD on admission, was seen by nephrology, which was treated with IV fluids and resolved. Since hospitalization she reports dyspnea that is improving, fatigue and denies chest pain, palpitations, orthopnea, PND, exertional chest pressure/discomfort, early saiety, edema, syncope. hospital weight on d/c was 255lb and discharge home weight was does not weigh at home    Baseline Weight: 255lb    Admit BNP: 2340       EF: 55-60%  Cardiac Imaging: Echo 5/31/16  Normal left ventricle size, wall thickness and systolic function with an   estimated ejection fraction of 55-60%.   No regional wall motion abnormalities are seen.   Mitral annular calcification   Mild mitral regurgitation   Patient in atrial arrhythmia throughout the study   Mild tricuspid regurgitation with RVSP estimated at 40 mmHg.     Device: No      Activity: below baseline   Can you walk 1-2 blocks or do a moderate amount of house/yard work? No, due to knee pain  Cardiac Rehab Referral: No     NYHA Class: II     Sodium Restrictions: 2g  Fluid Restrictions: 48-64 oz/day  Sodium and fluid restriction compliance: fair    Pt Education: The patient has received education on the following topics: dietary sodium restriction, heart failure medications, the importance of physical activity, symptom management and weight monitoring         Past Medical History:   has a past medical history heart sounds and intact distal pulses. Pulmonary/Chest: Effort normal. She has wheezes. Abdominal: Soft. Musculoskeletal: Normal range of motion. Neurological: She is alert and oriented to person, place, and time. Skin: Skin is warm and dry. Psychiatric: She has a normal mood and affect. Her behavior is normal. Judgment and thought content normal.       Lab Data:    CBC:   Lab Results   Component Value Date    WBC 5.6 04/05/2019    WBC 9.0 04/04/2019    WBC 9.2 05/20/2018    RBC 3.37 04/05/2019    RBC 3.30 04/04/2019    RBC 3.24 05/20/2018    HGB 9.3 04/05/2019    HGB 9.0 04/04/2019    HGB 9.0 05/20/2018    HCT 29.2 04/05/2019    HCT 28.4 04/04/2019    HCT 28.3 05/20/2018    MCV 86.5 04/05/2019    MCV 86.1 04/04/2019    MCV 87.2 05/20/2018    RDW 19.0 04/05/2019    RDW 19.4 04/04/2019    RDW 16.9 05/20/2018     04/05/2019     04/04/2019     05/20/2018     BMP:  Lab Results   Component Value Date     04/09/2019     04/08/2019     04/07/2019    K 5.2 04/09/2019    K 5.4 04/08/2019    K 5.3 04/07/2019    K 4.7 04/05/2019    K 4.5 04/04/2019     04/09/2019     04/08/2019     04/07/2019    CO2 21 04/09/2019    CO2 22 04/08/2019    CO2 18 04/07/2019    PHOS 2.9 04/08/2019    PHOS 4.5 04/07/2019    PHOS 4.0 01/29/2019    BUN 42 04/09/2019    BUN 45 04/08/2019    BUN 46 04/07/2019    CREATININE 1.9 04/09/2019    CREATININE 2.1 04/08/2019    CREATININE 2.6 04/07/2019     BNP:   Lab Results   Component Value Date    PROBNP 2,340 04/04/2019     Iron Studies:    Lab Results   Component Value Date    FERRITIN 530.9 06/01/2016     Iron Deficiency Anemia:  No IV Iron Therapy:  No  2017 ACC/AHA HF Guidelines:   intravenous iron replacement in patients with New York Heart Association (NYHA) class II and III HF and iron deficiency(ferritin <100 ng/ml or 100-300 ng/ml if transferrin saturation <20%), to improve functional status and QoL. Assessment/Plan:    1. CHF (congestive heart failure), NYHA class I, acute on chronic, combined ([facility] [address] - compensated   2. Persistent atrial fibrillation (HCC) - on Amio         Instructions:   1. Medications: continue current medications  2. Labs: per nephrology  3. Lifestyle Recommendations: Weigh yourself every day in the morning after urination, call Reny Cardosos if wt increases 2-3lb in one day or 5lb in one week, Limit sodium to 2000mg/day and fluids to 2L or 64oz/day. Add a fish oil supplement. 4. Follow up: Dr. Kaylene Medina as scheduled      Heart Failure Hotline: 02 [phone] - [facility]    Heart Failure Websites:   www.heart. org  Www.cardiosmart. org    Websites with Low Sodium Recipes:   www.mayoclinic.org/healthy-lifestyle/recipes/low-sodium-recipes  www.TidalScale/recipes    Smartphone shonda's that can help you keep track of symptoms, weights, and medications:  Pluto.TV    Nutrition Shonda to track calories, carbohydrates and sodium content of food:   CalorieKing  [employer]      I appreciate the opportunity of cooperating in the care of this individual.    Rylee Abrams CNP, 4/12/2019, 9:06 AM    QUALITY MEASURES  1. Tobacco Cessation Counseling: NA  2. Retake of BP if >140/90:   NA  3. Documentation to PCP/referring for new patient:  Sent to PCP at close of office visit  4. CAD patient on anti-platelet: NA  5. CAD patient on STATIN therapy:  NA  6. Patient with CHF and aFib on anticoagulation:  No   7. Patient Education:  Yes   8. BB for LVSD :  NA   9. ACE/ARB for LVSD:  NA   10.  Left Ventricular Ejection Fraction (LVEF) Assessment:  Yes

## 2019-04-15 ENCOUNTER — OFFICE VISIT (OUTPATIENT)
Dept: CARDIOLOGY CLINIC | Age: 59
End: 2019-04-15
Payer: MEDICARE

## 2019-04-15 VITALS
DIASTOLIC BLOOD PRESSURE: 60 MMHG | OXYGEN SATURATION: 98 % | BODY MASS INDEX: 42.85 KG/M2 | HEART RATE: 83 BPM | SYSTOLIC BLOOD PRESSURE: 130 MMHG | WEIGHT: 257.2 LBS | HEIGHT: 65 IN

## 2019-04-15 DIAGNOSIS — I48.19 PERSISTENT ATRIAL FIBRILLATION (HCC): ICD-10-CM

## 2019-04-15 DIAGNOSIS — I50.43 CHF (CONGESTIVE HEART FAILURE), NYHA CLASS I, ACUTE ON CHRONIC, COMBINED (HCC): Primary | ICD-10-CM

## 2019-04-15 PROCEDURE — G8417 CALC BMI ABV UP PARAM F/U: HCPCS | Performed by: NURSE PRACTITIONER

## 2019-04-15 PROCEDURE — 1111F DSCHRG MED/CURRENT MED MERGE: CPT | Performed by: NURSE PRACTITIONER

## 2019-04-15 PROCEDURE — G8427 DOCREV CUR MEDS BY ELIG CLIN: HCPCS | Performed by: NURSE PRACTITIONER

## 2019-04-15 PROCEDURE — 3017F COLORECTAL CA SCREEN DOC REV: CPT | Performed by: NURSE PRACTITIONER

## 2019-04-15 PROCEDURE — 99214 OFFICE O/P EST MOD 30 MIN: CPT | Performed by: NURSE PRACTITIONER

## 2019-04-15 PROCEDURE — 1036F TOBACCO NON-USER: CPT | Performed by: NURSE PRACTITIONER

## 2019-04-15 ASSESSMENT — ENCOUNTER SYMPTOMS
COUGH: 1
GASTROINTESTINAL NEGATIVE: 1
SHORTNESS OF BREATH: 1
WHEEZING: 1

## 2019-04-15 NOTE — PATIENT INSTRUCTIONS
Instructions:   1. Medications: continue current medications  2. Labs: per nephrology  3. Lifestyle Recommendations: Weigh yourself every day in the morning after urination, call Lorena Paget if wt increases 2-3lb in one day or 5lb in one week, Limit sodium to 2000mg/day and fluids to 2L or 64oz/day. Add a fish oil supplement.    4. Follow up: Dr. Ludmila Abad as scheduled

## 2019-04-16 ENCOUNTER — PROCEDURE VISIT (OUTPATIENT)
Dept: SURGERY | Age: 59
End: 2019-04-16
Payer: MEDICARE

## 2019-04-16 DIAGNOSIS — I87.2 VENOUS INSUFFICIENCY: ICD-10-CM

## 2019-04-16 DIAGNOSIS — M79.89 LEG SWELLING: ICD-10-CM

## 2019-04-16 DIAGNOSIS — I87.323 CHRONIC VENOUS HTN W INFLAMMATION OF BILATERAL LOW EXTRM: ICD-10-CM

## 2019-04-16 PROCEDURE — 93970 EXTREMITY STUDY: CPT | Performed by: SURGERY

## 2019-04-30 ENCOUNTER — OFFICE VISIT (OUTPATIENT)
Dept: CARDIOLOGY CLINIC | Age: 59
End: 2019-04-30
Payer: MEDICARE

## 2019-04-30 VITALS
WEIGHT: 261 LBS | HEART RATE: 64 BPM | BODY MASS INDEX: 43.43 KG/M2 | DIASTOLIC BLOOD PRESSURE: 80 MMHG | SYSTOLIC BLOOD PRESSURE: 150 MMHG

## 2019-04-30 DIAGNOSIS — I10 ESSENTIAL HYPERTENSION: ICD-10-CM

## 2019-04-30 DIAGNOSIS — I48.0 PAROXYSMAL ATRIAL FIBRILLATION (HCC): ICD-10-CM

## 2019-04-30 DIAGNOSIS — R94.31 ABNORMAL EKG: ICD-10-CM

## 2019-04-30 DIAGNOSIS — I50.31 ACUTE DIASTOLIC CONGESTIVE HEART FAILURE (HCC): Primary | ICD-10-CM

## 2019-04-30 PROCEDURE — 3017F COLORECTAL CA SCREEN DOC REV: CPT | Performed by: INTERNAL MEDICINE

## 2019-04-30 PROCEDURE — 99214 OFFICE O/P EST MOD 30 MIN: CPT | Performed by: INTERNAL MEDICINE

## 2019-04-30 PROCEDURE — 1036F TOBACCO NON-USER: CPT | Performed by: INTERNAL MEDICINE

## 2019-04-30 PROCEDURE — G8417 CALC BMI ABV UP PARAM F/U: HCPCS | Performed by: INTERNAL MEDICINE

## 2019-04-30 PROCEDURE — 1111F DSCHRG MED/CURRENT MED MERGE: CPT | Performed by: INTERNAL MEDICINE

## 2019-04-30 PROCEDURE — G8428 CUR MEDS NOT DOCUMENT: HCPCS | Performed by: INTERNAL MEDICINE

## 2019-04-30 NOTE — PROGRESS NOTES
Subjective:      Patient ID: Carmelita Bolton is a 62 y.o. female. HPI Mrs. Valerie Browning is here today for follow up  for afib/BP/abn ekg and recent hosp for PNA/Flu/CHF. Feeling much better. Still with cough and sputum but much better. Breathing. Rhythm stable. No tachycardia. No syncope. BP up some but being addressed by PCP/renal.  No pnd or orthopnea. No palp.       Past Medical History:   Diagnosis Date    [facility])     Chronic kidney disease     Diabetes mellitus ([facility] [address]     Hyperlipidemia     Hypertension     MDRO (multiple drug resistant organisms) resistance 6/3/2016    E Coli-urine     Past Surgical History:   Procedure Laterality Date    COLONOSCOPY      ENDOSCOPY, COLON, DIAGNOSTIC      TUBAL LIGATION           Allergies   Allergen Reactions    Dicumarol      Other    Warfarin And Related Hives     Other          Social History     Socioeconomic History    Marital status: Single     Spouse name: Not on file    Number of children: Not on file    Years of education: Not on file    Highest education level: Not on file   Occupational History    Not on file   Social Needs    Financial resource strain: Not on file    Food insecurity:     Worry: Not on file     Inability: Not on file    Transportation needs:     Medical: Not on file     Non-medical: Not on file   Tobacco Use    Smoking status: Former Smoker     Types: Cigarettes     Last attempt to quit: 2016     Years since quittin.3    Smokeless tobacco: Never Used   Substance and Sexual Activity    Alcohol use: Yes     Comment: about 1 drink weekly    Drug use: No    Sexual activity: Not on file   Lifestyle    Physical activity:     Days per week: Not on file     Minutes per session: Not on file    Stress: Not on file   Relationships    Social connections:     Talks on phone: Not on file     Gets together: Not on file     Attends Gnosticist service: Not on file     Active member of club or organization: Not on file     Attends meetings of clubs or organizations: Not on file     Relationship status: Not on file    Intimate partner violence:     Fear of current or ex partner: Not on file     Emotionally abused: Not on file     Physically abused: Not on file     Forced sexual activity: Not on file   Other Topics Concern    Not on file   Social History Narrative    Not on file        FH reviewed, noncontributory      Patient  has a past medical history of CHF (congestive heart failure) ([facility] [address] Chronic kidney disease, Diabetes mellitus ([facility] [address] Hyperlipidemia, Hypertension, and MDRO (multiple drug resistant organisms) resistance. Current Outpatient Medications   Medication Sig Dispense Refill    albuterol sulfate  (90 Base) MCG/ACT inhaler Inhale 2 puffs into the lungs 4 times daily as needed for Wheezing 1 Inhaler 0    ipratropium-albuterol (DUONEB) 0.5-2.5 (3) MG/3ML SOLN nebulizer solution Inhale 3 mLs into the lungs every 4 hours (while awake) 360 mL 0    carvedilol (COREG) 12.5 MG tablet Take 12.5 mg by mouth 2 times daily (with meals)      insulin glargine (LANTUS SOLOSTAR) 100 UNIT/ML injection pen Inject 68 Units into the skin nightly      ferrous gluconate (FERGON) 324 (38 Fe) MG tablet Take 324 mg by mouth 2 times daily (with meals)      amLODIPine (NORVASC) 10 MG tablet Take 1 tablet by mouth daily 30 tablet 5    amiodarone (CORDARONE) 200 MG tablet TAKE 1 TABLET BY MOUTH DAILY 30 tablet 5    Misc. Devices (ROLLER WALKER) MISC 1 each by Does not apply route daily 1 each 0    HYDROcodone-acetaminophen (NORCO) 7.5-325 MG per tablet Take 1 tablet by mouth every 6 hours as needed for Pain .       famotidine (PEPCID) 40 MG tablet Take 1 tablet by mouth 2 times daily 60 tablet 3    atorvastatin (LIPITOR) 40 MG tablet Take 40 mg by mouth daily      gabapentin (NEURONTIN) 800 MG tablet Take 800 mg by mouth 3 times daily       No current facility-administered medications for this visit.         Vitals:    04/30/19 1258   BP: (!) 150/80   Pulse: 64       Wt 280    Review of Systems   Constitutional: Negative for activity change, appetite change and fatigue. Respiratory: Negative for cough, choking, chest tightness and shortness of breath. Cardiovascular: Negative for chest pain, palpitations and leg swelling. Denies PND or orthopnea. No tachycardia or syncope. Neurological: Negative for dizziness, syncope and headaches. Psychiatric/Behavioral: Negative for agitation, behavioral problems and confusion. Other systems reviewed negative as done. Objective:   Physical Exam   Constitutional: She is oriented to person, place, and time. She appears well-developed and well-nourished. No distress. HENT:   Head: Normocephalic and atraumatic. Eyes: Conjunctivae and EOM are normal. Right eye exhibits no discharge. Left eye exhibits no discharge. Neck: Normal range of motion. No JVD present. Cardiovascular: Normal rate, regular rhythm and normal heart sounds. Exam reveals no gallop. No murmur heard. Pulmonary/Chest: Effort normal and breath sounds normal. No respiratory distress. She has no wheezes. She has no rales. Abdominal: Soft. Bowel sounds are normal. There is no tenderness. Musculoskeletal: Normal range of motion. She exhibits no edema. Neurological: She is alert and oriented to person, place, and time. Skin: Skin is warm and dry. Psychiatric: She has a normal mood and affect. Her behavior is normal. Thought content normal.       Assessment:       Diagnosis Orders   1. Acute diastolic congestive heart failure (HCC)     2. Paroxysmal atrial fibrillation ([address]     3. Essential hypertension     4. Abnormal EKG             Plan:      CV stable. Recent hosp for CHF was primarily Flu/PNA.  much better. Compensated. Rhythm stable. Continue amio to maintain sinus. No ac due to GI bleed. Stopped smoking. No changes. Reviewed previous records and testing.

## 2019-05-07 PROBLEM — J10.1 INFLUENZA A: Status: RESOLVED | Noted: 2019-04-05 | Resolved: 2019-05-07

## 2019-06-10 RX ORDER — AMIODARONE HYDROCHLORIDE 200 MG/1
200 TABLET ORAL DAILY
Qty: 90 TABLET | Refills: 3 | Status: ON HOLD | OUTPATIENT
Start: 2019-06-10 | End: 2020-02-25 | Stop reason: HOSPADM

## 2019-06-10 NOTE — TELEPHONE ENCOUNTER
Last Office Visit: 4/30/19    Next Office Visit: 7/30/19    Last Refill: 12/4/18    Last Labs: 4/9/19  Requested Prescriptions     Pending Prescriptions Disp Refills    amiodarone (CORDARONE) 200 MG tablet [Pharmacy Med Name: AMIODARONE 200MG TABLETS] 90 tablet 3     Sig: TAKE 1 TABLET BY MOUTH DAILY

## 2019-07-30 ENCOUNTER — OFFICE VISIT (OUTPATIENT)
Dept: CARDIOLOGY CLINIC | Age: 59
End: 2019-07-30
Payer: MEDICARE

## 2019-07-30 VITALS
WEIGHT: 258 LBS | SYSTOLIC BLOOD PRESSURE: 130 MMHG | BODY MASS INDEX: 42.93 KG/M2 | DIASTOLIC BLOOD PRESSURE: 80 MMHG | HEART RATE: 68 BPM

## 2019-07-30 DIAGNOSIS — I10 ESSENTIAL HYPERTENSION: ICD-10-CM

## 2019-07-30 DIAGNOSIS — I50.31 ACUTE DIASTOLIC CONGESTIVE HEART FAILURE (HCC): Primary | ICD-10-CM

## 2019-07-30 DIAGNOSIS — R94.31 ABNORMAL EKG: ICD-10-CM

## 2019-07-30 DIAGNOSIS — I48.0 PAROXYSMAL ATRIAL FIBRILLATION (HCC): ICD-10-CM

## 2019-07-30 PROCEDURE — 3017F COLORECTAL CA SCREEN DOC REV: CPT | Performed by: INTERNAL MEDICINE

## 2019-07-30 PROCEDURE — 99214 OFFICE O/P EST MOD 30 MIN: CPT | Performed by: INTERNAL MEDICINE

## 2019-07-30 PROCEDURE — 1036F TOBACCO NON-USER: CPT | Performed by: INTERNAL MEDICINE

## 2019-07-30 PROCEDURE — G8417 CALC BMI ABV UP PARAM F/U: HCPCS | Performed by: INTERNAL MEDICINE

## 2019-07-30 PROCEDURE — G8427 DOCREV CUR MEDS BY ELIG CLIN: HCPCS | Performed by: INTERNAL MEDICINE

## 2019-10-29 ENCOUNTER — OFFICE VISIT (OUTPATIENT)
Dept: CARDIOLOGY CLINIC | Age: 59
End: 2019-10-29
Payer: MEDICARE

## 2019-10-29 VITALS
SYSTOLIC BLOOD PRESSURE: 130 MMHG | DIASTOLIC BLOOD PRESSURE: 60 MMHG | BODY MASS INDEX: 42.43 KG/M2 | HEART RATE: 60 BPM | WEIGHT: 255 LBS

## 2019-10-29 DIAGNOSIS — R94.31 ABNORMAL EKG: ICD-10-CM

## 2019-10-29 DIAGNOSIS — I10 ESSENTIAL HYPERTENSION: ICD-10-CM

## 2019-10-29 DIAGNOSIS — I50.32 CHRONIC DIASTOLIC CONGESTIVE HEART FAILURE (HCC): Primary | ICD-10-CM

## 2019-10-29 DIAGNOSIS — I48.0 PAROXYSMAL ATRIAL FIBRILLATION (HCC): ICD-10-CM

## 2019-10-29 PROCEDURE — 99214 OFFICE O/P EST MOD 30 MIN: CPT | Performed by: INTERNAL MEDICINE

## 2019-10-29 PROCEDURE — 1036F TOBACCO NON-USER: CPT | Performed by: INTERNAL MEDICINE

## 2019-10-29 PROCEDURE — 3017F COLORECTAL CA SCREEN DOC REV: CPT | Performed by: INTERNAL MEDICINE

## 2019-10-29 PROCEDURE — G8484 FLU IMMUNIZE NO ADMIN: HCPCS | Performed by: INTERNAL MEDICINE

## 2019-10-29 PROCEDURE — G8428 CUR MEDS NOT DOCUMENT: HCPCS | Performed by: INTERNAL MEDICINE

## 2019-10-29 PROCEDURE — G8417 CALC BMI ABV UP PARAM F/U: HCPCS | Performed by: INTERNAL MEDICINE

## 2020-01-30 ENCOUNTER — OFFICE VISIT (OUTPATIENT)
Dept: CARDIOLOGY CLINIC | Age: 60
End: 2020-01-30
Payer: MEDICARE

## 2020-01-30 VITALS
DIASTOLIC BLOOD PRESSURE: 76 MMHG | HEART RATE: 59 BPM | WEIGHT: 271.4 LBS | SYSTOLIC BLOOD PRESSURE: 138 MMHG | BODY MASS INDEX: 45.16 KG/M2

## 2020-01-30 PROCEDURE — 1036F TOBACCO NON-USER: CPT | Performed by: INTERNAL MEDICINE

## 2020-01-30 PROCEDURE — G8484 FLU IMMUNIZE NO ADMIN: HCPCS | Performed by: INTERNAL MEDICINE

## 2020-01-30 PROCEDURE — G8417 CALC BMI ABV UP PARAM F/U: HCPCS | Performed by: INTERNAL MEDICINE

## 2020-01-30 PROCEDURE — 99214 OFFICE O/P EST MOD 30 MIN: CPT | Performed by: INTERNAL MEDICINE

## 2020-01-30 PROCEDURE — G8427 DOCREV CUR MEDS BY ELIG CLIN: HCPCS | Performed by: INTERNAL MEDICINE

## 2020-01-30 PROCEDURE — 3017F COLORECTAL CA SCREEN DOC REV: CPT | Performed by: INTERNAL MEDICINE

## 2020-02-19 ENCOUNTER — APPOINTMENT (OUTPATIENT)
Dept: GENERAL RADIOLOGY | Age: 60
DRG: 291 | End: 2020-02-19
Payer: MEDICARE

## 2020-02-19 ENCOUNTER — HOSPITAL ENCOUNTER (INPATIENT)
Age: 60
LOS: 6 days | Discharge: HOME | DRG: 291 | End: 2020-02-25
Attending: EMERGENCY MEDICINE | Admitting: INTERNAL MEDICINE
Payer: MEDICARE

## 2020-02-19 PROBLEM — R77.8 ELEVATED TROPONIN: Status: ACTIVE | Noted: 2020-02-19

## 2020-02-19 PROBLEM — I50.1 PULMONARY EDEMA, ACUTE, WITH CONGESTIVE HEART DISEASE (HCC): Status: ACTIVE | Noted: 2020-02-19

## 2020-02-19 PROBLEM — I50.33 ACUTE ON CHRONIC DIASTOLIC CONGESTIVE HEART FAILURE (HCC): Status: ACTIVE | Noted: 2019-04-04

## 2020-02-19 PROBLEM — I10 ESSENTIAL HYPERTENSION: Status: ACTIVE | Noted: 2020-02-19

## 2020-02-19 PROBLEM — R79.89 ELEVATED TROPONIN: Status: ACTIVE | Noted: 2020-02-19

## 2020-02-19 LAB
ANION GAP SERPL CALCULATED.3IONS-SCNC: 10 MMOL/L (ref 3–16)
BASE EXCESS VENOUS: -5.3 MMOL/L (ref -2–3)
BASOPHILS ABSOLUTE: 0.1 K/UL (ref 0–0.2)
BASOPHILS RELATIVE PERCENT: 0.4 %
BUN BLDV-MCNC: 46 MG/DL (ref 7–20)
CALCIUM SERPL-MCNC: 8.8 MG/DL (ref 8.3–10.6)
CARBOXYHEMOGLOBIN: 1.8 % (ref 0–1.5)
CHLORIDE BLD-SCNC: 110 MMOL/L (ref 99–110)
CO2: 22 MMOL/L (ref 21–32)
CREAT SERPL-MCNC: 2.1 MG/DL (ref 0.6–1.2)
EKG ATRIAL RATE: 78 BPM
EKG DIAGNOSIS: NORMAL
EKG P AXIS: 42 DEGREES
EKG P-R INTERVAL: 176 MS
EKG Q-T INTERVAL: 400 MS
EKG QRS DURATION: 96 MS
EKG QTC CALCULATION (BAZETT): 456 MS
EKG R AXIS: 72 DEGREES
EKG T AXIS: 60 DEGREES
EKG VENTRICULAR RATE: 78 BPM
EOSINOPHILS ABSOLUTE: 0.2 K/UL (ref 0–0.6)
EOSINOPHILS RELATIVE PERCENT: 1.3 %
GFR AFRICAN AMERICAN: 29
GFR NON-AFRICAN AMERICAN: 24
GLUCOSE BLD-MCNC: 187 MG/DL (ref 70–99)
GLUCOSE BLD-MCNC: 93 MG/DL (ref 70–99)
HCO3 VENOUS: 22.4 MMOL/L (ref 24–28)
HCT VFR BLD CALC: 26 % (ref 36–48)
HEMOGLOBIN, VEN, REDUCED: 42.8 %
HEMOGLOBIN: 8.1 G/DL (ref 12–16)
IRON SATURATION: 11 % (ref 15–50)
IRON: 26 UG/DL (ref 37–145)
LACTIC ACID: 0.7 MMOL/L (ref 0.4–2)
LYMPHOCYTES ABSOLUTE: 1.1 K/UL (ref 1–5.1)
LYMPHOCYTES RELATIVE PERCENT: 7.6 %
MAGNESIUM: 2.4 MG/DL (ref 1.8–2.4)
MCH RBC QN AUTO: 28 PG (ref 26–34)
MCHC RBC AUTO-ENTMCNC: 31.3 G/DL (ref 31–36)
MCV RBC AUTO: 89.7 FL (ref 80–100)
METHEMOGLOBIN VENOUS: 1 % (ref 0–1.5)
MONOCYTES ABSOLUTE: 0.9 K/UL (ref 0–1.3)
MONOCYTES RELATIVE PERCENT: 6.2 %
NEUTROPHILS ABSOLUTE: 12.1 K/UL (ref 1.7–7.7)
NEUTROPHILS RELATIVE PERCENT: 84.5 %
O2 SAT, VEN: 56 %
PCO2, VEN: 60.2 MMHG (ref 41–51)
PDW BLD-RTO: 18 % (ref 12.4–15.4)
PERFORMED ON: NORMAL
PH VENOUS: 7.2 (ref 7.35–7.45)
PLATELET # BLD: 222 K/UL (ref 135–450)
PMV BLD AUTO: 7.6 FL (ref 5–10.5)
PO2, VEN: 36 MMHG (ref 25–40)
POTASSIUM SERPL-SCNC: 5.5 MMOL/L (ref 3.5–5.1)
PRO-BNP: 1406 PG/ML (ref 0–124)
RAPID INFLUENZA  B AGN: NEGATIVE
RAPID INFLUENZA A AGN: NEGATIVE
RBC # BLD: 2.89 M/UL (ref 4–5.2)
SODIUM BLD-SCNC: 142 MMOL/L (ref 136–145)
TCO2 CALC VENOUS: 24 MMOL/L
TOTAL IRON BINDING CAPACITY: 242 UG/DL (ref 260–445)
TROPONIN: 0.03 NG/ML
TROPONIN: 0.07 NG/ML
TSH REFLEX: 1.02 UIU/ML (ref 0.27–4.2)
WBC # BLD: 14.3 K/UL (ref 4–11)

## 2020-02-19 PROCEDURE — 94664 DEMO&/EVAL PT USE INHALER: CPT

## 2020-02-19 PROCEDURE — 2060000000 HC ICU INTERMEDIATE R&B

## 2020-02-19 PROCEDURE — 87804 INFLUENZA ASSAY W/OPTIC: CPT

## 2020-02-19 PROCEDURE — 96374 THER/PROPH/DIAG INJ IV PUSH: CPT

## 2020-02-19 PROCEDURE — 83735 ASSAY OF MAGNESIUM: CPT

## 2020-02-19 PROCEDURE — 82803 BLOOD GASES ANY COMBINATION: CPT

## 2020-02-19 PROCEDURE — 80048 BASIC METABOLIC PNL TOTAL CA: CPT

## 2020-02-19 PROCEDURE — 99285 EMERGENCY DEPT VISIT HI MDM: CPT

## 2020-02-19 PROCEDURE — 6360000002 HC RX W HCPCS: Performed by: EMERGENCY MEDICINE

## 2020-02-19 PROCEDURE — 84443 ASSAY THYROID STIM HORMONE: CPT

## 2020-02-19 PROCEDURE — 93005 ELECTROCARDIOGRAM TRACING: CPT | Performed by: EMERGENCY MEDICINE

## 2020-02-19 PROCEDURE — 94640 AIRWAY INHALATION TREATMENT: CPT

## 2020-02-19 PROCEDURE — 93320 DOPPLER ECHO COMPLETE: CPT

## 2020-02-19 PROCEDURE — 83550 IRON BINDING TEST: CPT

## 2020-02-19 PROCEDURE — 94799 UNLISTED PULMONARY SVC/PX: CPT

## 2020-02-19 PROCEDURE — 94150 VITAL CAPACITY TEST: CPT

## 2020-02-19 PROCEDURE — 2700000000 HC OXYGEN THERAPY PER DAY

## 2020-02-19 PROCEDURE — 2580000003 HC RX 258: Performed by: STUDENT IN AN ORGANIZED HEALTH CARE EDUCATION/TRAINING PROGRAM

## 2020-02-19 PROCEDURE — 99222 1ST HOSP IP/OBS MODERATE 55: CPT | Performed by: INTERNAL MEDICINE

## 2020-02-19 PROCEDURE — 83540 ASSAY OF IRON: CPT

## 2020-02-19 PROCEDURE — 85025 COMPLETE CBC W/AUTO DIFF WBC: CPT

## 2020-02-19 PROCEDURE — 6370000000 HC RX 637 (ALT 250 FOR IP): Performed by: STUDENT IN AN ORGANIZED HEALTH CARE EDUCATION/TRAINING PROGRAM

## 2020-02-19 PROCEDURE — 36415 COLL VENOUS BLD VENIPUNCTURE: CPT

## 2020-02-19 PROCEDURE — 94761 N-INVAS EAR/PLS OXIMETRY MLT: CPT

## 2020-02-19 PROCEDURE — 93308 TTE F-UP OR LMTD: CPT

## 2020-02-19 PROCEDURE — 84484 ASSAY OF TROPONIN QUANT: CPT

## 2020-02-19 PROCEDURE — 93325 DOPPLER ECHO COLOR FLOW MAPG: CPT

## 2020-02-19 PROCEDURE — 6360000002 HC RX W HCPCS: Performed by: STUDENT IN AN ORGANIZED HEALTH CARE EDUCATION/TRAINING PROGRAM

## 2020-02-19 PROCEDURE — 6370000000 HC RX 637 (ALT 250 FOR IP): Performed by: EMERGENCY MEDICINE

## 2020-02-19 PROCEDURE — 83880 ASSAY OF NATRIURETIC PEPTIDE: CPT

## 2020-02-19 PROCEDURE — 83605 ASSAY OF LACTIC ACID: CPT

## 2020-02-19 PROCEDURE — 71045 X-RAY EXAM CHEST 1 VIEW: CPT

## 2020-02-19 PROCEDURE — 94660 CPAP INITIATION&MGMT: CPT

## 2020-02-19 RX ORDER — ATORVASTATIN CALCIUM 40 MG/1
40 TABLET, FILM COATED ORAL DAILY
Status: DISCONTINUED | OUTPATIENT
Start: 2020-02-19 | End: 2020-02-25 | Stop reason: HOSPADM

## 2020-02-19 RX ORDER — INSULIN LISPRO 100 [IU]/ML
0-6 INJECTION, SOLUTION INTRAVENOUS; SUBCUTANEOUS NIGHTLY
Status: DISCONTINUED | OUTPATIENT
Start: 2020-02-19 | End: 2020-02-25 | Stop reason: HOSPADM

## 2020-02-19 RX ORDER — FUROSEMIDE 10 MG/ML
40 INJECTION INTRAMUSCULAR; INTRAVENOUS ONCE
Status: COMPLETED | OUTPATIENT
Start: 2020-02-19 | End: 2020-02-19

## 2020-02-19 RX ORDER — ONDANSETRON 2 MG/ML
4 INJECTION INTRAMUSCULAR; INTRAVENOUS EVERY 6 HOURS PRN
Status: DISCONTINUED | OUTPATIENT
Start: 2020-02-19 | End: 2020-02-25 | Stop reason: HOSPADM

## 2020-02-19 RX ORDER — POLYETHYLENE GLYCOL 3350 17 G/17G
17 POWDER, FOR SOLUTION ORAL NIGHTLY PRN
Status: ON HOLD | COMMUNITY
End: 2020-02-25 | Stop reason: HOSPADM

## 2020-02-19 RX ORDER — GABAPENTIN 400 MG/1
800 CAPSULE ORAL 3 TIMES DAILY
Status: DISCONTINUED | OUTPATIENT
Start: 2020-02-19 | End: 2020-02-25 | Stop reason: HOSPADM

## 2020-02-19 RX ORDER — FUROSEMIDE 10 MG/ML
40 INJECTION INTRAMUSCULAR; INTRAVENOUS 2 TIMES DAILY
Status: DISCONTINUED | OUTPATIENT
Start: 2020-02-19 | End: 2020-02-21

## 2020-02-19 RX ORDER — POLYETHYLENE GLYCOL 3350 17 G/17G
17 POWDER, FOR SOLUTION ORAL DAILY
COMMUNITY

## 2020-02-19 RX ORDER — SODIUM CHLORIDE 0.9 % (FLUSH) 0.9 %
10 SYRINGE (ML) INJECTION PRN
Status: DISCONTINUED | OUTPATIENT
Start: 2020-02-19 | End: 2020-02-25 | Stop reason: HOSPADM

## 2020-02-19 RX ORDER — FAMOTIDINE 20 MG/1
20 TABLET, FILM COATED ORAL DAILY
Status: DISCONTINUED | OUTPATIENT
Start: 2020-02-19 | End: 2020-02-20

## 2020-02-19 RX ORDER — AMLODIPINE BESYLATE 10 MG/1
10 TABLET ORAL DAILY
Status: DISCONTINUED | OUTPATIENT
Start: 2020-02-19 | End: 2020-02-20

## 2020-02-19 RX ORDER — AMIODARONE HYDROCHLORIDE 200 MG/1
200 TABLET ORAL DAILY
Status: DISCONTINUED | OUTPATIENT
Start: 2020-02-19 | End: 2020-02-25 | Stop reason: HOSPADM

## 2020-02-19 RX ORDER — ACETAMINOPHEN 650 MG/1
650 SUPPOSITORY RECTAL EVERY 6 HOURS PRN
Status: DISCONTINUED | OUTPATIENT
Start: 2020-02-19 | End: 2020-02-25 | Stop reason: HOSPADM

## 2020-02-19 RX ORDER — IPRATROPIUM BROMIDE AND ALBUTEROL SULFATE 2.5; .5 MG/3ML; MG/3ML
1 SOLUTION RESPIRATORY (INHALATION)
Status: DISCONTINUED | OUTPATIENT
Start: 2020-02-19 | End: 2020-02-23

## 2020-02-19 RX ORDER — IPRATROPIUM BROMIDE AND ALBUTEROL SULFATE 2.5; .5 MG/3ML; MG/3ML
1 SOLUTION RESPIRATORY (INHALATION) ONCE
Status: COMPLETED | OUTPATIENT
Start: 2020-02-19 | End: 2020-02-19

## 2020-02-19 RX ORDER — CARVEDILOL 12.5 MG/1
12.5 TABLET ORAL 2 TIMES DAILY WITH MEALS
Status: DISCONTINUED | OUTPATIENT
Start: 2020-02-19 | End: 2020-02-25 | Stop reason: HOSPADM

## 2020-02-19 RX ORDER — ACETAMINOPHEN 325 MG/1
650 TABLET ORAL EVERY 6 HOURS PRN
Status: DISCONTINUED | OUTPATIENT
Start: 2020-02-19 | End: 2020-02-25 | Stop reason: HOSPADM

## 2020-02-19 RX ORDER — FERROUS GLUCONATE 324(37.5)
324 TABLET ORAL 2 TIMES DAILY WITH MEALS
Status: DISCONTINUED | OUTPATIENT
Start: 2020-02-19 | End: 2020-02-25 | Stop reason: HOSPADM

## 2020-02-19 RX ORDER — HYDROCODONE BITARTRATE AND ACETAMINOPHEN 7.5; 325 MG/1; MG/1
1 TABLET ORAL EVERY 6 HOURS PRN
Status: DISCONTINUED | OUTPATIENT
Start: 2020-02-19 | End: 2020-02-25 | Stop reason: HOSPADM

## 2020-02-19 RX ORDER — SODIUM CHLORIDE 0.9 % (FLUSH) 0.9 %
10 SYRINGE (ML) INJECTION EVERY 12 HOURS SCHEDULED
Status: DISCONTINUED | OUTPATIENT
Start: 2020-02-19 | End: 2020-02-25 | Stop reason: HOSPADM

## 2020-02-19 RX ORDER — HEPARIN SODIUM 5000 [USP'U]/ML
5000 INJECTION, SOLUTION INTRAVENOUS; SUBCUTANEOUS EVERY 8 HOURS SCHEDULED
Status: DISCONTINUED | OUTPATIENT
Start: 2020-02-19 | End: 2020-02-25 | Stop reason: HOSPADM

## 2020-02-19 RX ORDER — INSULIN LISPRO 100 [IU]/ML
0-12 INJECTION, SOLUTION INTRAVENOUS; SUBCUTANEOUS
Status: DISCONTINUED | OUTPATIENT
Start: 2020-02-19 | End: 2020-02-25 | Stop reason: HOSPADM

## 2020-02-19 RX ORDER — PROMETHAZINE HYDROCHLORIDE 25 MG/1
12.5 TABLET ORAL EVERY 6 HOURS PRN
Status: DISCONTINUED | OUTPATIENT
Start: 2020-02-19 | End: 2020-02-25 | Stop reason: HOSPADM

## 2020-02-19 RX ORDER — POLYETHYLENE GLYCOL 3350 17 G/17G
17 POWDER, FOR SOLUTION ORAL DAILY PRN
Status: DISCONTINUED | OUTPATIENT
Start: 2020-02-19 | End: 2020-02-25 | Stop reason: HOSPADM

## 2020-02-19 RX ADMIN — FERROUS GLUCONATE TAB 324 MG (37.5 MG ELEMENTAL IRON) 324 MG: 324 (37.5 FE) TAB at 12:28

## 2020-02-19 RX ADMIN — IPRATROPIUM BROMIDE AND ALBUTEROL SULFATE 1 AMPULE: .5; 3 SOLUTION RESPIRATORY (INHALATION) at 22:06

## 2020-02-19 RX ADMIN — HYDROCODONE BITARTRATE AND ACETAMINOPHEN 1 TABLET: 7.5; 325 TABLET ORAL at 12:28

## 2020-02-19 RX ADMIN — GABAPENTIN 800 MG: 400 CAPSULE ORAL at 12:29

## 2020-02-19 RX ADMIN — FAMOTIDINE 20 MG: 20 TABLET, FILM COATED ORAL at 12:29

## 2020-02-19 RX ADMIN — HYDROCODONE BITARTRATE AND ACETAMINOPHEN 1 TABLET: 7.5; 325 TABLET ORAL at 18:34

## 2020-02-19 RX ADMIN — AMLODIPINE BESYLATE 10 MG: 10 TABLET ORAL at 12:29

## 2020-02-19 RX ADMIN — INSULIN LISPRO 3 UNITS: 100 INJECTION, SOLUTION INTRAVENOUS; SUBCUTANEOUS at 12:32

## 2020-02-19 RX ADMIN — Medication 10 ML: at 12:31

## 2020-02-19 RX ADMIN — IPRATROPIUM BROMIDE AND ALBUTEROL SULFATE 1 AMPULE: .5; 3 SOLUTION RESPIRATORY (INHALATION) at 06:21

## 2020-02-19 RX ADMIN — GABAPENTIN 800 MG: 400 CAPSULE ORAL at 22:50

## 2020-02-19 RX ADMIN — FERROUS GLUCONATE TAB 324 MG (37.5 MG ELEMENTAL IRON) 324 MG: 324 (37.5 FE) TAB at 17:50

## 2020-02-19 RX ADMIN — GABAPENTIN 800 MG: 400 CAPSULE ORAL at 17:50

## 2020-02-19 RX ADMIN — IPRATROPIUM BROMIDE AND ALBUTEROL SULFATE 1 AMPULE: .5; 3 SOLUTION RESPIRATORY (INHALATION) at 17:35

## 2020-02-19 RX ADMIN — Medication 10 ML: at 22:51

## 2020-02-19 RX ADMIN — IPRATROPIUM BROMIDE AND ALBUTEROL SULFATE 1 AMPULE: .5; 3 SOLUTION RESPIRATORY (INHALATION) at 11:56

## 2020-02-19 RX ADMIN — INSULIN GLARGINE 30 UNITS: 100 INJECTION, SOLUTION SUBCUTANEOUS at 22:49

## 2020-02-19 RX ADMIN — INSULIN LISPRO 2 UNITS: 100 INJECTION, SOLUTION INTRAVENOUS; SUBCUTANEOUS at 18:35

## 2020-02-19 RX ADMIN — FUROSEMIDE 40 MG: 10 INJECTION, SOLUTION INTRAMUSCULAR; INTRAVENOUS at 17:50

## 2020-02-19 RX ADMIN — FUROSEMIDE 40 MG: 10 INJECTION, SOLUTION INTRAMUSCULAR; INTRAVENOUS at 08:08

## 2020-02-19 RX ADMIN — Medication 5000 UNITS: at 12:29

## 2020-02-19 RX ADMIN — ATORVASTATIN CALCIUM 40 MG: 40 TABLET, FILM COATED ORAL at 12:29

## 2020-02-19 ASSESSMENT — PAIN DESCRIPTION - FREQUENCY
FREQUENCY: INTERMITTENT
FREQUENCY: CONTINUOUS

## 2020-02-19 ASSESSMENT — PAIN DESCRIPTION - PAIN TYPE
TYPE: CHRONIC PAIN

## 2020-02-19 ASSESSMENT — PAIN DESCRIPTION - LOCATION
LOCATION: GENERALIZED
LOCATION: GENERALIZED

## 2020-02-19 ASSESSMENT — PAIN SCALES - GENERAL
PAINLEVEL_OUTOF10: 9
PAINLEVEL_OUTOF10: 5
PAINLEVEL_OUTOF10: 7
PAINLEVEL_OUTOF10: 5
PAINLEVEL_OUTOF10: 6

## 2020-02-19 ASSESSMENT — PAIN DESCRIPTION - ONSET
ONSET: ON-GOING
ONSET: ON-GOING

## 2020-02-19 ASSESSMENT — PAIN DESCRIPTION - DESCRIPTORS: DESCRIPTORS: ACHING;DISCOMFORT

## 2020-02-19 ASSESSMENT — PAIN DESCRIPTION - PROGRESSION: CLINICAL_PROGRESSION: GRADUALLY WORSENING

## 2020-02-19 NOTE — ED PROVIDER NOTES
(NORVASC) 10 MG TABLET    Take 1 tablet by mouth daily    ATORVASTATIN (LIPITOR) 40 MG TABLET    Take 40 mg by mouth daily    CARVEDILOL (COREG) 12.5 MG TABLET    Take 12.5 mg by mouth 2 times daily (with meals)    FAMOTIDINE (PEPCID) 40 MG TABLET    Take 1 tablet by mouth 2 times daily    FERROUS GLUCONATE (FERGON) 324 (38 FE) MG TABLET    Take 324 mg by mouth 2 times daily (with meals)    GABAPENTIN (NEURONTIN) 800 MG TABLET    Take 800 mg by mouth 3 times daily    HYDROCODONE-ACETAMINOPHEN (NORCO) 7.5-325 MG PER TABLET    Take 1 tablet by mouth every 6 hours as needed for Pain . INSULIN GLARGINE (LANTUS SOLOSTAR) 100 UNIT/ML INJECTION PEN    Inject 68 Units into the skin nightly    IPRATROPIUM-ALBUTEROL (DUONEB) 0.5-2.5 (3) MG/3ML SOLN NEBULIZER SOLUTION    Inhale 3 mLs into the lungs every 4 hours (while awake)    MISC. DEVICES (ROLLER WALKER) MISC    1 each by Does not apply route daily       Allergies     She is allergic to dicumarol and warfarin and related. Physical Exam     INITIAL VITALS: BP: (!) 168/109,  , Pulse: 78, Resp: 25, SpO2: 100 %   Physical Exam  Vitals signs and nursing note reviewed. Constitutional:       General: She is in acute distress. Appearance: She is obese. HENT:      Head: Normocephalic and atraumatic. Mouth/Throat:      Mouth: Mucous membranes are moist.      Pharynx: No oropharyngeal exudate. Eyes:      General: No scleral icterus. Extraocular Movements: Extraocular movements intact. Conjunctiva/sclera: Conjunctivae normal.      Pupils: Pupils are equal, round, and reactive to light. Neck:      Musculoskeletal: Normal range of motion and neck supple. Cardiovascular:      Rate and Rhythm: Normal rate and regular rhythm. Heart sounds: Normal heart sounds. Pulmonary:      Effort: Tachypnea and respiratory distress present. Breath sounds: Decreased breath sounds present.       Comments: Patient is tachypneic with diminished breath sounds started on CPAP. On arrival, the patient was noted to be tachypneic with diminished breath sounds bilaterally. She was switched to BiPAP with improvement of her oxygen saturations. EKG shows no acute ischemic abnormalities. Patient was written for a DuoNeb. Chest x-ray and laboratory studies are pending. At this time, care of the patient be turned over to the oncoming provider who complete the patient's work-up and disposition pending results of testing and response to therapy, with likely need for admission to either the medical intensive care unit or the pulmonary care unit. Critical Care:  Due to the immediate potential for life-threatening deterioration due to respiratory distress and hypoxia, I spent 35 minutes providing critical care. Thistime excludes time spent performing procedures but includes time spent on direct patient care, history retrieval, review of the chart, and discussions with patient, family, and consultant(s). Clinical Impression     1. Acute respiratory failure with hypoxia (HCC)        Disposition     PATIENT REFERRED TO:  No follow-up provider specified.     DISCHARGE MEDICATIONS:  New Prescriptions    No medications on file       DISPOSITION          Nathaly Cobb MD  02/19/20 3615

## 2020-02-19 NOTE — H&P
324 (38 Fe) MG tablet, Take 324 mg by mouth 2 times daily (with meals)  · amLODIPine (NORVASC) 10 MG tablet, Take 1 tablet by mouth daily  · HYDROcodone-acetaminophen (NORCO) 7.5-325 MG per tablet, Take 1 tablet by mouth every 4 hours as needed for Pain. · famotidine (PEPCID) 40 MG tablet, Take 1 tablet by mouth 2 times daily  · atorvastatin (LIPITOR) 40 MG tablet, Take 40 mg by mouth nightly   · gabapentin (NEURONTIN) 800 MG tablet, Take 800 mg by mouth 3 times daily    Allergies:  Dicumarol and Warfarin and related    Social History:   · TOBACCO:   reports that she quit smoking about 3 years ago. Her smoking use included cigarettes. She has never used smokeless tobacco.  · ETOH:   reports current alcohol use. · DRUGS:  no use   · Patient currently lives with son  ·   Family History:       Problem Relation Age of Onset    Diabetes Mother     Hypertension Mother     No Known Problems Father     No Known Problems Sister     No Known Problems Brother     No Known Problems Maternal Grandmother     No Known Problems Maternal Grandfather     No Known Problems Paternal Grandmother     No Known Problems Paternal Grandfather     No Known Problems Other    ·       Review of Systems:  A 10 point review of systems was conducted, significant findings as noted in HPI. Physical Exam  Constitutional:       Appearance: Normal appearance. She is obese. HENT:      Head: Normocephalic and atraumatic. Nose: No congestion or rhinorrhea. Mouth/Throat:      Mouth: Mucous membranes are moist.   Eyes:      Extraocular Movements: Extraocular movements intact. Conjunctiva/sclera: Conjunctivae normal.      Pupils: Pupils are equal, round, and reactive to light. Neck:      Musculoskeletal: Normal range of motion and neck supple. Cardiovascular:      Rate and Rhythm: Normal rate. Rhythm irregular. Pulses: Normal pulses. Heart sounds: Normal heart sounds.       Comments: PVCs   Pulmonary:      Effort:

## 2020-02-19 NOTE — PROGRESS NOTES
Patient admitted to room 4452 from [facility] ED. Patient oriented to room, call light, bed rails, phone, lights and bathroom. Patient instructed about the schedule of the day including: vital sign frequency, lab draws, possible tests, frequency of MD and staff rounds, including RN/MD rounding together at bedside, daily weights, and I &O's. Patient instructed about prescribed diet, and television. Bed alarm in place, patient aware of placement and reason. Telemetry box  in place, patient aware of placement and reason. Bed locked, in lowest position, side rails up 2/4, call light within reach. Will continue to monitor.

## 2020-02-19 NOTE — ED PROVIDER NOTES
[facility]          ATTENDING PHYSICIAN NOTE       Date of evaluation: 2/19/2020    ADDENDUM:      Care of this patient was assumed from Dr. Fabio Kwon. The patient was seen for Shortness of Breath  . The patient's initial evaluation and plan have been discussed with the prior provider who initially evaluated the patient. Nursing Notes, Past Medical Hx, Past Surgical Hx, Social Hx, Allergies, and Family Hx were all reviewed. Diagnostic Results     RADIOLOGY:  XR CHEST PORTABLE   Final Result     Pulmonary vascular prominence with perihilar and bibasilar asymmetric    changes are noted. Stable cardiomegaly. Findings are most consistent    with pulmonary edema. No definite focal consolidation. No large pleural    effusion noted in the lateral projection or pneumothorax.               LABS:   Results for orders placed or performed during the hospital encounter of 02/19/20   Rapid Flu Swab   Result Value Ref Range    Rapid Influenza A Ag Negative Negative    Rapid Influenza B Ag Negative Negative   CBC auto differential   Result Value Ref Range    WBC 14.3 (H) 4.0 - 11.0 K/uL    RBC 2.89 (L) 4.00 - 5.20 M/uL    Hemoglobin 8.1 (L) 12.0 - 16.0 g/dL    Hematocrit 26.0 (L) 36.0 - 48.0 %    MCV 89.7 80.0 - 100.0 fL    MCH 28.0 26.0 - 34.0 pg    MCHC 31.3 31.0 - 36.0 g/dL    RDW 18.0 (H) 12.4 - 15.4 %    Platelets 777 102 - 521 K/uL    MPV 7.6 5.0 - 10.5 fL    Neutrophils % 84.5 %    Lymphocytes % 7.6 %    Monocytes % 6.2 %    Eosinophils % 1.3 %    Basophils % 0.4 %    Neutrophils Absolute 12.1 (H) 1.7 - 7.7 K/uL    Lymphocytes Absolute 1.1 1.0 - 5.1 K/uL    Monocytes Absolute 0.9 0.0 - 1.3 K/uL    Eosinophils Absolute 0.2 0.0 - 0.6 K/uL    Basophils Absolute 0.1 0.0 - 0.2 K/uL   Basic Metabolic Panel (EP - 1)   Result Value Ref Range    Sodium 142 136 - 145 mmol/L    Potassium 5.5 (H) 3.5 - 5.1 mmol/L    Chloride 110 99 - 110 mmol/L    CO2 22 21 - 32 mmol/L    Anion Gap 10 3 - 16    Glucose 187 (H) 70 - 99 mg/dL    BUN 46 (H) 7 - 20 mg/dL    CREATININE 2.1 (H) 0.6 - 1.2 mg/dL    GFR Non-African American 24 (A) >60    GFR  29 (A) >60    Calcium 8.8 8.3 - 10.6 mg/dL   Brain Natriuretic Peptide   Result Value Ref Range    Pro-BNP 1,406 (H) 0 - 124 pg/mL   Troponin   Result Value Ref Range    Troponin 0.03 (H) <0.01 ng/mL   Blood gas, venous (Lab)   Result Value Ref Range    pH, Billy 7.196 (LL) 7.350 - 7.450    pCO2, Billy 60.2 (H) 41.0 - 51.0 mmHg    pO2, Billy 36.0 25.0 - 40.0 mmHg    HCO3, Venous 22.4 (L) 24.0 - 28.0 mmol/L    Base Excess, Billy -5.3 (L) -2.0 - 3.0 mmol/L    O2 Sat, Billy 56 Not established %    Carboxyhemoglobin 1.8 (H) 0.0 - 1.5 %    MetHgb, Billy 1.0 0.0 - 1.5 %    TC02 (Calc), Billy 24 mmol/L    Hemoglobin, Billy, Reduced 42.80 %   Lactic Acid, Plasma   Result Value Ref Range    Lactic Acid 0.7 0.4 - 2.0 mmol/L   EKG 12 Lead   Result Value Ref Range    Ventricular Rate 78 BPM    Atrial Rate 78 BPM    P-R Interval 176 ms    QRS Duration 96 ms    Q-T Interval 400 ms    QTc Calculation (Bazett) 456 ms    P Axis 42 degrees    R Axis 72 degrees    T Axis 60 degrees    Diagnosis       EKG performed in ER and to be interpreted by ER physician. Reconfirmed by MD, ER ([id number]),  [place] Notice ([id number]) on 2/19/2020 6:15:11 AM       RECENT VITALS:  BP: (!) 160/50,  , Pulse: 72, Resp: 16, SpO2: 100 %       ED Course     The patient was given the following medications:  Orders Placed This Encounter   Medications    ipratropium-albuterol (DUONEB) nebulizer solution 1 ampule    furosemide (LASIX) injection 40 mg       CONSULTS:  IP CONSULT TO HOSPITALIST  IP CONSULT TO CARDIOLOGY  IP CONSULT TO [address] [place] [address] / [facility] [place] / Dominguez Alice is a 62 y.o. female with a history of diastolic congestive heart failure (EF 55-60% 2016, Dr. Troy Licona) and CKD (Cr 2.1, Dr. Buelah Bence) presenting in respiratory distress on CPAP because of initial oxygen saturation of 70% at home. The patient was placed on BiPAP with improvement. She does not appear to be on Lasix. Given the patient's degree of respiratory distress and response to BiPAP. She will need to be admitted to the hospital but to a higher level of care such as PCU. Findings today are most consistent with pulmonary edema given the chest x-ray and elevated BNP. Her troponin is slightly elevated, the significance of which is not clear at this time, as it may relate to CKD. Clinical Impression     1. Acute respiratory failure with hypoxia ([address]    2.  Congestive heart failure, unspecified HF chronicity, unspecified heart failure type [facility])        Disposition     DISPOSITION  ADMIT PCU       Marquis Sherman MD  02/19/20 [id number]

## 2020-02-19 NOTE — PROGRESS NOTES
+  RESPIRATORY THERAPY ASSESSMENT    Name:  Garry Shahid Record Number:  [id number]  Age: 62 y.o. Gender: female  : 1960  Today's Date:  2020  Room:  [facility]3/8670-10    Assessment     Is the patient being admitted for a COPD or Asthma exacerbation? No   (If yes the patient will be seen every 4 hours for the first 24 hours and then reassessed)    Patient Admission Diagnosis      Allergies  Allergies   Allergen Reactions    Dicumarol      Other    Percocet [Oxycodone-Acetaminophen] Itching    Warfarin And Related Hives     Other         Minimum Predicted Vital Capacity:     850          Actual Vital Capacity:    750-  1000              Pulmonary History:CHF/Pulmonary Edema  Home Oxygen Therapy:  NONE  Home Respiratory Therapy:None   Current Respiratory Therapy:  duoneb Q4w/a  Treatment Type: Aerosol generator  Medications: Albuterol/Ipratropium    Respiratory Severity Index(RSI)   Patients with orders for inhalation medications, oxygen, or any therapeutic treatment modality will be placed on Respiratory Protocol. They will be assessed with the first treatment and at least every 72 hours thereafter. The following severity scale will be used to determine frequency of treatment intervention.     Smoking History: Pulmonary Disease or Smoking History, Greater than 15 pack year = 2    Social History  Social History     Tobacco Use    Smoking status: Former Smoker     Types: Cigarettes     Last attempt to quit: 2016     Years since quitting: 3.1    Smokeless tobacco: Never Used   Substance Use Topics    Alcohol use: Yes     Comment: about 1 drink weekly    Drug use: No       Recent Surgical History: None = 0  Past Surgical History  Past Surgical History:   Procedure Laterality Date    COLONOSCOPY      ENDOSCOPY, COLON, DIAGNOSTIC      TUBAL LIGATION         Level of Consciousness: Alert, Oriented, and Cooperative = 0    Level of Activity: Walking with assistance = 1    Respiratory examination or by history for at least 24 hours, contact physician for possible discontinuation.

## 2020-02-19 NOTE — CONSULTS
Nephrology Consult Note                                                                                                                                                                                                                                                                                                                                                               Office : [phone]     Fax :[phone]              Patient's Name: Margarita Perry  10:23 AM  2/19/2020    Reason for Consult:  CKD  Requesting Physician:  Jaqueline Herrera MD      Chief Complaint: SOB    History of Present Ilness:    Margarita Perry is a 62 y.o. female with PMH of CKD, CHF, DM and HTN who presents with a one-day history of SOB. Pt states that she woke up yesterday morning feeling some SOB and decided to leave the house to go to [facility], where she had a large meal. Her SOB became progressively worse after this, prompting her to come in today. She was initially placed on BiPAP, and is now on 4L O2 by NC. She continues to feel SOB, however she does feel improved since admission. Pt is known to Dr. Bakari Mendoza, last seen in office on 01/30. No acute issues at the time were noted other than slightly high K to 4.9, low K diet was discussed with patient. Pt has baseline Cr of approximately 2.0, currently 2.1. She was hypertensive on presentation to the 160s, now normotensive. Pt is compliant with her medications. CXR done in ED showed pulmonary edema.      Past Medical History:   Diagnosis Date    CHF (congestive heart failure) (HCC)     Chronic kidney disease     Diabetes mellitus ([facility] [address]     Hyperlipidemia     Hypertension     MDRO (multiple drug resistant organisms) resistance 6/3/2016    E Coli-urine       Past Surgical History:   Procedure Laterality Date    COLONOSCOPY      ENDOSCOPY, COLON, DIAGNOSTIC      TUBAL LIGATION         Family History   Problem Relation Age of Onset    Diabetes Mother     Hypertension Mother    Abdalla Plunk Ht 5' 5\" (1.651 m)   Wt 275 lb (124.7 kg)   SpO2 92%   BMI 45.76 kg/m²   Constitutional:  OAA X3 NAD  Skin: no rash, turgor wnl  Heent:  eomi, mmm  Neck: no bruits or jvd noted  Cardiovascular:  S1, S2 without m/r/g  Respiratory: Crackles at lung bases bilaterally   Abdomen: soft, nt, nd  Ext: no lower extremity edema  Psychiatric: mood and affect appropriate  Musculoskeletal:  Rom, muscular strength intact    Data:   Labs:  CBC:   Recent Labs     02/19/20  0631   WBC 14.3*   HGB 8.1*        BMP:    Recent Labs     02/19/20  0631      K 5.5*      CO2 22   BUN 46*   CREATININE 2.1*   GLUCOSE 187*     Ca/Mg/Phos:   Recent Labs     02/19/20  0631   CALCIUM 8.8     Hepatic: No results for input(s): AST, ALT, ALB, BILITOT, ALKPHOS in the last 72 hours. Troponin:   Recent Labs     02/19/20 0631   TROPONINI 0.03*     BNP: No results for input(s): BNP in the last 72 hours. Lipids: No results for input(s): CHOL, TRIG, HDL, LDLCALC, LABVLDL in the last 72 hours. ABGs: No results for input(s): PHART, PO2ART, BJQ2LOI in the last 72 hours. INR: No results for input(s): INR in the last 72 hours. UA:No results for input(s): Cassandra Quiver, GLUCOSEU, BILIRUBINUR, Wolm Juniper, BLOODU, PHUR, PROTEINU, UROBILINOGEN, NITRU, LEUKOCYTESUR, LABMICR, URINETYPE in the last 72 hours. Urine Microscopic: No results for input(s): LABCAST, BACTERIA, COMU, HYALCAST, WBCUA, RBCUA, EPIU in the last 72 hours. Urine Culture: No results for input(s): LABURIN in the last 72 hours. Urine Chemistry: No results for input(s): Abad Bile, PROTEINUR, NAUR in the last 72 hours. IMAGING:  XR CHEST PORTABLE   Final Result     Pulmonary vascular prominence with perihilar and bibasilar asymmetric    changes are noted. Stable cardiomegaly. Findings are most consistent    with pulmonary edema. No definite focal consolidation. No large pleural    effusion noted in the lateral projection or pneumothorax. Assessment   1. CKD  - Cr 2.1, baseline of 2.0     2. Hyperkalemia   - K of 5.5 on admission, pt seen in office on 01/30 when K was 4.9     3. CHF  - Pt presenting with 2 day history of SOB  - pro-BNP of 1406 on admission, CXR showed pulmonary edema   - Trop of 0.03   - On 40 IV Lasix BID, no Lasix use at home     4. HTN  - Pt hypertensive to the 160s SBP on admission, now normotensive   - Currently on Norvasc 10mg daily, Coreg 12.5mg BID     5. DM  - BG in 180s on admission   - On sliding scale insulin now     6.  Anemia   - Hgb of 8.1, baseline appears to be around 9.0     Plan  - Continue anti-hypertensives, will adjust medications as necessary   - Continue IV Lasix given pulmonary edema, pt should tolerate from a renal standpoint   - Low K diet   - monitor Lytes   - Monitor Uo and renal function           Thank you for allowing us to participate in care of Eros Maldonado, MS4 acted as the Jose Daniel Rosa MD

## 2020-02-19 NOTE — PROGRESS NOTES
4 Eyes Admission Assessment     I agree as the admission nurse that 2 RN's have performed a thorough Head to Toe Skin Assessment on the patient. ALL assessment sites listed below have been assessed on admission. Areas assessed by both nurses: ***  [x]   Head, Face, and Ears   [x]   Shoulders, Back, and Chest  [x]   Arms, Elbows, and Hands   [x]   Coccyx, Sacrum, and Ischum  [x]   Legs, Feet, and Heels        Does the Patient have Skin Breakdown?   No         Luis Fernando Prevention initiated:  NA   Wound Care Orders initiated:  NA      [facility] nurse consulted for Pressure Injury (Stage 3,4, Unstageable, DTI, NWPT, and Complex wounds):  No      Nurse 1 eSignature: Electronically signed by Orly Gu RN on 2/19/20 at 10:02 AM    **SHARE this note so that the co-signing nurse is able to place an eSignature**    Nurse 2 eSignature: {Esignature:[id number]}

## 2020-02-19 NOTE — CONSULTS
Nutrition Education    Type and Reason for Visit: Consult, Patient Education(CHF edu)    Nutrition Assessment:     Pt seen per [facility] for CHF diet education. Provided pt with written and verbal instruction on HF nutrition therapy. Discussed low sodium diet, daily weights, and fluid restriction. Expected non-compliance as pt reports that she does not follow any diets at home and preceded to laugh during diet education. Reinforcement needed. Time spent: 10 minutes    · Verbally reviewed information with Patient  · Written educational materials provided. · Contact name and number provided. · Refer to Patient Education activity for more details.     Electronically signed by Rosario Sherman RD, LD on 2/19/20 at 2:13 PM    Contact Number: [phone]

## 2020-02-19 NOTE — ED NOTES
Pt stated she was feeling better and wanted to take of the bipap. Resident at bedside and is aware. Pt placed on 4L/nc. Pt speaking with residents. Call light in reach will continue to monitor.       Nadege Mart RN  02/19/20 5931

## 2020-02-19 NOTE — H&P
Internal Medicine  PGY- 2  History & Physical      CC:  Shortness of breath    History of Present Illness:   Grey Reece is a 62 y.o. female, with a history of congestive heart failure, GERD, chronic kidney disease and diabetes mellitus who presented to the ED with a two-day history of SOB. She reports that yesterday she went to [facility] where she had a heavy meal, including lots of sausage and olvera. She began to feel SOB after than and this continued into the night, to the point where she could not get off of the toilet seat for 30 minutes. The symptoms persisted into this morning, so she was brought into the ED. She was satting in the 80s on arrival so was placed on BIPAP, which brought her Sa02 into the 90s. Her breathing improved and after 30 minutes she was taken off BIPAP and placed on nasal cannula with 4L of O2. She reports a productive cough with clear sputum, fatigue, several pound recent weight gain, leg swelling and fullness. She denies fever, chills, paresthesias, head ache and chest pain.      Past Medical History:        Diagnosis Date    CHF (congestive heart failure) (HCC)     Chronic kidney disease     Diabetes mellitus ([facility] [address]     Hyperlipidemia     Hypertension     MDRO (multiple drug resistant organisms) resistance 6/3/2016    E Coli-urine       Past Surgical History:        Procedure Laterality Date    COLONOSCOPY      ENDOSCOPY, COLON, DIAGNOSTIC      TUBAL LIGATION         Medications Prior to Admission:    Medications Prior to Admission: insulin glargine (LANTUS SOLOSTAR) 100 UNIT/ML injection pen, Inject 60 Units into the skin nightly  polyethylene glycol (GLYCOLAX) packet, Take 17 g by mouth daily  polyethylene glycol (GLYCOLAX) packet, Take 17 g by mouth nightly as needed for Constipation  amiodarone (CORDARONE) 200 MG tablet, TAKE 1 TABLET BY MOUTH DAILY  carvedilol (COREG) 12.5 MG tablet, Take 12.5 mg by mouth 2 times daily (with meals)  ferrous gluconate (FERGON) 324 (38 Fe) MG Lori Harris is a 62 y.o. female,       Acute hypoxic, hypercapnic respiratory failure 2/2 to diastolic CHF exacerbation  -- In setting of pickwickian syndrome   -- Furosemide injection 40 mg BID  -- Echo (last echo done in 2016, EF 55-60%); Consult Cardiology  -- Daily Weights, strict I&O   -- trend troponin although most likely non ischemic etiology of tropenemia   -- Heart failure nurse     History of smoking with possible undiagnosed COPD & SHAQ & OHS   - Wheezing on lung exam   - No PFT on file   - Sleep consult as outpatient   - Pulmonary consult as out patient   - Duonebs   - Keep saturation greater than 90%     Anemia Chronic mixed iron deficiency and chronic disease   Ferrous sulfate 325     Hypertension  -- Norvasc 10 mg PO qd on hold for diuresis   -- Coreg 12.5 mg BID with meals on hold for diuresis     Afib  -- Amiodarone 200 mg PO qd on hold     GERD  -- Famotidine PO 20 mg qd    DMII   --  Insulin sliding scale   --  Insulin Glargine 60 Units s/c qn     HLD  -- Lipitor PO 40 mg qd  -- Lipid panel    Code Status:  Full Code  FEN: Renal and cardiac   PPX:  SCDs,   DISPO:  IP    ------------------------------------  Cuco Wyatt MD, PGY- 2  02/19/20  1:23 PM     Patient seen and examined, labs and imaging studies reviewed, agree with assessment and plan as outlined above. Continue with care late entry on 2/20, seen and examined on 2/19, has family history of chf, discussed sleep study at length as outpatient, greater than 35 minutes spent on case over half face to face.      MD Haseeb Morales

## 2020-02-20 PROBLEM — I42.0 DILATED CARDIOMYOPATHY (HCC): Status: ACTIVE | Noted: 2020-02-20

## 2020-02-20 LAB
ANION GAP SERPL CALCULATED.3IONS-SCNC: 16 MMOL/L (ref 3–16)
BACTERIA: ABNORMAL /HPF
BASOPHILS ABSOLUTE: 0.1 K/UL (ref 0–0.2)
BASOPHILS RELATIVE PERCENT: 0.6 %
BILIRUBIN URINE: NEGATIVE
BLOOD, URINE: ABNORMAL
BUN BLDV-MCNC: 38 MG/DL (ref 7–20)
C-REACTIVE PROTEIN: 176.5 MG/L (ref 0–5.1)
CALCIUM SERPL-MCNC: 9.1 MG/DL (ref 8.3–10.6)
CHLORIDE BLD-SCNC: 106 MMOL/L (ref 99–110)
CHOLESTEROL, TOTAL: 175 MG/DL (ref 0–199)
CLARITY: CLEAR
CO2: 20 MMOL/L (ref 21–32)
COLOR: YELLOW
CREAT SERPL-MCNC: 2.1 MG/DL (ref 0.6–1.2)
EOSINOPHILS ABSOLUTE: 0 K/UL (ref 0–0.6)
EOSINOPHILS RELATIVE PERCENT: 0.3 %
EPITHELIAL CELLS, UA: ABNORMAL /HPF (ref 0–5)
GFR AFRICAN AMERICAN: 29
GFR NON-AFRICAN AMERICAN: 24
GLUCOSE BLD-MCNC: 100 MG/DL (ref 70–99)
GLUCOSE BLD-MCNC: 212 MG/DL (ref 70–99)
GLUCOSE BLD-MCNC: 216 MG/DL (ref 70–99)
GLUCOSE BLD-MCNC: 73 MG/DL (ref 70–99)
GLUCOSE BLD-MCNC: 95 MG/DL (ref 70–99)
GLUCOSE BLD-MCNC: 95 MG/DL (ref 70–99)
GLUCOSE BLD-MCNC: >600 MG/DL (ref 70–99)
GLUCOSE URINE: NEGATIVE MG/DL
HCT VFR BLD CALC: 22.9 % (ref 36–48)
HDLC SERPL-MCNC: 112 MG/DL (ref 40–60)
HEMOGLOBIN: 7.4 G/DL (ref 12–16)
KETONES, URINE: NEGATIVE MG/DL
LACTATE DEHYDROGENASE: 304 U/L (ref 100–190)
LDL CHOLESTEROL CALCULATED: 56 MG/DL
LEUKOCYTE ESTERASE, URINE: ABNORMAL
LYMPHOCYTES ABSOLUTE: 1.4 K/UL (ref 1–5.1)
LYMPHOCYTES RELATIVE PERCENT: 15.2 %
MAGNESIUM: 2.3 MG/DL (ref 1.8–2.4)
MCH RBC QN AUTO: 28.5 PG (ref 26–34)
MCHC RBC AUTO-ENTMCNC: 32.1 G/DL (ref 31–36)
MCV RBC AUTO: 88.6 FL (ref 80–100)
MICROSCOPIC EXAMINATION: YES
MONOCYTES ABSOLUTE: 1 K/UL (ref 0–1.3)
MONOCYTES RELATIVE PERCENT: 10.6 %
NEUTROPHILS ABSOLUTE: 6.9 K/UL (ref 1.7–7.7)
NEUTROPHILS RELATIVE PERCENT: 73.3 %
NITRITE, URINE: NEGATIVE
PDW BLD-RTO: 18.1 % (ref 12.4–15.4)
PERFORMED ON: ABNORMAL
PERFORMED ON: NORMAL
PERFORMED ON: NORMAL
PH UA: 5.5 (ref 5–8)
PLATELET # BLD: 199 K/UL (ref 135–450)
PMV BLD AUTO: 7.9 FL (ref 5–10.5)
POTASSIUM SERPL-SCNC: 4.6 MMOL/L (ref 3.5–5.1)
PROTEIN UA: 30 MG/DL
RBC # BLD: 2.59 M/UL (ref 4–5.2)
RBC UA: ABNORMAL /HPF (ref 0–4)
SEDIMENTATION RATE, ERYTHROCYTE: 94 MM/HR (ref 0–30)
SODIUM BLD-SCNC: 142 MMOL/L (ref 136–145)
SPECIFIC GRAVITY UA: 1.02 (ref 1–1.03)
TRIGL SERPL-MCNC: 35 MG/DL (ref 0–150)
URINE TYPE: ABNORMAL
UROBILINOGEN, URINE: 0.2 E.U./DL
VLDLC SERPL CALC-MCNC: 7 MG/DL
WBC # BLD: 9.4 K/UL (ref 4–11)
WBC UA: ABNORMAL /HPF (ref 0–5)

## 2020-02-20 PROCEDURE — 36415 COLL VENOUS BLD VENIPUNCTURE: CPT

## 2020-02-20 PROCEDURE — 83735 ASSAY OF MAGNESIUM: CPT

## 2020-02-20 PROCEDURE — 80048 BASIC METABOLIC PNL TOTAL CA: CPT

## 2020-02-20 PROCEDURE — 2580000003 HC RX 258: Performed by: STUDENT IN AN ORGANIZED HEALTH CARE EDUCATION/TRAINING PROGRAM

## 2020-02-20 PROCEDURE — 94761 N-INVAS EAR/PLS OXIMETRY MLT: CPT

## 2020-02-20 PROCEDURE — 86038 ANTINUCLEAR ANTIBODIES: CPT

## 2020-02-20 PROCEDURE — 85025 COMPLETE CBC W/AUTO DIFF WBC: CPT

## 2020-02-20 PROCEDURE — 86140 C-REACTIVE PROTEIN: CPT

## 2020-02-20 PROCEDURE — 85652 RBC SED RATE AUTOMATED: CPT

## 2020-02-20 PROCEDURE — 80061 LIPID PANEL: CPT

## 2020-02-20 PROCEDURE — 94640 AIRWAY INHALATION TREATMENT: CPT

## 2020-02-20 PROCEDURE — 99233 SBSQ HOSP IP/OBS HIGH 50: CPT | Performed by: INTERNAL MEDICINE

## 2020-02-20 PROCEDURE — 6370000000 HC RX 637 (ALT 250 FOR IP): Performed by: STUDENT IN AN ORGANIZED HEALTH CARE EDUCATION/TRAINING PROGRAM

## 2020-02-20 PROCEDURE — 83615 LACTATE (LD) (LDH) ENZYME: CPT

## 2020-02-20 PROCEDURE — 6360000002 HC RX W HCPCS: Performed by: STUDENT IN AN ORGANIZED HEALTH CARE EDUCATION/TRAINING PROGRAM

## 2020-02-20 PROCEDURE — 2700000000 HC OXYGEN THERAPY PER DAY

## 2020-02-20 PROCEDURE — 81001 URINALYSIS AUTO W/SCOPE: CPT

## 2020-02-20 PROCEDURE — 2060000000 HC ICU INTERMEDIATE R&B

## 2020-02-20 RX ORDER — NICOTINE POLACRILEX 4 MG
15 LOZENGE BUCCAL PRN
Status: DISCONTINUED | OUTPATIENT
Start: 2020-02-20 | End: 2020-02-25 | Stop reason: HOSPADM

## 2020-02-20 RX ORDER — LOSARTAN POTASSIUM 25 MG/1
25 TABLET ORAL DAILY
Status: DISCONTINUED | OUTPATIENT
Start: 2020-02-20 | End: 2020-02-21

## 2020-02-20 RX ORDER — PANTOPRAZOLE SODIUM 40 MG/1
40 TABLET, DELAYED RELEASE ORAL
Status: DISCONTINUED | OUTPATIENT
Start: 2020-02-21 | End: 2020-02-25 | Stop reason: HOSPADM

## 2020-02-20 RX ORDER — DEXTROSE MONOHYDRATE 50 MG/ML
100 INJECTION, SOLUTION INTRAVENOUS PRN
Status: DISCONTINUED | OUTPATIENT
Start: 2020-02-20 | End: 2020-02-25 | Stop reason: HOSPADM

## 2020-02-20 RX ORDER — DEXTROSE MONOHYDRATE 25 G/50ML
12.5 INJECTION, SOLUTION INTRAVENOUS PRN
Status: DISCONTINUED | OUTPATIENT
Start: 2020-02-20 | End: 2020-02-25 | Stop reason: HOSPADM

## 2020-02-20 RX ADMIN — ATORVASTATIN CALCIUM 40 MG: 40 TABLET, FILM COATED ORAL at 09:18

## 2020-02-20 RX ADMIN — POLYETHYLENE GLYCOL 3350 17 G: 17 POWDER, FOR SOLUTION ORAL at 09:29

## 2020-02-20 RX ADMIN — GABAPENTIN 800 MG: 400 CAPSULE ORAL at 09:17

## 2020-02-20 RX ADMIN — CEFTRIAXONE 1 G: 1 INJECTION, POWDER, FOR SOLUTION INTRAMUSCULAR; INTRAVENOUS at 14:40

## 2020-02-20 RX ADMIN — HEPARIN SODIUM 5000 UNITS: 5000 INJECTION INTRAVENOUS; SUBCUTANEOUS at 14:40

## 2020-02-20 RX ADMIN — Medication 5000 UNITS: at 09:17

## 2020-02-20 RX ADMIN — HYDROCODONE BITARTRATE AND ACETAMINOPHEN 1 TABLET: 7.5; 325 TABLET ORAL at 01:41

## 2020-02-20 RX ADMIN — FAMOTIDINE 20 MG: 20 TABLET, FILM COATED ORAL at 09:17

## 2020-02-20 RX ADMIN — IPRATROPIUM BROMIDE AND ALBUTEROL SULFATE 1 AMPULE: .5; 3 SOLUTION RESPIRATORY (INHALATION) at 15:46

## 2020-02-20 RX ADMIN — LOSARTAN POTASSIUM 25 MG: 25 TABLET ORAL at 12:06

## 2020-02-20 RX ADMIN — Medication 10 ML: at 09:21

## 2020-02-20 RX ADMIN — FUROSEMIDE 40 MG: 10 INJECTION, SOLUTION INTRAMUSCULAR; INTRAVENOUS at 09:16

## 2020-02-20 RX ADMIN — IPRATROPIUM BROMIDE AND ALBUTEROL SULFATE 1 AMPULE: .5; 3 SOLUTION RESPIRATORY (INHALATION) at 07:59

## 2020-02-20 RX ADMIN — Medication 10 ML: at 23:34

## 2020-02-20 RX ADMIN — HYDROCODONE BITARTRATE AND ACETAMINOPHEN 1 TABLET: 7.5; 325 TABLET ORAL at 16:00

## 2020-02-20 RX ADMIN — FUROSEMIDE 40 MG: 10 INJECTION, SOLUTION INTRAMUSCULAR; INTRAVENOUS at 17:53

## 2020-02-20 RX ADMIN — GABAPENTIN 800 MG: 400 CAPSULE ORAL at 14:39

## 2020-02-20 RX ADMIN — FERROUS GLUCONATE TAB 324 MG (37.5 MG ELEMENTAL IRON) 324 MG: 324 (37.5 FE) TAB at 17:53

## 2020-02-20 RX ADMIN — INSULIN LISPRO 4 UNITS: 100 INJECTION, SOLUTION INTRAVENOUS; SUBCUTANEOUS at 17:54

## 2020-02-20 RX ADMIN — IPRATROPIUM BROMIDE AND ALBUTEROL SULFATE 1 AMPULE: .5; 3 SOLUTION RESPIRATORY (INHALATION) at 11:43

## 2020-02-20 RX ADMIN — CARVEDILOL 12.5 MG: 12.5 TABLET, FILM COATED ORAL at 09:17

## 2020-02-20 RX ADMIN — GABAPENTIN 800 MG: 400 CAPSULE ORAL at 22:18

## 2020-02-20 RX ADMIN — HYDROCODONE BITARTRATE AND ACETAMINOPHEN 1 TABLET: 7.5; 325 TABLET ORAL at 09:17

## 2020-02-20 RX ADMIN — HYDROCODONE BITARTRATE AND ACETAMINOPHEN 1 TABLET: 7.5; 325 TABLET ORAL at 22:17

## 2020-02-20 RX ADMIN — FERROUS GLUCONATE TAB 324 MG (37.5 MG ELEMENTAL IRON) 324 MG: 324 (37.5 FE) TAB at 09:16

## 2020-02-20 RX ADMIN — CARVEDILOL 12.5 MG: 12.5 TABLET, FILM COATED ORAL at 17:53

## 2020-02-20 RX ADMIN — Medication 10 ML: at 17:55

## 2020-02-20 ASSESSMENT — PAIN SCALES - GENERAL
PAINLEVEL_OUTOF10: 7
PAINLEVEL_OUTOF10: 0
PAINLEVEL_OUTOF10: 7
PAINLEVEL_OUTOF10: 10

## 2020-02-20 ASSESSMENT — PAIN DESCRIPTION - LOCATION
LOCATION: GENERALIZED

## 2020-02-20 ASSESSMENT — PAIN DESCRIPTION - ONSET: ONSET: AWAKENED FROM SLEEP

## 2020-02-20 ASSESSMENT — PAIN DESCRIPTION - FREQUENCY
FREQUENCY: INTERMITTENT
FREQUENCY: INTERMITTENT

## 2020-02-20 ASSESSMENT — PAIN DESCRIPTION - PAIN TYPE
TYPE: CHRONIC PAIN

## 2020-02-20 ASSESSMENT — PAIN DESCRIPTION - PROGRESSION: CLINICAL_PROGRESSION: GRADUALLY WORSENING

## 2020-02-20 ASSESSMENT — PAIN DESCRIPTION - DESCRIPTORS
DESCRIPTORS: ACHING;DISCOMFORT
DESCRIPTORS: ACHING;DISCOMFORT
DESCRIPTORS: ACHING;SORE

## 2020-02-20 NOTE — CARE COORDINATION
Case Management Assessment           Daily Note                 Date/ Time of Note: 2/20/2020 2:44 PM         Note completed by: Gertrudis Anderson    Patient Name: Issac Wallace  YOB: 1960    Diagnosis:Pulmonary edema, acute, with congestive heart disease ([facility] [address] [I50.1]  Pulmonary edema, acute, with congestive heart disease ([facility] [address] [I50.1]  Patient Admission Status: Inpatient    Date of Admission:2/19/2020  6:04 AM Length of Stay: 1 GLOS:      Current Plan of Care: IV diuresis with Lasix, weaning O2, possible new home O2 if unable to wean  ________________________________________________________________________________________  PT AM-PAC:   / 24 per last evaluation on: not ordered    OT AM-PAC:   / 24 per last evaluation on: not ordered    DME Needs for discharge: possible new home oxygen- if unable to wean (baseline NONE- CURRENTLY at 4L)  ________________________________________________________________________________________  Discharge Plan: Home    Tentative discharge date: TBD    Current barriers to discharge: Medical clearance, weaning oxygen, diuresis per Cardiology    Referrals completed: Not Applicable    Resources/ information provided: Not indicated at this time  ________________________________________________________________________________________  Case Management Notes: CM continues to follow for discharge planning and needs. Patient continues with IV lasix for diuresis and weaning from oxygen, currently at 4L with none at her baseline. CM will continue to follow for further discharge planning and possible new home oxygen set up. HOME OXYGEN ORDERING GUIDELINE:    Home oxygen evaluation to be done by respiratory therapist to evaluate for need for home oxygen at discharge. ATTENDING MD to place DME order for Oxygen AFTER respiratory therapy testing has been completed. Testing must be done prior to DME order being placed and ATTENDING MD or CHANG certified practitioner.       Hannah Pinto and her family were provided with choice of provider; she and her family are in agreement with the discharge plan.     Care Transition Patient: Peyton Luna RN  [facility]  Case Management Department  Ph: [phone]  Fax: [phone]

## 2020-02-20 NOTE — PROGRESS NOTES
for input(s): AST, ALT, LIPASE, BILIDIR, BILITOT, ALKPHOS in the last 72 hours. Invalid input(s): AMYLASE,  ALB  PT/INR: No results for input(s): PROTIME, INR in the last 72 hours. APTT: No results for input(s): APTT in the last 72 hours. BNP:  No results for input(s): BNP in the last 72 hours. IMAGING:     Assessment:  Patient Active Problem List    Diagnosis Date Noted    Anemia 05/24/2017     Priority: Low    On amiodarone therapy 09/11/2016     Priority: Low    Hyperlipidemia 09/11/2016     Priority: Low    Colitis presumed infectious      Priority: Low    Persistent atrial fibrillation 06/01/2016     Priority: Low    Sepsis ([facility] [address] 06/01/2016     Priority: Low    Acute renal failure (HCC)      Priority: Low    Non-intractable vomiting with nausea      Priority: Low    Lactic acidemia      Priority: Low    Leukocytosis      Priority: Low    DKA (diabetic ketoacidoses) ([facility] [address]      Priority: Low    Dilated cardiomyopathy ([facility] [address] 02/20/2020    Pulmonary edema, acute, with congestive heart disease ([facility] [address] 02/19/2020    Elevated troponin 02/19/2020    Essential hypertension 02/19/2020    Pneumonia, interstitial ([facility] [address] 04/07/2019    Acute renal failure superimposed on chronic kidney disease ([facility] [address] 04/07/2019    Uncontrolled type 2 diabetes mellitus with hyperglycemia ([facility] [address] 04/07/2019    Acute on chronic diastolic congestive heart failure ([facility] [address] 04/04/2019    Leg swelling 03/12/2019    Venous insufficiency 03/12/2019    Chronic venous htn w inflammation of bilateral low extrm 03/12/2019     Imp: CHF/trop/RF/HTN/cardiomyop    Plan:  1. Breathing much better. Good diuresis. Continue IV lasix. Watch cr. Echo now showing LV dysfunction. Global consistent with cardiomyopathy. Have discussed options to evaluated. Diurese another 24 hrs. Maybe Jared Larsen in am to assess cardiomyopathy and avoid contrast in view of elevated cr.         Core Measures:  · Discharge instructions:   · LVEF documented:

## 2020-02-20 NOTE — PROGRESS NOTES
diaphoretic. HENT:      Head: Normocephalic and atraumatic. Eyes:      General: No scleral icterus. Right eye: No discharge. Left eye: No discharge. Extraocular Movements: Extraocular movements intact. Conjunctiva/sclera: Conjunctivae normal.      Pupils: Pupils are equal, round, and reactive to light. Cardiovascular:      Rate and Rhythm: Normal rate and regular rhythm. Pulses: Normal pulses. Heart sounds: Normal heart sounds. No murmur. No friction rub. No gallop. Pulmonary:      Effort: No respiratory distress. Breath sounds: Normal breath sounds. No wheezing. Comments: Currently on 4L   Abdominal:      General: Abdomen is flat. Bowel sounds are normal. There is no distension. Palpations: Abdomen is soft. Tenderness: There is no abdominal tenderness. There is no guarding. Musculoskeletal: Normal range of motion. Right lower leg: Edema (1+ pitting) present. Left lower leg: Edema (1+ pitting) present. Skin:     General: Skin is warm and dry. Neurological:      General: No focal deficit present. Mental Status: She is alert and oriented to person, place, and time. Mental status is at baseline. Cranial Nerves: No cranial nerve deficit. Sensory: No sensory deficit. Motor: No weakness. Gait: Gait normal.   Psychiatric:         Mood and Affect: Mood normal.         Behavior: Behavior normal.         Thought Content:  Thought content normal.         Judgment: Judgment normal.           LABS:    CBC:   Recent Labs     02/19/20  0631 02/20/20  0500   WBC 14.3* 9.4   HGB 8.1* 7.4*   HCT 26.0* 22.9*    199   MCV 89.7 88.6     Renal:    Recent Labs     02/19/20  0631 02/20/20  0500    142   K 5.5* 4.6    106   CO2 22 20*   BUN 46* 38*   CREATININE 2.1* 2.1*   GLUCOSE 187* 73   CALCIUM 8.8 9.1   MG 2.40 2.30   ANIONGAP 10 16     Hepatic: No results for input(s): AST, ALT, BILITOT, BILIDIR, PROT, LABALBU, ALKPHOS in the last 72 hours. Troponin:   Recent Labs     02/19/20  0631 02/19/20  1308   TROPONINI 0.03* 0.07*     BNP: No results for input(s): BNP in the last 72 hours. Lipids: No results for input(s): CHOL, HDL in the last 72 hours. Invalid input(s): LDLCALCU, TRIGLYCERIDE  ABGs:  No results for input(s): PHART, SSG4DYT, PO2ART, KTM7OEK, BEART, THGBART, T1NXJMIF, QMJ4RFY in the last 72 hours. INR: No results for input(s): INR in the last 72 hours. Lactate: No results for input(s): LACTATE in the last 72 hours. Cultures:  -----------------------------------------------------------------  RAD:   XR CHEST PORTABLE   Final Result     Pulmonary vascular prominence with perihilar and bibasilar asymmetric    changes are noted. Stable cardiomegaly. Findings are most consistent    with pulmonary edema. No definite focal consolidation. No large pleural    effusion noted in the lateral projection or pneumothorax. Assessment/Plan:     Kimberlee Rincon is a 62 y.o. female with chronic systolic  heart failure.        Acute hypoxic, hypercapnic respiratory failure 2/2 to diastolic CHF exacerbation  -- In setting of pickwickian syndrome   -- Furosemide injection 40 mg BID  -- Echo (last echo done in 2016, EF 55-60%); Consult Cardiology. --Echo on 2/19/20 35-40% with L ventricular hypokinesis diffuse.    -- Daily Weights, strict I&O   -- Heart failure nurse   --Losartan   -Cardiology consulted      History of smoking with possible undiagnosed COPD & SHAQ & OHS   - Wheezing on lung exam   - No PFT on file   - Sleep consult as outpatient   - Pulmonary consult as out patient   - Duonebs   - Keep saturation greater than 90%      Anemia Chronic mixed iron deficiency and chronic disease   Ferrous sulfate 325      Hypertension  -- Coreg 12.5 mg BID with meals on hold for diuresis      Afib- Currently in Afib on tele   -- Amiodarone 200 mg PO qd on hold   -Restart home Coreg      GERD  Protonix     DMII   -- Insulin sliding scale   --  Insulin Glargine 60 Units s/c qn      HLD  -- Lipitor PO 40 mg qd  -- Lipid panel    Leukocytosis: 14.3 on admission. Patient was afebrile. Resolved   UA ordered      Code Status:  Full Code  FEN: Renal and cardiac   PPX:  SCDs, Heparin   DISPO:  IP     Mari Bowers, PGY-1  02/20/20  12:42 PM    This patient has been staffed and discussed with Yolanda Knutson MD.   Patient seen and examined, labs and imaging studies reviewed, agree with assessment and plan as outlined above. Continue with care greater than 35 minutes spent on case over half face to face.      Yolanda Knutson MD [address]

## 2020-02-20 NOTE — PLAN OF CARE
Problem: Pain:  Goal: Control of chronic pain  Description  Control of chronic pain  Outcome: Ongoing  Note:   Patient has chronic generalized pain, requests Norco at times overnight. Requested prior to being able to, offered Tylenol but denied. Given at 0141 after awoke for vitals, rated pain 10/10. On pain reassess patient appeared to be resting comfortably in bed. Will continue to monitor. Stalin Moise 2/20/2020      Problem: Falls - Risk of:  Goal: Will remain free from falls  Description  Will remain free from falls  Outcome: Ongoing  Note:   Patient remained free from falls overnight. Called appropriately for assist or asked to get up when staff already in room. Bed alarm remains active, bed in low/locked position, non-skid footwear in place, and call light in reach. Will continue fall risk protocol. Stalin Moise 2/20/2020      Problem: Respiratory  Goal: O2 Sat > 90%  Outcome: Ongoing  Note:   Patient on 5L NC at start of shift, dyspnea on exertion. Overnight O2 saturation improved from low 90s to % on 5L, titrated to 4L. Dyspnea improved upon ambulation to bathroom this AM. Will continue to monitor.     Stalin Moise 2/20/2020

## 2020-02-20 NOTE — PLAN OF CARE
Problem: Pain:  Goal: Control of chronic pain  Description  Control of chronic pain  2/20/2020 1617 by Agnes Vazquez RN  Outcome: Ongoing     Problem: Falls - Risk of:  Goal: Will remain free from falls  Description  Will remain free from falls  2/20/2020 1617 by Agnes Vazquez RN  Outcome: Ongoing

## 2020-02-21 LAB
ANION GAP SERPL CALCULATED.3IONS-SCNC: 15 MMOL/L (ref 3–16)
ANTI-NUCLEAR ANTIBODY (ANA): NEGATIVE
BASOPHILS ABSOLUTE: 0.1 K/UL (ref 0–0.2)
BASOPHILS RELATIVE PERCENT: 0.6 %
BUN BLDV-MCNC: 48 MG/DL (ref 7–20)
CALCIUM SERPL-MCNC: 8.9 MG/DL (ref 8.3–10.6)
CHLORIDE BLD-SCNC: 104 MMOL/L (ref 99–110)
CO2: 23 MMOL/L (ref 21–32)
CREAT SERPL-MCNC: 2.5 MG/DL (ref 0.6–1.2)
EOSINOPHILS ABSOLUTE: 0.1 K/UL (ref 0–0.6)
EOSINOPHILS RELATIVE PERCENT: 1.6 %
GFR AFRICAN AMERICAN: 24
GFR NON-AFRICAN AMERICAN: 20
GLUCOSE BLD-MCNC: 121 MG/DL (ref 70–99)
GLUCOSE BLD-MCNC: 153 MG/DL (ref 70–99)
GLUCOSE BLD-MCNC: 205 MG/DL (ref 70–99)
GLUCOSE BLD-MCNC: 72 MG/DL (ref 70–99)
GLUCOSE BLD-MCNC: 83 MG/DL (ref 70–99)
GLUCOSE BLD-MCNC: 87 MG/DL (ref 70–99)
HCT VFR BLD CALC: 23.2 % (ref 36–48)
HEMOGLOBIN: 7.5 G/DL (ref 12–16)
LV EF: 54 %
LVEF MODALITY: NORMAL
LYMPHOCYTES ABSOLUTE: 2.1 K/UL (ref 1–5.1)
LYMPHOCYTES RELATIVE PERCENT: 24.9 %
MAGNESIUM: 2.3 MG/DL (ref 1.8–2.4)
MCH RBC QN AUTO: 28.5 PG (ref 26–34)
MCHC RBC AUTO-ENTMCNC: 32.4 G/DL (ref 31–36)
MCV RBC AUTO: 88.1 FL (ref 80–100)
MONOCYTES ABSOLUTE: 1.1 K/UL (ref 0–1.3)
MONOCYTES RELATIVE PERCENT: 13.2 %
NEUTROPHILS ABSOLUTE: 5 K/UL (ref 1.7–7.7)
NEUTROPHILS RELATIVE PERCENT: 59.7 %
PDW BLD-RTO: 17.9 % (ref 12.4–15.4)
PERFORMED ON: ABNORMAL
PERFORMED ON: NORMAL
PERFORMED ON: NORMAL
PLATELET # BLD: 211 K/UL (ref 135–450)
PMV BLD AUTO: 8.4 FL (ref 5–10.5)
POTASSIUM SERPL-SCNC: 4.5 MMOL/L (ref 3.5–5.1)
RBC # BLD: 2.63 M/UL (ref 4–5.2)
SODIUM BLD-SCNC: 142 MMOL/L (ref 136–145)
WBC # BLD: 8.4 K/UL (ref 4–11)

## 2020-02-21 PROCEDURE — 3430000000 HC RX DIAGNOSTIC RADIOPHARMACEUTICAL: Performed by: INTERNAL MEDICINE

## 2020-02-21 PROCEDURE — 6370000000 HC RX 637 (ALT 250 FOR IP): Performed by: STUDENT IN AN ORGANIZED HEALTH CARE EDUCATION/TRAINING PROGRAM

## 2020-02-21 PROCEDURE — 2700000000 HC OXYGEN THERAPY PER DAY

## 2020-02-21 PROCEDURE — 2060000000 HC ICU INTERMEDIATE R&B

## 2020-02-21 PROCEDURE — 6360000002 HC RX W HCPCS: Performed by: STUDENT IN AN ORGANIZED HEALTH CARE EDUCATION/TRAINING PROGRAM

## 2020-02-21 PROCEDURE — 80048 BASIC METABOLIC PNL TOTAL CA: CPT

## 2020-02-21 PROCEDURE — 99233 SBSQ HOSP IP/OBS HIGH 50: CPT | Performed by: INTERNAL MEDICINE

## 2020-02-21 PROCEDURE — 99221 1ST HOSP IP/OBS SF/LOW 40: CPT | Performed by: SURGERY

## 2020-02-21 PROCEDURE — 2580000003 HC RX 258: Performed by: STUDENT IN AN ORGANIZED HEALTH CARE EDUCATION/TRAINING PROGRAM

## 2020-02-21 PROCEDURE — 83036 HEMOGLOBIN GLYCOSYLATED A1C: CPT

## 2020-02-21 PROCEDURE — 93017 CV STRESS TEST TRACING ONLY: CPT

## 2020-02-21 PROCEDURE — 94640 AIRWAY INHALATION TREATMENT: CPT

## 2020-02-21 PROCEDURE — 36415 COLL VENOUS BLD VENIPUNCTURE: CPT

## 2020-02-21 PROCEDURE — 6370000000 HC RX 637 (ALT 250 FOR IP): Performed by: SURGERY

## 2020-02-21 PROCEDURE — 78452 HT MUSCLE IMAGE SPECT MULT: CPT

## 2020-02-21 PROCEDURE — 6360000002 HC RX W HCPCS: Performed by: INTERNAL MEDICINE

## 2020-02-21 PROCEDURE — A9502 TC99M TETROFOSMIN: HCPCS | Performed by: INTERNAL MEDICINE

## 2020-02-21 PROCEDURE — 85025 COMPLETE CBC W/AUTO DIFF WBC: CPT

## 2020-02-21 PROCEDURE — 83735 ASSAY OF MAGNESIUM: CPT

## 2020-02-21 RX ORDER — FUROSEMIDE 10 MG/ML
40 INJECTION INTRAMUSCULAR; INTRAVENOUS 2 TIMES DAILY
Status: DISCONTINUED | OUTPATIENT
Start: 2020-02-21 | End: 2020-02-22

## 2020-02-21 RX ORDER — CLINDAMYCIN PHOSPHATE 11.9 MG/ML
SOLUTION TOPICAL 2 TIMES DAILY
Status: DISCONTINUED | OUTPATIENT
Start: 2020-02-21 | End: 2020-02-25 | Stop reason: HOSPADM

## 2020-02-21 RX ADMIN — GABAPENTIN 800 MG: 400 CAPSULE ORAL at 13:06

## 2020-02-21 RX ADMIN — Medication 10 ML: at 08:43

## 2020-02-21 RX ADMIN — CLINDAMYCIN PHOSPHATE: 10 SOLUTION TOPICAL at 14:36

## 2020-02-21 RX ADMIN — TETROFOSMIN 35 MILLICURIE: 1.38 INJECTION, POWDER, LYOPHILIZED, FOR SOLUTION INTRAVENOUS at 11:01

## 2020-02-21 RX ADMIN — INSULIN LISPRO 4 UNITS: 100 INJECTION, SOLUTION INTRAVENOUS; SUBCUTANEOUS at 17:06

## 2020-02-21 RX ADMIN — CEFTRIAXONE 1 G: 1 INJECTION, POWDER, FOR SOLUTION INTRAMUSCULAR; INTRAVENOUS at 13:06

## 2020-02-21 RX ADMIN — INSULIN LISPRO 1 UNITS: 100 INJECTION, SOLUTION INTRAVENOUS; SUBCUTANEOUS at 22:20

## 2020-02-21 RX ADMIN — GABAPENTIN 800 MG: 400 CAPSULE ORAL at 22:18

## 2020-02-21 RX ADMIN — LOSARTAN POTASSIUM 25 MG: 25 TABLET ORAL at 08:43

## 2020-02-21 RX ADMIN — CARVEDILOL 12.5 MG: 12.5 TABLET, FILM COATED ORAL at 08:43

## 2020-02-21 RX ADMIN — POLYETHYLENE GLYCOL 3350 17 G: 17 POWDER, FOR SOLUTION ORAL at 08:43

## 2020-02-21 RX ADMIN — HYDROCODONE BITARTRATE AND ACETAMINOPHEN 1 TABLET: 7.5; 325 TABLET ORAL at 03:52

## 2020-02-21 RX ADMIN — Medication 10 ML: at 11:01

## 2020-02-21 RX ADMIN — FERROUS GLUCONATE TAB 324 MG (37.5 MG ELEMENTAL IRON) 324 MG: 324 (37.5 FE) TAB at 08:43

## 2020-02-21 RX ADMIN — ATORVASTATIN CALCIUM 40 MG: 40 TABLET, FILM COATED ORAL at 08:43

## 2020-02-21 RX ADMIN — IPRATROPIUM BROMIDE AND ALBUTEROL SULFATE 1 AMPULE: .5; 3 SOLUTION RESPIRATORY (INHALATION) at 07:44

## 2020-02-21 RX ADMIN — HYDROCODONE BITARTRATE AND ACETAMINOPHEN 1 TABLET: 7.5; 325 TABLET ORAL at 18:30

## 2020-02-21 RX ADMIN — FERROUS GLUCONATE TAB 324 MG (37.5 MG ELEMENTAL IRON) 324 MG: 324 (37.5 FE) TAB at 17:06

## 2020-02-21 RX ADMIN — PANTOPRAZOLE SODIUM 40 MG: 40 TABLET, DELAYED RELEASE ORAL at 08:43

## 2020-02-21 RX ADMIN — Medication 5000 UNITS: at 08:43

## 2020-02-21 RX ADMIN — CLINDAMYCIN PHOSPHATE: 10 SOLUTION TOPICAL at 22:18

## 2020-02-21 RX ADMIN — GABAPENTIN 800 MG: 400 CAPSULE ORAL at 08:43

## 2020-02-21 RX ADMIN — CARVEDILOL 12.5 MG: 12.5 TABLET, FILM COATED ORAL at 17:07

## 2020-02-21 RX ADMIN — HYDROCODONE BITARTRATE AND ACETAMINOPHEN 1 TABLET: 7.5; 325 TABLET ORAL at 12:27

## 2020-02-21 RX ADMIN — Medication 10 ML: at 22:28

## 2020-02-21 RX ADMIN — FUROSEMIDE 40 MG: 10 INJECTION, SOLUTION INTRAMUSCULAR; INTRAVENOUS at 12:21

## 2020-02-21 RX ADMIN — FUROSEMIDE 40 MG: 10 INJECTION, SOLUTION INTRAMUSCULAR; INTRAVENOUS at 17:06

## 2020-02-21 RX ADMIN — IPRATROPIUM BROMIDE AND ALBUTEROL SULFATE 1 AMPULE: .5; 3 SOLUTION RESPIRATORY (INHALATION) at 20:09

## 2020-02-21 RX ADMIN — REGADENOSON 0.4 MG: 0.08 INJECTION, SOLUTION INTRAVENOUS at 11:01

## 2020-02-21 RX ADMIN — IPRATROPIUM BROMIDE AND ALBUTEROL SULFATE 1 AMPULE: .5; 3 SOLUTION RESPIRATORY (INHALATION) at 15:27

## 2020-02-21 ASSESSMENT — PAIN SCALES - GENERAL
PAINLEVEL_OUTOF10: 10
PAINLEVEL_OUTOF10: 0
PAINLEVEL_OUTOF10: 10
PAINLEVEL_OUTOF10: 0
PAINLEVEL_OUTOF10: 10
PAINLEVEL_OUTOF10: 8
PAINLEVEL_OUTOF10: 10
PAINLEVEL_OUTOF10: 0
PAINLEVEL_OUTOF10: 10
PAINLEVEL_OUTOF10: 10

## 2020-02-21 ASSESSMENT — PAIN DESCRIPTION - ORIENTATION
ORIENTATION: RIGHT;LEFT

## 2020-02-21 ASSESSMENT — PAIN DESCRIPTION - DESCRIPTORS
DESCRIPTORS: ACHING
DESCRIPTORS: ACHING;SORE
DESCRIPTORS: ACHING

## 2020-02-21 ASSESSMENT — PAIN DESCRIPTION - ONSET
ONSET: ON-GOING

## 2020-02-21 ASSESSMENT — PAIN DESCRIPTION - FREQUENCY
FREQUENCY: CONTINUOUS
FREQUENCY: INTERMITTENT
FREQUENCY: CONTINUOUS

## 2020-02-21 ASSESSMENT — PAIN DESCRIPTION - LOCATION
LOCATION: GENERALIZED;SHOULDER;KNEE
LOCATION: GENERALIZED

## 2020-02-21 ASSESSMENT — PAIN DESCRIPTION - PAIN TYPE
TYPE: CHRONIC PAIN

## 2020-02-21 ASSESSMENT — PAIN - FUNCTIONAL ASSESSMENT
PAIN_FUNCTIONAL_ASSESSMENT: PREVENTS OR INTERFERES SOME ACTIVE ACTIVITIES AND ADLS

## 2020-02-21 ASSESSMENT — PAIN DESCRIPTION - PROGRESSION
CLINICAL_PROGRESSION: NOT CHANGED

## 2020-02-21 NOTE — PLAN OF CARE
Problem: Pain:  Goal: Pain level will decrease  Description  Pain level will decrease  Outcome: Ongoing  Note:   Pt has been experiencing 10/10 generalized pain that is worse in their shoulders and knees; q6 norco provides some relief, but pt still states \"if you ask me what my pain is its 10/10. \"      Problem: Falls - Risk of:  Goal: Will remain free from falls  Description  Will remain free from falls  Outcome: Ongoing  Note:   Fall socks on; pt uses call light appropriately and has been free of falls throughout shift. Goal: Absence of physical injury  Description  Absence of physical injury  Outcome: Ongoing  Note:   No new injuries noted throughout shift. Problem: Respiratory  Goal: O2 Sat > 90%  Outcome: Ongoing  Note:   Pt was oxygenating greater than 90% Sp02 throughout shift with the exception of one incident where their nasal cannula was removed and they desaturated; O2 was back above 90% within 2min of restarting O2 therapy. Will continue to monitor.

## 2020-02-21 NOTE — PROGRESS NOTES
46* 38* 48*   CREATININE 2.1* 2.1* 2.5*   GLUCOSE 187* 73 87   CALCIUM 8.8 9.1 8.9   MG 2.40 2.30 2.30   ANIONGAP 10 16 15     Hepatic: No results for input(s): AST, ALT, BILITOT, BILIDIR, PROT, LABALBU, ALKPHOS in the last 72 hours. Troponin:   Recent Labs     02/19/20  0631 02/19/20  1308   TROPONINI 0.03* 0.07*     BNP: No results for input(s): BNP in the last 72 hours. Lipids:   Recent Labs     02/20/20  0500   CHOL 175   *     ABGs:  No results for input(s): PHART, SIJ4HTJ, PO2ART, CDC7VYZ, BEART, THGBART, S3VAFZZF, OOV5NYC in the last 72 hours. INR: No results for input(s): INR in the last 72 hours. Lactate: No results for input(s): LACTATE in the last 72 hours. Cultures:  -----------------------------------------------------------------  RAD:   XR CHEST PORTABLE   Final Result     Pulmonary vascular prominence with perihilar and bibasilar asymmetric    changes are noted. Stable cardiomegaly. Findings are most consistent    with pulmonary edema. No definite focal consolidation. No large pleural    effusion noted in the lateral projection or pneumothorax. Assessment/Plan:   Benny Fatima a 62 y.o. female with chronic systolic  heart failure.        Acute hypoxic, [id number] NKFORTGACSZ JYSSYDU 8/7 to diastolic CHF exacerbation  -- In setting of pickwickian syndrome   -- Furosemide injection 40 mg BID  -- Echo (last echo done in 2016, EF 55-60%); Consult Cardiology. --Echo on 2/19/20 35-40% with L ventricular hypokinesis diffuse.    -- Daily Weights, strict I&O   -- Heart failure nurse   --Cardiology consulted appreciate recommendations   -Lydia scan planned today      History of smoking with possible undiagnosed COPD & SHAQ & OHS   - Wheezing on lung exam   - No PFT on file   - Sleep consult as outpatient   - Pulmonary consult as out patient   - Duonebs   - Keep saturation greater than 90%      Anemia Chronic mixed iron deficiency and chronic disease   Ferrous gluconate     Hypertension  -- Coreg 12.5 mg BID with meals      Paroxysmal A fib  [id number] score=5 Has ATRIA bleeding risk- high therefore not recommended to be on warfarin. HASBLED =2   -- Amiodarone 200 mg PO qd on hold   --Restart home Coreg   --Was previously on Warfarin and Eliquis but had interal bleeding and was removed by cardiologist      LAURIE on CKD 4  -Nephrology following  -Lasix 40 daily   -Daily BMP     GERD  Protonix     DMII: patinet had hypoglycemia and lantus dose was changed  -- MDSS  --  Insulin Glargine 25 Units s/c qn   -- Hypoglycemia protocol      HLD  -- Lipitor PO 40 mg qd    Leukocytosis: 14.3 on admission. Patient was afebrile. Resolved   UA ordered that ws moderate LE     Morbid obesity: BMI 45.9  Diet renal with carb control       Code Status:  Full Code  FEN: Renal and cardiac   PPX:  SCDs, Heparin   DISPO:  ESVIN Morrison, PGY-1  02/21/20  8:52 AM    This patient has been staffed and discussed with Miguel A Vazquez MD.   Patient seen and examined, labs and imaging studies reviewed, agree with assessment and plan as outlined above. She's feeling better, discussed case with surgery dr. Mariann Hart, discussed with nephrology, greater than 35 minutes spent on case over half face to face.      Miguel A Vazquez MD [address]

## 2020-02-21 NOTE — PLAN OF CARE
Problem: Pain:  Goal: Pain level will decrease  Description  Pain level will decrease  2/21/2020 1730 by Richard Bowens RN  Note:   Pt reports chronic generalized pain 10/10 on numeric pain scale. Pt has been receiving PO Norco q6h as needed with minimal relief reported (see doc flow sheets). Pt assisted to position of comfort in bed. Will continue to monitor pain level and administer PRN medications as needed/ordered. Problem: Falls - Risk of:  Goal: Will remain free from falls  Description  Will remain free from falls  2/21/2020 1730 by Richard Bowens RN  Note:   Pt is a Fall Risk. See [facility] [place] Fall Risk Score. Pt bed in low position, bed wheels locked, non-skid socks in use, bed alarm on, and 2/4 side rails up. Call light and belongings in reach and pt encouraged to call for assistance. Will continue with hourly rounds for PO intake, pain needs, toileting, and repositioning as needed. No needs expressed at this time. Problem: OXYGENATION/RESPIRATORY FUNCTION  Goal: Patient will maintain patent airway  Note:   SpO2 level 93% on 1L via nasal cannula. Lungs are clear/diminished to auscultation. Pt reported SIERRA; denies cough or dizziness at this time. Pt receiving breathing treatments q4h while awake via RT. Trace edema present in BLE. Pt receiving IV Lasix BID as ordered. I/O being monitored closely. External female urinary catheter in use to monitor output. Graduated drinking pitcher utilized for intake monitoring. Daily weight completed. Scheduled medications given as ordered. Will continue to monitor and report changes in condition to physician.

## 2020-02-21 NOTE — PROGRESS NOTES
documented:   · ACEI for LV dysfunction:   · Smoking Cessation:    Aliyah Gandhi MD 2/21/2020 1:37 PM

## 2020-02-21 NOTE — CONSULTS
Brief Nutrition Note/ Consult    Consult received for diet education r/t DM. Pt was offered on 2/20 education for low sodium and was not receptive to education. Will continue to follow per nutrition standards of care.      Electronically signed by Brian Troncoso RD, LD on 2/21/2020 at 3:48 PM  Office:  [phone]

## 2020-02-21 NOTE — PROGRESS NOTES
Patient stated she did not feel like she needed a breathing treatment to night, she is aware to call if she needs a HHN before morning.

## 2020-02-21 NOTE — CONSULTS
General Surgery   Resident Consult Note    Reason for Consult: left axilla fluid collection    History of Present Illness:   Amanda Beltran is a 62 y.o. female with Hx of CHF, DM II who is here for CHF exacerbation after eating at [facility]. Pt states she has L arm abscess that was drained by her son a few days ago. She endorses similar symptoms in the R axilla and posterior neck. She states she has surgical excision for the neck hydradenitis 4 years ago while she was in the hospital. Other than that, she has been managing these fluid collections with self- I and D. She has no fever and leukocytosis has resolved since admission. Was started on rocephin yesterday by primary team.    Past Medical History:        Diagnosis Date    [facility]est[facility]) [facility] [address]     Chronic kidney disease     Diabetes mellitus ([facility] [address]     Hyperlipidemia     Hypertension     MDRO (multiple drug resistant organisms) resistance 6/3/2016    E Coli-urine       Past Surgical History:        Procedure Laterality Date    COLONOSCOPY      ENDOSCOPY, COLON, DIAGNOSTIC      TUBAL LIGATION         Allergies:  Dicumarol; Percocet [oxycodone-acetaminophen]; and Warfarin and related    Medications:   Home Meds  No current facility-administered medications on file prior to encounter.       Current Outpatient Medications on File Prior to Encounter   Medication Sig Dispense Refill    insulin glargine (LANTUS SOLOSTAR) 100 UNIT/ML injection pen Inject 60 Units into the skin nightly      polyethylene glycol (GLYCOLAX) packet Take 17 g by mouth daily      polyethylene glycol (GLYCOLAX) packet Take 17 g by mouth nightly as needed for Constipation      amiodarone (CORDARONE) 200 MG tablet TAKE 1 TABLET BY MOUTH DAILY 90 tablet 3    carvedilol (COREG) 12.5 MG tablet Take 12.5 mg by mouth 2 times daily (with meals)      ferrous gluconate (FERGON) 324 (38 Fe) MG tablet Take 324 mg by mouth 2 times daily (with meals)      amLODIPine (NORVASC) 10 MG tablet Take 1 tablet by mouth daily 30 tablet 5    HYDROcodone-acetaminophen (NORCO) 7.5-325 MG per tablet Take 1 tablet by mouth every 4 hours as needed for Pain.        famotidine (PEPCID) 40 MG tablet Take 1 tablet by mouth 2 times daily 60 tablet 3    atorvastatin (LIPITOR) 40 MG tablet Take 40 mg by mouth nightly       gabapentin (NEURONTIN) 800 MG tablet Take 800 mg by mouth 3 times daily         Current Meds  furosemide (LASIX) injection 40 mg, BID  clindamycin (CLEOCIN T) 1 % external solution, BID  pantoprazole (PROTONIX) tablet 40 mg, QAM AC  cefTRIAXone (ROCEPHIN) 1 g IVPB in 50 mL D5W minibag, Q24H  insulin glargine (LANTUS) injection pen 25 Units, Nightly  glucose (GLUTOSE) 40 % oral gel 15 g, PRN  dextrose 50 % IV solution, PRN  glucagon (rDNA) injection 1 mg, PRN  dextrose 5 % solution, PRN  ipratropium-albuterol (DUONEB) nebulizer solution 1 ampule, Q4H WA  [Held by provider] amiodarone (CORDARONE) tablet 200 mg, Daily  atorvastatin (LIPITOR) tablet 40 mg, Daily  carvedilol (COREG) tablet 12.5 mg, BID WC  ferrous gluconate 324 (37.5 Fe) MG tablet 324 mg, BID WC  gabapentin (NEURONTIN) capsule 800 mg, TID  HYDROcodone-acetaminophen (NORCO) 7.5-325 MG per tablet 1 tablet, Q6H PRN  insulin lispro (1 Unit Dial) 0-12 Units, TID WC  insulin lispro (1 Unit Dial) 0-6 Units, Nightly  sodium chloride flush 0.9 % injection 10 mL, 2 times per day  sodium chloride flush 0.9 % injection 10 mL, PRN  acetaminophen (TYLENOL) tablet 650 mg, Q6H PRN  acetaminophen (TYLENOL) suppository 650 mg, Q6H PRN  polyethylene glycol (GLYCOLAX) packet 17 g, Daily PRN  promethazine (PHENERGAN) tablet 12.5 mg, Q6H PRN  ondansetron (ZOFRAN) injection 4 mg, Q6H PRN  perflutren lipid microspheres (DEFINITY) injection 1.65 mg, ONCE PRN  heparin (porcine) injection 5,000 Units, 3 times per day  vitamin D (CHOLECALCIFEROL) capsule 5,000 Units, Daily        Family History:   Family History   Problem Relation Age of Onset    Diabetes 23   BUN 46* 38* 48*   CREATININE 2.1* 2.1* 2.5*     PT/INR: No results for input(s): PROTIME, INR in the last 72 hours. APTT: No results for input(s): APTT in the last 72 hours. Liver Profile:   Lab Results   Component Value Date    AST 11 07/30/2019    ALT 11 07/30/2019    BILIDIR <0.2 04/04/2019    BILITOT <0.2 07/30/2019    ALKPHOS 87 07/30/2019     Lab Results   Component Value Date    CHOL 175 02/20/2020     02/20/2020    TRIG 35 02/20/2020     UA:   Lab Results   Component Value Date    COLORU Yellow 02/20/2020    PHUR 5.5 02/20/2020    LABCAST 0-1 Coarse Gran 05/20/2017    WBCUA 11-20 02/20/2020    RBCUA 0-2 02/20/2020    BACTERIA 4+ 02/20/2020    CLARITYU Clear 02/20/2020    SPECGRAV 1.020 02/20/2020    LEUKOCYTESUR MODERATE 02/20/2020    UROBILINOGEN 0.2 02/20/2020    BILIRUBINUR Negative 02/20/2020    BLOODU TRACE-INTACT 02/20/2020    GLUCOSEU Negative 02/20/2020    AMORPHOUS 3+ 06/01/2016       Imaging:   XR CHEST PORTABLE   Final Result     Pulmonary vascular prominence with perihilar and bibasilar asymmetric    changes are noted. Stable cardiomegaly. Findings are most consistent    with pulmonary edema. No definite focal consolidation. No large pleural    effusion noted in the lateral projection or pneumothorax. NM MYOCARDIAL SPECT REST EXERCISE OR RX    (Results Pending)         Assessment/Plan: This is a 62 y.o. female with symptoms and clinical exam consistent with Dorothye Pontiff type II hydradenitis suppurativa. We offered unroofing and surgical excision, however patient refused any surgical treatment. She is amendable to medical treatment. Started on topical clindamycin. Recommend treatment for a month. If symptoms persist, will recommend additional PO abx with/without surgical excision.      Discussed with staff      eNli Matt MD  02/21/20  2:11 PM

## 2020-02-21 NOTE — PROGRESS NOTES
CREATININE 2.1* 2.1*   GLUCOSE 187* 73     Ca/Mg/Phos:   Recent Labs     02/19/20  0631 02/20/20  0500   CALCIUM 8.8 9.1   MG 2.40 2.30     Hepatic: No results for input(s): AST, ALT, ALB, BILITOT, ALKPHOS in the last 72 hours. Troponin:   Recent Labs     02/19/20  0631 02/19/20  1308   TROPONINI 0.03* 0.07*     BNP: No results for input(s): BNP in the last 72 hours. Lipids:   Recent Labs     02/20/20  0500   CHOL 175   TRIG 35   *   LDLCALC 56   LABVLDL 7     ABGs: No results for input(s): PHART, PO2ART, ERZ8PQN in the last 72 hours. INR: No results for input(s): INR in the last 72 hours. UA:  Recent Labs     02/20/20  1200   COLORU Yellow   CLARITYU Clear   GLUCOSEU Negative   BILIRUBINUR Negative   KETUA Negative   SPECGRAV 1.020   BLOODU TRACE-INTACT*   PHUR 5.5   PROTEINU 30*   UROBILINOGEN 0.2   NITRU Negative   LEUKOCYTESUR MODERATE*   LABMICR YES   URINETYPE NotGiven      Urine Microscopic:   Recent Labs     02/20/20  1200   BACTERIA 4+*   WBCUA 11-20*   RBCUA 0-2   EPIU 2-5     Urine Culture: No results for input(s): LABURIN in the last 72 hours. Urine Chemistry: No results for input(s): Lurene Poor, PROTEINUR, NAUR in the last 72 hours. IMAGING:  XR CHEST PORTABLE   Final Result     Pulmonary vascular prominence with perihilar and bibasilar asymmetric    changes are noted. Stable cardiomegaly. Findings are most consistent    with pulmonary edema. No definite focal consolidation. No large pleural    effusion noted in the lateral projection or pneumothorax. Assessment   1. CKD 4 with vol overload     2. Hyperkalemia        3. CHF    4. HTN      5. DM      6.  Anemia       Plan  - Continue anti-hypertensives and monitor BP   - Continue IV Lasix and see response   - Low K diet   - monitor Lytes   - Monitor Uo and renal function           Thank you for allowing us to participate in care of Mari Ramon MD

## 2020-02-21 NOTE — CARE COORDINATION
Case Management Assessment           Daily Note                 Date/ Time of Note: 2/21/2020 4:42 PM         Note completed by: Abdullahi Cheney    Patient Name: Duarte Teixeira  YOB: 1960    Diagnosis:Pulmonary edema, acute, with congestive heart disease ([facility] [address] [I50.1]  Pulmonary edema, acute, with congestive heart disease ([facility] [address] [I50.1]  Patient Admission Status: Inpatient    Date of Admission:2/19/2020  6:04 AM Length of Stay: 2 GLOS:      Current Plan of Care: diuresis with IV Lasix, weaning O2, monitoring HR (chivo at night)  ________________________________________________________________________________________  PT AM-PAC:   / 24 per last evaluation on: not ordered    OT AM-PAC:   / 24 per last evaluation on: not ordered    DME Needs for discharge:   ________________________________________________________________________________________  Discharge Plan: Home    Tentative discharge date: TBD    Current barriers to discharge: continued diuresis, weaning oxygen needs, medical clearance    Referrals completed: Not Applicable    Resources/ information provided: Not indicated at this time  ________________________________________________________________________________________  Case Management Notes: CM continues to follow for discharge planning and needs. Patient continues with diuresis on IV lasix, nephro continues to follow. Patient weaning on oxygen, down currently to 2.5 liters, may need home oxygen at discharge if unable to wean. Patient continues to plan for discharge to home, [facility] following for CHF/COPD program enrollment at discharge. Jaky Brothers and her family were provided with choice of provider; she and her family are in agreement with the discharge plan.     Care Transition Patient: Peyton Cheney RN  [facility]  Case Management Department  Ph: [phone]  Fax: [phone]

## 2020-02-21 NOTE — PROGRESS NOTES
Pt returned to unit via transport.  Electronically signed by Rochelle Richter RN on 2/21/2020 at 12:08 PM

## 2020-02-21 NOTE — PROGRESS NOTES
Nephrology Consult Note                                                                                                                                                                                                                                                                                                                                                               Office : [phone]     Fax :[phone]              Patient's Name: Amanda Beltran  9:49 AM  2/21/2020    Reason for Consult:  CKD  Requesting Physician:  Jeanne Rios MD      Chief Complaint: SOB    History of Present Ilness:    Amanda Beltran is a 62 y.o. female with PMH of CKD, CHF, DM and HTN who presents with a one-day history of SOB. Pt states that she woke up yesterday morning feeling some SOB and decided to leave the house to go to [facility], where she had a large meal. Her SOB became progressively worse after this, prompting her to come in today. She was initially placed on BiPAP, and is now on 4L O2 by NC. She continues to feel SOB, however she does feel improved since admission. Pt is known to Dr. Dariela Vance, last seen in office on 01/30. No acute issues at the time were noted other than slightly high K to 4.9, low K diet was discussed with patient. Pt has baseline Cr of approximately 2.0, currently 2.1. She was hypertensive on presentation to the 160s, now normotensive. Pt is compliant with her medications. CXR done in ED showed pulmonary edema. Pt is doing well this morning. Her SOB is improving and she is down to 2.5L O2 by NC. Cr up this morning to 2.5 with Lasix use. She endorses no other symptoms.      Past Medical History:   Diagnosis Date    CHF (congestive heart failure) (HCC)     Chronic kidney disease     Diabetes mellitus ([facility] [address]     Hyperlipidemia     Hypertension     MDRO (multiple drug resistant organisms) resistance 6/3/2016    E Coli-urine       Past Surgical History:   Procedure Laterality Date    KETUA Negative   SPECGRAV 1.020   BLOODU TRACE-INTACT*   PHUR 5.5   PROTEINU 30*   UROBILINOGEN 0.2   NITRU Negative   LEUKOCYTESUR MODERATE*   LABMICR YES   Hollice Loud NotGiven      Urine Microscopic:   Recent Labs     02/20/20  1200   BACTERIA 4+*   WBCUA 11-20*   RBCUA 0-2   EPIU 2-5     Urine Culture: No results for input(s): LABURIN in the last 72 hours. Urine Chemistry: No results for input(s): Sophia Fill, PROTEINUR, NAUR in the last 72 hours. IMAGING:  XR CHEST PORTABLE   Final Result     Pulmonary vascular prominence with perihilar and bibasilar asymmetric    changes are noted. Stable cardiomegaly. Findings are most consistent    with pulmonary edema. No definite focal consolidation. No large pleural    effusion noted in the lateral projection or pneumothorax. NM MYOCARDIAL SPECT REST EXERCISE OR RX    (Results Pending)       Assessment   1. CKD  - Cr 2.5, up from 2.1    2. Hyperkalemia   - K of 5.5 on admission, now 4.5     3. CHF  - Pt presented with 2 day history of SOB  - pro-BNP of 1406 on admission, CXR showed pulmonary edema   - Trop of 0.03   - Has been on 40 IV Lasix BID, no Lasix use at home     4. HTN  - Pt hypertensive to the 160s SBP on admission, now normotensive   - Currently on Cozaar 25mg daily, Coreg 12.5mg BID     5. DM  - BG in 180s on admission   - On sliding scale insulin now     6.  Anemia   - Hgb of 7.5, baseline appears to be around 9.0     Plan  - Continue anti-hypertensives, will adjust medications as necessary   - cont diuresis as pt is overloaded   - stress test today   - ECHO 35 percent EF   - Low K diet   - monitor electroytes   - Monitor UO and renal function           Thank you for allowing us to participate in care of Adolfo Miller, MS4 acted as the scribe

## 2020-02-22 LAB
ANION GAP SERPL CALCULATED.3IONS-SCNC: 12 MMOL/L (ref 3–16)
BASOPHILS ABSOLUTE: 0 K/UL (ref 0–0.2)
BASOPHILS RELATIVE PERCENT: 0.5 %
BUN BLDV-MCNC: 53 MG/DL (ref 7–20)
CALCIUM SERPL-MCNC: 8.6 MG/DL (ref 8.3–10.6)
CHLORIDE BLD-SCNC: 104 MMOL/L (ref 99–110)
CO2: 25 MMOL/L (ref 21–32)
CREAT SERPL-MCNC: 2.6 MG/DL (ref 0.6–1.2)
EOSINOPHILS ABSOLUTE: 0.3 K/UL (ref 0–0.6)
EOSINOPHILS RELATIVE PERCENT: 3.9 %
ESTIMATED AVERAGE GLUCOSE: 137 MG/DL
GFR AFRICAN AMERICAN: 23
GFR NON-AFRICAN AMERICAN: 19
GLUCOSE BLD-MCNC: 130 MG/DL (ref 70–99)
GLUCOSE BLD-MCNC: 156 MG/DL (ref 70–99)
GLUCOSE BLD-MCNC: 175 MG/DL (ref 70–99)
GLUCOSE BLD-MCNC: 75 MG/DL (ref 70–99)
HBA1C MFR BLD: 6.4 %
HCT VFR BLD CALC: 22.5 % (ref 36–48)
HEMOGLOBIN: 7.4 G/DL (ref 12–16)
LYMPHOCYTES ABSOLUTE: 1.6 K/UL (ref 1–5.1)
LYMPHOCYTES RELATIVE PERCENT: 22.7 %
MAGNESIUM: 2.4 MG/DL (ref 1.8–2.4)
MCH RBC QN AUTO: 28.9 PG (ref 26–34)
MCHC RBC AUTO-ENTMCNC: 32.9 G/DL (ref 31–36)
MCV RBC AUTO: 87.8 FL (ref 80–100)
MONOCYTES ABSOLUTE: 0.9 K/UL (ref 0–1.3)
MONOCYTES RELATIVE PERCENT: 12.2 %
NEUTROPHILS ABSOLUTE: 4.4 K/UL (ref 1.7–7.7)
NEUTROPHILS RELATIVE PERCENT: 60.7 %
PDW BLD-RTO: 16.9 % (ref 12.4–15.4)
PERFORMED ON: ABNORMAL
PLATELET # BLD: 221 K/UL (ref 135–450)
PMV BLD AUTO: 8 FL (ref 5–10.5)
POTASSIUM SERPL-SCNC: 4.5 MMOL/L (ref 3.5–5.1)
RBC # BLD: 2.56 M/UL (ref 4–5.2)
SODIUM BLD-SCNC: 141 MMOL/L (ref 136–145)
WBC # BLD: 7.2 K/UL (ref 4–11)

## 2020-02-22 PROCEDURE — 99232 SBSQ HOSP IP/OBS MODERATE 35: CPT | Performed by: SURGERY

## 2020-02-22 PROCEDURE — 6360000002 HC RX W HCPCS: Performed by: STUDENT IN AN ORGANIZED HEALTH CARE EDUCATION/TRAINING PROGRAM

## 2020-02-22 PROCEDURE — 6370000000 HC RX 637 (ALT 250 FOR IP): Performed by: STUDENT IN AN ORGANIZED HEALTH CARE EDUCATION/TRAINING PROGRAM

## 2020-02-22 PROCEDURE — 2060000000 HC ICU INTERMEDIATE R&B

## 2020-02-22 PROCEDURE — 2580000003 HC RX 258: Performed by: STUDENT IN AN ORGANIZED HEALTH CARE EDUCATION/TRAINING PROGRAM

## 2020-02-22 PROCEDURE — 36415 COLL VENOUS BLD VENIPUNCTURE: CPT

## 2020-02-22 PROCEDURE — 80048 BASIC METABOLIC PNL TOTAL CA: CPT

## 2020-02-22 PROCEDURE — 85025 COMPLETE CBC W/AUTO DIFF WBC: CPT

## 2020-02-22 PROCEDURE — 99233 SBSQ HOSP IP/OBS HIGH 50: CPT | Performed by: INTERNAL MEDICINE

## 2020-02-22 PROCEDURE — 94640 AIRWAY INHALATION TREATMENT: CPT

## 2020-02-22 PROCEDURE — 6370000000 HC RX 637 (ALT 250 FOR IP): Performed by: SURGERY

## 2020-02-22 PROCEDURE — 94761 N-INVAS EAR/PLS OXIMETRY MLT: CPT

## 2020-02-22 PROCEDURE — 83735 ASSAY OF MAGNESIUM: CPT

## 2020-02-22 RX ORDER — NYSTATIN 100000 U/G
CREAM TOPICAL 2 TIMES DAILY
Status: DISCONTINUED | OUTPATIENT
Start: 2020-02-22 | End: 2020-02-25 | Stop reason: HOSPADM

## 2020-02-22 RX ADMIN — GABAPENTIN 800 MG: 400 CAPSULE ORAL at 09:22

## 2020-02-22 RX ADMIN — Medication 10 ML: at 09:22

## 2020-02-22 RX ADMIN — IPRATROPIUM BROMIDE AND ALBUTEROL SULFATE 1 AMPULE: .5; 3 SOLUTION RESPIRATORY (INHALATION) at 21:36

## 2020-02-22 RX ADMIN — NYSTATIN: 100000 CREAM TOPICAL at 09:21

## 2020-02-22 RX ADMIN — Medication 10 ML: at 20:11

## 2020-02-22 RX ADMIN — CARVEDILOL 12.5 MG: 12.5 TABLET, FILM COATED ORAL at 18:37

## 2020-02-22 RX ADMIN — CLINDAMYCIN PHOSPHATE: 10 SOLUTION TOPICAL at 09:23

## 2020-02-22 RX ADMIN — IPRATROPIUM BROMIDE AND ALBUTEROL SULFATE 1 AMPULE: .5; 3 SOLUTION RESPIRATORY (INHALATION) at 15:57

## 2020-02-22 RX ADMIN — CARVEDILOL 12.5 MG: 12.5 TABLET, FILM COATED ORAL at 09:22

## 2020-02-22 RX ADMIN — IPRATROPIUM BROMIDE AND ALBUTEROL SULFATE 1 AMPULE: .5; 3 SOLUTION RESPIRATORY (INHALATION) at 11:07

## 2020-02-22 RX ADMIN — GABAPENTIN 800 MG: 400 CAPSULE ORAL at 20:10

## 2020-02-22 RX ADMIN — HYDROCODONE BITARTRATE AND ACETAMINOPHEN 1 TABLET: 7.5; 325 TABLET ORAL at 00:48

## 2020-02-22 RX ADMIN — HYDROCODONE BITARTRATE AND ACETAMINOPHEN 1 TABLET: 7.5; 325 TABLET ORAL at 15:45

## 2020-02-22 RX ADMIN — FERROUS GLUCONATE TAB 324 MG (37.5 MG ELEMENTAL IRON) 324 MG: 324 (37.5 FE) TAB at 18:37

## 2020-02-22 RX ADMIN — FERROUS GLUCONATE TAB 324 MG (37.5 MG ELEMENTAL IRON) 324 MG: 324 (37.5 FE) TAB at 09:22

## 2020-02-22 RX ADMIN — PANTOPRAZOLE SODIUM 40 MG: 40 TABLET, DELAYED RELEASE ORAL at 09:22

## 2020-02-22 RX ADMIN — POLYETHYLENE GLYCOL 3350 17 G: 17 POWDER, FOR SOLUTION ORAL at 09:27

## 2020-02-22 RX ADMIN — NYSTATIN: 100000 CREAM TOPICAL at 20:11

## 2020-02-22 RX ADMIN — CLINDAMYCIN PHOSPHATE: 10 SOLUTION TOPICAL at 20:11

## 2020-02-22 RX ADMIN — ATORVASTATIN CALCIUM 40 MG: 40 TABLET, FILM COATED ORAL at 09:22

## 2020-02-22 RX ADMIN — Medication 5000 UNITS: at 09:22

## 2020-02-22 RX ADMIN — INSULIN LISPRO 1 UNITS: 100 INJECTION, SOLUTION INTRAVENOUS; SUBCUTANEOUS at 21:26

## 2020-02-22 RX ADMIN — CEFTRIAXONE 1 G: 1 INJECTION, POWDER, FOR SOLUTION INTRAMUSCULAR; INTRAVENOUS at 14:12

## 2020-02-22 RX ADMIN — HYDROCODONE BITARTRATE AND ACETAMINOPHEN 1 TABLET: 7.5; 325 TABLET ORAL at 09:36

## 2020-02-22 RX ADMIN — GABAPENTIN 800 MG: 400 CAPSULE ORAL at 14:06

## 2020-02-22 RX ADMIN — INSULIN LISPRO 2 UNITS: 100 INJECTION, SOLUTION INTRAVENOUS; SUBCUTANEOUS at 13:00

## 2020-02-22 ASSESSMENT — PAIN DESCRIPTION - PROGRESSION
CLINICAL_PROGRESSION: GRADUALLY WORSENING
CLINICAL_PROGRESSION: NOT CHANGED
CLINICAL_PROGRESSION: GRADUALLY WORSENING
CLINICAL_PROGRESSION: NOT CHANGED

## 2020-02-22 ASSESSMENT — PAIN DESCRIPTION - ONSET
ONSET: ON-GOING

## 2020-02-22 ASSESSMENT — PAIN DESCRIPTION - PAIN TYPE
TYPE: CHRONIC PAIN

## 2020-02-22 ASSESSMENT — PAIN SCALES - GENERAL
PAINLEVEL_OUTOF10: 10
PAINLEVEL_OUTOF10: 0
PAINLEVEL_OUTOF10: 10
PAINLEVEL_OUTOF10: 0
PAINLEVEL_OUTOF10: 0
PAINLEVEL_OUTOF10: 10
PAINLEVEL_OUTOF10: 0
PAINLEVEL_OUTOF10: 0
PAINLEVEL_OUTOF10: 10

## 2020-02-22 ASSESSMENT — PAIN DESCRIPTION - ORIENTATION
ORIENTATION: RIGHT;LEFT

## 2020-02-22 ASSESSMENT — PAIN DESCRIPTION - DESCRIPTORS
DESCRIPTORS: ACHING

## 2020-02-22 ASSESSMENT — PAIN DESCRIPTION - FREQUENCY
FREQUENCY: CONTINUOUS

## 2020-02-22 ASSESSMENT — PAIN DESCRIPTION - LOCATION
LOCATION: GENERALIZED;KNEE;SHOULDER
LOCATION: HIP
LOCATION: HIP

## 2020-02-22 ASSESSMENT — PAIN - FUNCTIONAL ASSESSMENT
PAIN_FUNCTIONAL_ASSESSMENT: ACTIVITIES ARE NOT PREVENTED
PAIN_FUNCTIONAL_ASSESSMENT: ACTIVITIES ARE NOT PREVENTED

## 2020-02-22 NOTE — PROGRESS NOTES
Nephrology Note                                                                                                                                                                                                                                                                                                                                                               Office : [phone]     Fax :[phone]              Patient's Name: Buck Osler  9:39 AM  2/22/2020    Reason for Consult:  CKD    Chief Complaint: SOB    History of Present Ilness:    Buck Osler is a 62 y.o. female with PMH of CKD, CHF, DM and HTN who presents with a one-day history of SOB. Pt states that she woke up yesterday morning feeling some SOB and decided to leave the house to go to [facility], where she had a large meal. Her SOB became progressively worse after this, prompting her to come in today. She was initially placed on BiPAP, and is now on 4L O2 by NC. She continues to feel SOB, however she does feel improved since admission. Pt is known to Dr. Nithin Parikh, last seen in office on 01/30. No acute issues at the time were noted other than slightly high K to 4.9, low K diet was discussed with patient. Pt has baseline Cr of approximately 2.0, currently 2.1. She was hypertensive on presentation to the 160s, now normotensive. Pt is compliant with her medications. CXR done in ED showed pulmonary edema. Interval History    Pt is doing well this morning.    Now on room air  No chest pain   Creat stable        Allergies:  Dicumarol; Percocet [oxycodone-acetaminophen]; and Warfarin and related    Current Medications:    nystatin (MYCOSTATIN) cream, BID  [Held by provider] furosemide (LASIX) injection 40 mg, BID  clindamycin (CLEOCIN T) 1 % external solution, BID  pantoprazole (PROTONIX) tablet 40 mg, QAM AC  cefTRIAXone (ROCEPHIN) 1 g IVPB in 50 mL D5W minibag, Q24H  insulin glargine (LANTUS) injection pen 25 Units, Nightly  glucose (GLUTOSE) 40 % oral gel 15 g, PRN  dextrose 50 % IV solution, PRN  glucagon (rDNA) injection 1 mg, PRN  dextrose 5 % solution, PRN  ipratropium-albuterol (DUONEB) nebulizer solution 1 ampule, Q4H WA  [Held by provider] amiodarone (CORDARONE) tablet 200 mg, Daily  atorvastatin (LIPITOR) tablet 40 mg, Daily  carvedilol (COREG) tablet 12.5 mg, BID WC  ferrous gluconate 324 (37.5 Fe) MG tablet 324 mg, BID WC  gabapentin (NEURONTIN) capsule 800 mg, TID  HYDROcodone-acetaminophen (NORCO) 7.5-325 MG per tablet 1 tablet, Q6H PRN  insulin lispro (1 Unit Dial) 0-12 Units, TID WC  insulin lispro (1 Unit Dial) 0-6 Units, Nightly  sodium chloride flush 0.9 % injection 10 mL, 2 times per day  sodium chloride flush 0.9 % injection 10 mL, PRN  acetaminophen (TYLENOL) tablet 650 mg, Q6H PRN  acetaminophen (TYLENOL) suppository 650 mg, Q6H PRN  polyethylene glycol (GLYCOLAX) packet 17 g, Daily PRN  promethazine (PHENERGAN) tablet 12.5 mg, Q6H PRN  ondansetron (ZOFRAN) injection 4 mg, Q6H PRN  perflutren lipid microspheres (DEFINITY) injection 1.65 mg, ONCE PRN  heparin (porcine) injection 5,000 Units, 3 times per day  vitamin D (CHOLECALCIFEROL) capsule 5,000 Units, Daily      Review of Systems:   14 point ROS obtained but were negative except mentioned in HPI    Physical exam:     Vitals:  BP (!) 148/50   Pulse 60   Temp 97.5 °F (36.4 °C) (Oral)   Resp 18   Ht 5' 5\" (1.651 m)   Wt 266 lb 12.8 oz (121 kg)   SpO2 95%   BMI 44.40 kg/m²   Constitutional:  OAA X3 NAD  Skin: no rash, turgor wnl  Heent:  eomi, mmm  Neck: no bruits or jvd noted  Cardiovascular:  S1, S2 reg  Respiratory: CLAB, no w/r/r  Abdomen: soft, nt, nd  Ext: trace lower extremity edema  Psychiatric: mood and affect appropriate  CNS: Patient is alert and oriented times three.      Data:   Labs:  CBC:   Recent Labs     02/20/20  0500 02/21/20  0444 02/22/20  0434   WBC 9.4 8.4 7.2   HGB 7.4* 7.5* 7.4*    211 221     BMP:    Recent Labs     02/20/20  0500 02/21/20  0444 02/22/20  0434    142 141   K 4.6 4.5 4.5    104 104   CO2 20* 23 25   BUN 38* 48* 53*   CREATININE 2.1* 2.5* 2.6*   GLUCOSE 73 87 75     Ca/Mg/Phos:   Recent Labs     02/20/20  0500 02/21/20  0444 02/22/20  0434   CALCIUM 9.1 8.9 8.6   MG 2.30 2.30 2.40     Hepatic: No results for input(s): AST, ALT, ALB, BILITOT, ALKPHOS in the last 72 hours. Troponin:   Recent Labs     02/19/20  1308   TROPONINI 0.07*     BNP: No results for input(s): BNP in the last 72 hours. Lipids:   Recent Labs     02/20/20  0500   CHOL 175   TRIG 35   *   LDLCALC 56   LABVLDL 7     ABGs: No results for input(s): PHART, PO2ART, TBQ2YGB in the last 72 hours. INR: No results for input(s): INR in the last 72 hours. UA:  Recent Labs     02/20/20  1200   COLORU Yellow   CLARITYU Clear   GLUCOSEU Negative   BILIRUBINUR Negative   KETUA Negative   SPECGRAV 1.020   BLOODU TRACE-INTACT*   PHUR 5.5   PROTEINU 30*   UROBILINOGEN 0.2   NITRU Negative   LEUKOCYTESUR MODERATE*   LABMICR YES   URINETYPE NotGiven      Urine Microscopic:   Recent Labs     02/20/20  1200   BACTERIA 4+*   WBCUA 11-20*   RBCUA 0-2   EPIU 2-5     Urine Culture: No results for input(s): LABURIN in the last 72 hours. Urine Chemistry: No results for input(s): Serene Travon, PROTEINUR, NAUR in the last 72 hours. IMAGING:  NM MYOCARDIAL SPECT REST EXERCISE OR RX   Final Result      XR CHEST PORTABLE   Final Result     Pulmonary vascular prominence with perihilar and bibasilar asymmetric    changes are noted. Stable cardiomegaly. Findings are most consistent    with pulmonary edema. No definite focal consolidation. No large pleural    effusion noted in the lateral projection or pneumothorax. Assessment   1. LAURIE on CKD 4  - BC 1.9-2.1   - Cr  2.1->2.5->2.6    2. Electrolytes  - no dyskalemia    3.  CHF  - pro-BNP of 1406 on admission, CXR showed pulmonary edema   - ECHO 35 percent EF   - Has been on 40 IV Lasix BID, no Lasix use at home   - in negative fluid balance. Wt trending down. 4. HTN  - Pt hypertensive to the 160s SBP on admission, now normotensive   - Currently on Cozaar 25mg daily, Coreg 12.5mg BID     5. DM  - BG in 180s on admission   - On sliding scale insulin now     6.  Anemia   - anemia of CKD    Plan  - Continue anti-hypertensives, will adjust medications as necessary   - cont diuresis   - Low K diet   - monitor electroytes   - Monitor UO and renal function   - Dose meds to current GFR    Thank you for allowing us to participate in care of Jaclyn Reyes  2/22/2020    [facility]  Office : [phone]  Fax :[phone]

## 2020-02-22 NOTE — PLAN OF CARE
Problem: Pain:  Goal: Pain level will decrease  Description  Pain level will decrease  2/22/2020 0214 by Chong Baum RN  Outcome: Ongoing  Note:   Has ongoing arthritic pain rated 10/10 that is generalized and worse in their shoulders and knees that is managed with PRN Norco.      Problem: Falls - Risk of:  Goal: Will remain free from falls  Description  Will remain free from falls  2/22/2020 0214 by Chong Baum RN  Outcome: Ongoing  Note:   Bed alarm engaged; bed in lowest position; non-skid footwear on; pt uses call light appropriately; has been free of falls throughout shift; will continue to monitor. Goal: Absence of physical injury  Description  Absence of physical injury  Outcome: Ongoing  Note:   Pt has been free of new injuries throughout shift; will continue to monitor. Problem: Risk for Impaired Skin Integrity  Goal: Tissue integrity - skin and mucous membranes  Description  Structural intactness and normal physiological function of skin and  mucous membranes. Outcome: Ongoing  Note:   Pt has some redness in their penny area, pt did not want me to assess the area at this time; has redness and maladorous areas under breasts; physician notified for nystatin cream; will continue to monitor. Problem: Gas Exchange - Impaired:  Goal: Levels of oxygenation will improve  Description  Levels of oxygenation will improve  Outcome: Ongoing  Note:   Pt has maintained Sp02 greater than 90% throughout shift; lung sounds clear/diminished on auscultation; will continue to monitor.

## 2020-02-22 NOTE — PROGRESS NOTES
[address] Daily Progress Note      Admit Date:  2/19/2020    Subjective:  Ms. Domi Gibbs was seen and examined. F/U CHF/Trop/RF/HTN. Breathing is much better. No chest pain.      Objective:   BP (!) 137/45   Pulse 58   Temp 98.6 °F (37 °C) (Oral)   Resp 16   Ht 5' 5\" (1.651 m)   Wt 266 lb 12.8 oz (121 kg)   SpO2 95%   BMI 44.40 kg/m²       Intake/Output Summary (Last 24 hours) at 2/22/2020 2099  Last data filed at 2/21/2020 2302  Gross per 24 hour   Intake 1338 ml   Output 1825 ml   Net -487 ml       TELEMETRY: Sinus     Physical Exam:  General:  Awake, alert, NAD  Skin:  Warm and dry  Neck:  JVP difficult  Chest:  Decreased BS,  respiration normal  Cardiovascular:  RRR S1S2  Abdomen:  Soft nontender  Extremities:  TR edema    Medications:    nystatin   Topical BID    furosemide  40 mg Intravenous BID    clindamycin   Topical BID    pantoprazole  40 mg Oral QAM AC    cefTRIAXone (ROCEPHIN) IV  1 g Intravenous Q24H    insulin glargine  25 Units Subcutaneous Nightly    ipratropium-albuterol  1 ampule Inhalation Q4H WA    [Held by provider] amiodarone  200 mg Oral Daily    atorvastatin  40 mg Oral Daily    carvedilol  12.5 mg Oral BID WC    ferrous gluconate  324 mg Oral BID WC    gabapentin  800 mg Oral TID    insulin lispro  0-12 Units Subcutaneous TID WC    insulin lispro  0-6 Units Subcutaneous Nightly    sodium chloride flush  10 mL Intravenous 2 times per day    heparin (porcine)  5,000 Units Subcutaneous 3 times per day    vitamin D  5,000 Units Oral Daily      dextrose       glucose, dextrose, glucagon (rDNA), dextrose, HYDROcodone-acetaminophen, sodium chloride flush, acetaminophen, acetaminophen, polyethylene glycol, promethazine, ondansetron, perflutren lipid microspheres    Lab Data:  CBC:   Recent Labs     02/20/20  0500 02/21/20  0444 02/22/20  0434   WBC 9.4 8.4 7.2   HGB 7.4* 7.5* 7.4*   HCT 22.9* 23.2* 22.5*   MCV 88.6 88.1 87.8    211 221     BMP:   Recent Labs 02/20/20  0500 02/21/20  0444 02/22/20  0434    142 141   K 4.6 4.5 4.5    104 104   CO2 20* 23 25   BUN 38* 48* 53*   CREATININE 2.1* 2.5* 2.6*     LIVER PROFILE: No results for input(s): AST, ALT, LIPASE, BILIDIR, BILITOT, ALKPHOS in the last 72 hours. Invalid input(s): AMYLASE,  ALB  PT/INR: No results for input(s): PROTIME, INR in the last 72 hours. APTT: No results for input(s): APTT in the last 72 hours. BNP:  No results for input(s): BNP in the last 72 hours. IMAGING:     Assessment:  Patient Active Problem List    Diagnosis Date Noted    Anemia 05/24/2017     Priority: Low    On amiodarone therapy 09/11/2016     Priority: Low    Hyperlipidemia 09/11/2016     Priority: Low    Colitis presumed infectious      Priority: Low    Persistent atrial fibrillation 06/01/2016     Priority: Low    Sepsis ([facility] [address] 06/01/2016     Priority: Low    Acute renal failure (HCC)      Priority: Low    Non-intractable vomiting with nausea      Priority: Low    Lactic acidemia      Priority: Low    Leukocytosis      Priority: Low    DKA (diabetic ketoacidoses) ([facility] [address]      Priority: Low    Dilated cardiomyopathy ([facility] [address] 02/20/2020    Pulmonary edema, acute, with congestive heart disease ([facility] [address] 02/19/2020    Elevated troponin 02/19/2020    Essential hypertension 02/19/2020    Pneumonia, interstitial ([facility] [address] 04/07/2019    Acute renal failure superimposed on chronic kidney disease ([facility] [address] 04/07/2019    Uncontrolled type 2 diabetes mellitus with hyperglycemia ([facility] [address] 04/07/2019    Acute on chronic diastolic congestive heart failure ([facility] [address] 04/04/2019    Leg swelling 03/12/2019    Venous insufficiency 03/12/2019    Chronic venous htn w inflammation of bilateral low extrm 03/12/2019     Imp: CHF/trop/RF/HTN/cardiomyop    Plan:  Breathing reasonable. Cr slightly higher. Resting myoview images today. Watch cr. Wean O2. BP better overall.        Core Measures:  · Discharge instructions:   · LVEF documented: · ACEI for LV dysfunction:   · Smoking Cessation:    Kishore Wolf MD 2/22/2020 7:21 AM

## 2020-02-22 NOTE — PROGRESS NOTES
General Surgery Daily Progress Note    CC: L axillary hydradenitis    SUBJECTIVE:  No acute events overnight. Patient has no complaints this AM.    ROS:   A 14 point review of systems was conducted, significant findings as noted above. All other systems negative. OBJECTIVE:    PHYSICAL EXAM:  Vitals:    02/22/20 0017 02/22/20 0050 02/22/20 0406 02/22/20 0611   BP:  (!) 150/50 (!) 137/45    Pulse: 58 55 58    Resp:  18 16    Temp:  98.1 °F (36.7 °C) 98.6 °F (37 °C)    TempSrc:  Oral Oral    SpO2:  93% 95%    Weight:    266 lb 12.8 oz (121 kg)   Height:           General appearance: alert, no acute distress, grooming appropriate  Eyes: PERRLA, no scleral icterus  Neck: trachea midline, no JVD, no lymphadenopathy  Chest/Lungs: CTAB, no crackles/rales, wheezes/rhonchi, normal effort  Cardiovascular: RRR, no murmurs/gallops/rubs  Abdomen: soft, non-tender, non-distended, +BS, no guarding/rigidity  Skin: warm and dry, no rashes, L axilla mass with opening tract noted, non tender, no induration. Multiple sinus tract noted. R axilla with multiple scarring, no fluid collection. BL inframamary fold without fluid collection or sinus tract.  Refused groin exam.  Extremities: no edema, no cyanosis  Neuro: A&Ox3, no focal deficits, sensation intact      ASSESSMENT & PLAN:   This is a 62 y.o. female with a diagnosis of left axillary hydradenitis    - discussed with patient punch debridement vs topical antibiotics    - patient opted for medical management    - continue topical clinda  - continue IS and deep breathing  - continue OOB and ambulation  - continue medical management per primary  - surgery to sign off at this time; please call with questions    Jessi Pickens DO, PGY-1  02/22/20  7:30 AM  [phone]

## 2020-02-22 NOTE — PROGRESS NOTES
02/21/20  0444 02/22/20  0434    142 141   K 4.6 4.5 4.5    104 104   CO2 20* 23 25   BUN 38* 48* 53*   CREATININE 2.1* 2.5* 2.6*   GLUCOSE 73 87 75   CALCIUM 9.1 8.9 8.6   MG 2.30 2.30 2.40   ANIONGAP 16 15 12     Hepatic: No results for input(s): AST, ALT, BILITOT, BILIDIR, PROT, LABALBU, ALKPHOS in the last 72 hours. Troponin:   Recent Labs     02/19/20  1308   TROPONINI 0.07*     BNP: No results for input(s): BNP in the last 72 hours. Lipids:   Recent Labs     02/20/20  0500   CHOL 175   *     ABGs:  No results for input(s): PHART, PBV3MZI, PO2ART, IUW2BRG, BEART, THGBART, Q1ZWHKRP, KAP8CRI in the last 72 hours. INR: No results for input(s): INR in the last 72 hours. Lactate: No results for input(s): LACTATE in the last 72 hours. Cultures:  -----------------------------------------------------------------  RAD:   NM MYOCARDIAL SPECT REST EXERCISE OR RX   Final Result      XR CHEST PORTABLE   Final Result     Pulmonary vascular prominence with perihilar and bibasilar asymmetric    changes are noted. Stable cardiomegaly. Findings are most consistent    with pulmonary edema. No definite focal consolidation. No large pleural    effusion noted in the lateral projection or pneumothorax. Assessment/Plan:   Elissa Dawn a 62 y.o. female with chronic systolic  heart failure.        Acute hypoxic, OMBTBHCURYU MNYLJENIOWH PGYKJFR 2/8 to diastolic CHF exacerbation  -- In setting of pickwickian syndrome   -- Furosemide injection 40 mg BID- held for today   -- Echo (last echo done in 2016, EF 55-60%); Consult Cardiology.  --Echo on 2/19/20 35-40% with L ventricular hypokinesis diffuse.   -- Daily Weights, strict I&O   -- Heart failure nurse   --Cardiology consulted appreciate recommendations   -Resting Myoview      History of smoking with possible undiagnosed COPD & SHAQ & OHS   - Wheezing on lung exam   - No PFT on file   - Sleep consult as outpatient   - Pulmonary consult as

## 2020-02-22 NOTE — PLAN OF CARE
P  Problem: Falls - Risk of:  Goal: Will remain free from falls  Description  Will remain free from falls  2/22/2020 1423 by King Chelo RN  Note:   Call light in reach at all times  bed low with wheels locked  bed alarm on  nonskid socks when OOB  continue fall prevention measures     Problem: Risk for Impaired Skin Integrity  Goal: Tissue integrity - skin and mucous membranes  Description  Structural intactness and normal physiological function of skin and  mucous membranes. 2/22/2020 1423 by King Chelo RN  Note:   Tissue integrity maintained  patient frequently repositions  self  continue asess     Problem: Risk for Impaired Skin Integrity  Goal: Tissue integrity - skin and mucous membranes  Description  Structural intactness and normal physiological function of skin and  mucous membranes.   2/22/2020 1423 by King Chelo RN  Note:   Tissue integrity maintained  patient frequently repositions  self  continue asess     Problem: Gas Exchange - Impaired:  Goal: Levels of oxygenation will improve  Description  Levels of oxygenation will improve  2/22/2020 1423 by King Chelo RN  Outcome: Ongoing     Problem: Pain:  Goal: Control of chronic pain  Description  Control of chronic pain  Note:   Pt requesting norco every 6 hr for chronic 'joint pain\"    pt reports no pain reduction with pain medicine  continue assess pain     Problem: Respiratory  Goal: Supplemental O2 requirements decreased  Note:   Room air pulse ox 92-95%  slight shortness breath with exertion  continue monitor pulse ox     Problem: OXYGENATION/RESPIRATORY FUNCTION  Goal: Patient will maintain patent airway  Note:   Lungs CTA diminished bibasilar  room air  no peripheral edema  NSR/SB tele rhythm  SIERRA  continue I/O, daily metabolic panel, daily weight        Problem: Pain:  Goal: Pain level will decrease  Description  Pain level will decrease  2/22/2020 0214 by Kaylee Sierra RN  Outcome: Ongoing  Note:   Has ongoing arthritic pain in their shoulders and knees that is managed with PRN Norco.

## 2020-02-22 NOTE — PROGRESS NOTES
Pattern: Regular Pattern; RR 8-20 = 0    Breath Sounds: Diminished unilaterally = 1    Sputum   ,  , Sputum How Obtained: Spontaneous cough  Cough: Strong, spontaneous, non-productive = 0    Vital Signs   BP (!) 148/50   Pulse 60   Temp 97.5 °F (36.4 °C) (Oral)   Resp 18   Ht 5' 5\" (1.651 m)   Wt 266 lb 12.8 oz (121 kg)   SpO2 95%   BMI 44.40 kg/m²   SPO2 (COPD values may differ): 90-91% on room air or greater than 92% on FiO2 24- 28% = 1    Peak Flow (asthma only): not applicable = 0    RSI: 91-14 = Q4WA (every four hours while awake) and Q4hrs PRN        Plan       Goals: medication delivery, mobilize retained secretions, volume expansion and improve oxygenation    Patient/caregiver was educated on the proper method of use for Respiratory Care Devices:  Yes      Level of patient/caregiver understanding able to:   ? Verbalize understanding   ? Demonstrate understanding       ? Teach back        ? Needs reinforcement       ? No available caregiver               ? Other:     Response to education:  Good     Is patient being placed on Home Treatment Regimen? No     Does the patient have everything they need prior to discharge? NA     Comments: pt has had occasional wheezing    Plan of Care: continue current therapy    Electronically signed by Griselda Roberts RCP on 2/22/2020 at 9:10 AM    Respiratory Protocol Guidelines     1. Assessment and treatment by Respiratory Therapy will be initiated for medication and therapeutic interventions upon initiation of aerosolized medication. 2. Physician will be contacted for respiratory rate (RR) greater than 35 breaths per minute. Therapy will be held for heart rate (HR) greater than 140 beats per minute, pending direction from physician. 3. Bronchodilators will be administered via Metered Dose Inhaler (MDI) with spacer when the following criteria are met:  a.  Alert and cooperative     b. HR < 140 bpm  c. RR < 30 bpm                d. Can demonstrate a 2-3 second

## 2020-02-23 LAB
ANION GAP SERPL CALCULATED.3IONS-SCNC: 16 MMOL/L (ref 3–16)
BASOPHILS ABSOLUTE: 0 K/UL (ref 0–0.2)
BASOPHILS RELATIVE PERCENT: 0.5 %
BUN BLDV-MCNC: 50 MG/DL (ref 7–20)
CALCIUM SERPL-MCNC: 8.9 MG/DL (ref 8.3–10.6)
CHLORIDE BLD-SCNC: 103 MMOL/L (ref 99–110)
CO2: 23 MMOL/L (ref 21–32)
CREAT SERPL-MCNC: 2.4 MG/DL (ref 0.6–1.2)
EOSINOPHILS ABSOLUTE: 0.5 K/UL (ref 0–0.6)
EOSINOPHILS RELATIVE PERCENT: 5.6 %
GFR AFRICAN AMERICAN: 25
GFR NON-AFRICAN AMERICAN: 20
GLUCOSE BLD-MCNC: 131 MG/DL (ref 70–99)
GLUCOSE BLD-MCNC: 152 MG/DL (ref 70–99)
GLUCOSE BLD-MCNC: 161 MG/DL (ref 70–99)
GLUCOSE BLD-MCNC: 84 MG/DL (ref 70–99)
GLUCOSE BLD-MCNC: 88 MG/DL (ref 70–99)
HCT VFR BLD CALC: 26.5 % (ref 36–48)
HEMOGLOBIN: 8.3 G/DL (ref 12–16)
LYMPHOCYTES ABSOLUTE: 1.5 K/UL (ref 1–5.1)
LYMPHOCYTES RELATIVE PERCENT: 18 %
MAGNESIUM: 2.6 MG/DL (ref 1.8–2.4)
MCH RBC QN AUTO: 27.7 PG (ref 26–34)
MCHC RBC AUTO-ENTMCNC: 31.4 G/DL (ref 31–36)
MCV RBC AUTO: 88.3 FL (ref 80–100)
MONOCYTES ABSOLUTE: 0.9 K/UL (ref 0–1.3)
MONOCYTES RELATIVE PERCENT: 10.2 %
NEUTROPHILS ABSOLUTE: 5.6 K/UL (ref 1.7–7.7)
NEUTROPHILS RELATIVE PERCENT: 65.7 %
PDW BLD-RTO: 17.4 % (ref 12.4–15.4)
PERFORMED ON: ABNORMAL
PERFORMED ON: NORMAL
PLATELET # BLD: 285 K/UL (ref 135–450)
PMV BLD AUTO: 7.4 FL (ref 5–10.5)
POTASSIUM SERPL-SCNC: 4.9 MMOL/L (ref 3.5–5.1)
RBC # BLD: 3 M/UL (ref 4–5.2)
SODIUM BLD-SCNC: 142 MMOL/L (ref 136–145)
WBC # BLD: 8.6 K/UL (ref 4–11)

## 2020-02-23 PROCEDURE — 6370000000 HC RX 637 (ALT 250 FOR IP): Performed by: STUDENT IN AN ORGANIZED HEALTH CARE EDUCATION/TRAINING PROGRAM

## 2020-02-23 PROCEDURE — 83735 ASSAY OF MAGNESIUM: CPT

## 2020-02-23 PROCEDURE — 36415 COLL VENOUS BLD VENIPUNCTURE: CPT

## 2020-02-23 PROCEDURE — 99233 SBSQ HOSP IP/OBS HIGH 50: CPT | Performed by: INTERNAL MEDICINE

## 2020-02-23 PROCEDURE — 2580000003 HC RX 258: Performed by: STUDENT IN AN ORGANIZED HEALTH CARE EDUCATION/TRAINING PROGRAM

## 2020-02-23 PROCEDURE — 85025 COMPLETE CBC W/AUTO DIFF WBC: CPT

## 2020-02-23 PROCEDURE — 80048 BASIC METABOLIC PNL TOTAL CA: CPT

## 2020-02-23 PROCEDURE — 2060000000 HC ICU INTERMEDIATE R&B

## 2020-02-23 PROCEDURE — 6360000002 HC RX W HCPCS: Performed by: STUDENT IN AN ORGANIZED HEALTH CARE EDUCATION/TRAINING PROGRAM

## 2020-02-23 PROCEDURE — 6370000000 HC RX 637 (ALT 250 FOR IP): Performed by: HOSPITALIST

## 2020-02-23 RX ORDER — IPRATROPIUM BROMIDE AND ALBUTEROL SULFATE 2.5; .5 MG/3ML; MG/3ML
1 SOLUTION RESPIRATORY (INHALATION) EVERY 4 HOURS PRN
Status: DISCONTINUED | OUTPATIENT
Start: 2020-02-23 | End: 2020-02-25 | Stop reason: HOSPADM

## 2020-02-23 RX ORDER — FUROSEMIDE 40 MG/1
40 TABLET ORAL DAILY
Status: DISCONTINUED | OUTPATIENT
Start: 2020-02-23 | End: 2020-02-24

## 2020-02-23 RX ADMIN — HYDROCODONE BITARTRATE AND ACETAMINOPHEN 1 TABLET: 7.5; 325 TABLET ORAL at 05:09

## 2020-02-23 RX ADMIN — PANTOPRAZOLE SODIUM 40 MG: 40 TABLET, DELAYED RELEASE ORAL at 05:09

## 2020-02-23 RX ADMIN — Medication 5000 UNITS: at 09:33

## 2020-02-23 RX ADMIN — GABAPENTIN 800 MG: 400 CAPSULE ORAL at 21:25

## 2020-02-23 RX ADMIN — Medication 10 ML: at 09:33

## 2020-02-23 RX ADMIN — GABAPENTIN 800 MG: 400 CAPSULE ORAL at 09:32

## 2020-02-23 RX ADMIN — FERROUS GLUCONATE TAB 324 MG (37.5 MG ELEMENTAL IRON) 324 MG: 324 (37.5 FE) TAB at 16:39

## 2020-02-23 RX ADMIN — IRON SUCROSE 300 MG: 20 INJECTION, SOLUTION INTRAVENOUS at 11:41

## 2020-02-23 RX ADMIN — CARVEDILOL 12.5 MG: 12.5 TABLET, FILM COATED ORAL at 16:39

## 2020-02-23 RX ADMIN — NYSTATIN: 100000 CREAM TOPICAL at 21:26

## 2020-02-23 RX ADMIN — POLYETHYLENE GLYCOL 3350 17 G: 17 POWDER, FOR SOLUTION ORAL at 11:41

## 2020-02-23 RX ADMIN — ATORVASTATIN CALCIUM 40 MG: 40 TABLET, FILM COATED ORAL at 09:33

## 2020-02-23 RX ADMIN — GABAPENTIN 800 MG: 400 CAPSULE ORAL at 14:59

## 2020-02-23 RX ADMIN — FUROSEMIDE 40 MG: 40 TABLET ORAL at 12:37

## 2020-02-23 RX ADMIN — INSULIN LISPRO 1 UNITS: 100 INJECTION, SOLUTION INTRAVENOUS; SUBCUTANEOUS at 21:29

## 2020-02-23 RX ADMIN — FERROUS GLUCONATE TAB 324 MG (37.5 MG ELEMENTAL IRON) 324 MG: 324 (37.5 FE) TAB at 11:41

## 2020-02-23 RX ADMIN — HYDROCODONE BITARTRATE AND ACETAMINOPHEN 1 TABLET: 7.5; 325 TABLET ORAL at 12:00

## 2020-02-23 RX ADMIN — CLINDAMYCIN PHOSPHATE: 10 SOLUTION TOPICAL at 09:33

## 2020-02-23 RX ADMIN — CLINDAMYCIN PHOSPHATE: 10 SOLUTION TOPICAL at 21:25

## 2020-02-23 RX ADMIN — CARVEDILOL 12.5 MG: 12.5 TABLET, FILM COATED ORAL at 09:32

## 2020-02-23 RX ADMIN — NYSTATIN: 100000 CREAM TOPICAL at 09:33

## 2020-02-23 RX ADMIN — CEFTRIAXONE 1 G: 1 INJECTION, POWDER, FOR SOLUTION INTRAMUSCULAR; INTRAVENOUS at 14:59

## 2020-02-23 RX ADMIN — Medication 10 ML: at 21:25

## 2020-02-23 RX ADMIN — INSULIN LISPRO 2 UNITS: 100 INJECTION, SOLUTION INTRAVENOUS; SUBCUTANEOUS at 12:37

## 2020-02-23 RX ADMIN — HYDROCODONE BITARTRATE AND ACETAMINOPHEN 1 TABLET: 7.5; 325 TABLET ORAL at 19:02

## 2020-02-23 ASSESSMENT — PAIN SCALES - GENERAL
PAINLEVEL_OUTOF10: 6
PAINLEVEL_OUTOF10: 10
PAINLEVEL_OUTOF10: 10
PAINLEVEL_OUTOF10: 8
PAINLEVEL_OUTOF10: 10
PAINLEVEL_OUTOF10: 10
PAINLEVEL_OUTOF10: 7
PAINLEVEL_OUTOF10: 10

## 2020-02-23 ASSESSMENT — PAIN DESCRIPTION - LOCATION
LOCATION: KNEE;HIP
LOCATION: HIP
LOCATION: HIP;KNEE
LOCATION: HIP

## 2020-02-23 ASSESSMENT — PAIN DESCRIPTION - ONSET
ONSET: ON-GOING

## 2020-02-23 ASSESSMENT — PAIN DESCRIPTION - ORIENTATION
ORIENTATION: LEFT;RIGHT

## 2020-02-23 ASSESSMENT — PAIN DESCRIPTION - DESCRIPTORS
DESCRIPTORS: ACHING

## 2020-02-23 ASSESSMENT — PAIN DESCRIPTION - FREQUENCY
FREQUENCY: CONTINUOUS

## 2020-02-23 ASSESSMENT — PAIN - FUNCTIONAL ASSESSMENT
PAIN_FUNCTIONAL_ASSESSMENT: ACTIVITIES ARE NOT PREVENTED

## 2020-02-23 ASSESSMENT — PAIN DESCRIPTION - PROGRESSION
CLINICAL_PROGRESSION: NOT CHANGED

## 2020-02-23 ASSESSMENT — PAIN DESCRIPTION - PAIN TYPE
TYPE: CHRONIC PAIN

## 2020-02-23 NOTE — PLAN OF CARE
No falls this shift, bed in lowest position, non skid socks on, call light within reach, hourly checks, safety maintained, will continue to monitor.

## 2020-02-23 NOTE — PROGRESS NOTES
g IVPB in 50 mL D5W minibag, Q24H  insulin glargine (LANTUS) injection pen 25 Units, Nightly  glucose (GLUTOSE) 40 % oral gel 15 g, PRN  dextrose 50 % IV solution, PRN  glucagon (rDNA) injection 1 mg, PRN  dextrose 5 % solution, PRN  [Held by provider] amiodarone (CORDARONE) tablet 200 mg, Daily  atorvastatin (LIPITOR) tablet 40 mg, Daily  carvedilol (COREG) tablet 12.5 mg, BID WC  ferrous gluconate 324 (37.5 Fe) MG tablet 324 mg, BID WC  gabapentin (NEURONTIN) capsule 800 mg, TID  HYDROcodone-acetaminophen (NORCO) 7.5-325 MG per tablet 1 tablet, Q6H PRN  insulin lispro (1 Unit Dial) 0-12 Units, TID WC  insulin lispro (1 Unit Dial) 0-6 Units, Nightly  sodium chloride flush 0.9 % injection 10 mL, 2 times per day  sodium chloride flush 0.9 % injection 10 mL, PRN  acetaminophen (TYLENOL) tablet 650 mg, Q6H PRN  acetaminophen (TYLENOL) suppository 650 mg, Q6H PRN  polyethylene glycol (GLYCOLAX) packet 17 g, Daily PRN  promethazine (PHENERGAN) tablet 12.5 mg, Q6H PRN  ondansetron (ZOFRAN) injection 4 mg, Q6H PRN  perflutren lipid microspheres (DEFINITY) injection 1.65 mg, ONCE PRN  heparin (porcine) injection 5,000 Units, 3 times per day  vitamin D (CHOLECALCIFEROL) capsule 5,000 Units, Daily      Physical exam:     Vitals:  /67   Pulse 68   Temp 98.3 °F (36.8 °C) (Oral)   Resp 16   Ht 5' 5\" (1.651 m)   Wt 265 lb 1.6 oz (120.2 kg)   SpO2 91%   BMI 44.11 kg/m²   Constitutional:  OAA X3 NAD, obese  Skin: no rash, turgor wnl  Heent:  eomi, mmm  Neck: no bruits or jvd noted  Cardiovascular:  S1, S2 reg  Respiratory: CLAB, no w/r/r  Abdomen: soft, nt, nd  Ext: trace lower extremity edema  CNS: Patient is alert and oriented     Data:   Labs:  CBC:   Recent Labs     02/21/20  0444 02/22/20  0434 02/23/20  0806   WBC 8.4 7.2 8.6   HGB 7.5* 7.4* 8.3*    221 285     BMP:    Recent Labs     02/21/20 0444 02/22/20  0434 02/23/20  0806    141 142   K 4.5 4.5 4.9    104 103   CO2 23 25 23   BUN 48* 53* 50*   CREATININE 2.5* 2.6* 2.4*   GLUCOSE 87 75 84     Ca/Mg/Phos:   Recent Labs     02/21/20  0444 02/22/20  0434 02/23/20  0806   CALCIUM 8.9 8.6 8.9   MG 2.30 2.40 2.60*     Hepatic: No results for input(s): AST, ALT, ALB, BILITOT, ALKPHOS in the last 72 hours. Troponin:   No results for input(s): TROPONINI in the last 72 hours. BNP: No results for input(s): BNP in the last 72 hours. Lipids:   No results for input(s): CHOL, TRIG, HDL, LDLCALC, LABVLDL in the last 72 hours. ABGs: No results for input(s): PHART, PO2ART, YVV2KJA in the last 72 hours. INR: No results for input(s): INR in the last 72 hours. UA:  Recent Labs     02/20/20  1200   COLORU Yellow   CLARITYU Clear   GLUCOSEU Negative   BILIRUBINUR Negative   KETUA Negative   SPECGRAV 1.020   BLOODU TRACE-INTACT*   PHUR 5.5   PROTEINU 30*   UROBILINOGEN 0.2   NITRU Negative   LEUKOCYTESUR MODERATE*   LABMICR YES   URINETYPE NotGiven      Urine Microscopic:   Recent Labs     02/20/20  1200   BACTERIA 4+*   WBCUA 11-20*   RBCUA 0-2   EPIU 2-5     Urine Culture: No results for input(s): LABURIN in the last 72 hours. Urine Chemistry: No results for input(s): [place], PROTEINUR, NAUR in the last 72 hours. IMAGING:  NM MYOCARDIAL SPECT REST EXERCISE OR RX   Final Result      XR CHEST PORTABLE   Final Result     Pulmonary vascular prominence with perihilar and bibasilar asymmetric    changes are noted. Stable cardiomegaly. Findings are most consistent    with pulmonary edema. No definite focal consolidation. No large pleural    effusion noted in the lateral projection or pneumothorax. Assessment   1. LAURIE on CKD 4  - BC 1.9-2.1   - Cr  2.1->2.5->2.6->2.4    2. Electrolytes  - no dyskalemia    3. CHF  - pro-BNP of 1406 on admission, CXR showed pulmonary edema   - ECHO 35 percent EF   - no Lasix use at home   - in negative fluid balance. Wt trending down. 4.  HTN  - Pt hypertensive to the 160s SBP on admission, now normotensive   -

## 2020-02-23 NOTE — PROGRESS NOTES
RESPIRATORY THERAPY ASSESSMENT    Name:  82 Michelle Shahid Record Number:  [id number]  Age: 62 y.o. Gender: female  : 1960  Today's Date:  2020  Room:  [address] [place], [place] [address]-    Assessment     Is the patient being admitted for a COPD or Asthma exacerbation? No   (If yes the patient will be seen every 4 hours for the first 24 hours and then reassessed)    Patient Admission Diagnosis      Allergies  Allergies   Allergen Reactions    Dicumarol      Other    Percocet [Oxycodone-Acetaminophen] Itching    Warfarin And Related Hives     Other         Minimum Predicted Vital Capacity:     850          Actual Vital Capacity:      1000              Pulmonary History:No history  Home Oxygen Therapy:  room air  Home Respiratory Therapy:None   Current Respiratory Therapy:  Douneb Qid  Treatment Type: HHN  Medications: Albuterol/Ipratropium    Respiratory Severity Index(RSI)   Patients with orders for inhalation medications, oxygen, or any therapeutic treatment modality will be placed on Respiratory Protocol. They will be assessed with the first treatment and at least every 72 hours thereafter. The following severity scale will be used to determine frequency of treatment intervention.     Smoking History: Pulmonary Disease or Smoking History, Greater than 15 pack year = 2    Social History  Social History     Tobacco Use    Smoking status: Former Smoker     Types: Cigarettes     Last attempt to quit: 2016     Years since quitting: 3.1    Smokeless tobacco: Never Used   Substance Use Topics    Alcohol use: Yes     Comment: about 1 drink weekly    Drug use: No       Recent Surgical History: None = 0  Past Surgical History  Past Surgical History:   Procedure Laterality Date    COLONOSCOPY      ENDOSCOPY, COLON, DIAGNOSTIC      TUBAL LIGATION         Level of Consciousness: Alert, Oriented, and Cooperative = 0    Level of Activity: Walking unassisted = 0    Respiratory Pattern: Regular Pattern; RR 8-20 = hold  4. Bronchodilators will be administered via Hand Held Nebulizer [facility]) to patients when ANY of the following criteria are met  a. Incognizant or uncooperative          b. Patients treated with HHN at Home        c. Unable to demonstrate proper use of MDI with spacer     d. RR > 30 bpm   5. Bronchodilators will be delivered via Metered Dose Inhaler (MDI), HHN, Aerogen to intubated patients on mechanical ventilation. 6. Inhalation medication orders will be delivered and/or substituted as outlined below. Aerosolized Medications Ordering and Administration Guidelines:    1. All Medications will be ordered by a physician, and their frequency and/or modality will be adjusted as defined by the patients Respiratory Severity Index (RSI) score. 2. If the patient does not have documented COPD, consider discontinuing anticholinergics when RSI is less than 9.  3. If the bronchospasm worsens (increased RSI), then the bronchodilator frequency can be increased to a maximum of every 4 hours. If greater than every 4 hours is required, the physician will be contacted. 4. If the bronchospasm improves, the frequency of the bronchodilator can be decreased, based on the patient's RSI, but not less than home treatment regimen frequency. 5. Bronchodilator(s) will be discontinued if patient has a RSI less than 9 and has received no scheduled or as needed treatment for 72  Hrs. Patients Ordered on a Mucolytic Agent:    1. Must always be administered with a bronchodilator. 2. Discontinue if patient experiences worsened bronchospasm, or secretions have lessened to the point that the patient is able to clear them with a cough. Anti-inflammatory and Combination Medications:    1. If the patient lacks prior history of lung disease, is not using inhaled anti-inflammatory medication at home, and lacks wheezing by examination or by history for at least 24 hours, contact physician for possible discontinuation.

## 2020-02-23 NOTE — PROGRESS NOTES
Progress Note    Admit Date: 2/19/2020  Day: 4  Diet: DIET RENAL; Carb Control: 4 carb choices (60 gms)/meal    CC:SOB     Interval history: The patient was seen at bedside this morning. She was very tearful because she didn't understand the plan. Spoke with her extensively about the plan and she agreed with waiting for official lexiscan read and allowing kidneys to recover. Otherwise breathing is back to baseline. Tolerating food well. Still trying to stick to the diet. No chest pain, no abdominal pain.      NSR on tele   Medications:     Scheduled Meds:   iron sucrose  300 mg Intravenous Once    nystatin   Topical BID    clindamycin   Topical BID    pantoprazole  40 mg Oral QAM AC    cefTRIAXone (ROCEPHIN) IV  1 g Intravenous Q24H    insulin glargine  25 Units Subcutaneous Nightly    [Held by provider] amiodarone  200 mg Oral Daily    atorvastatin  40 mg Oral Daily    carvedilol  12.5 mg Oral BID WC    ferrous gluconate  324 mg Oral BID WC    gabapentin  800 mg Oral TID    insulin lispro  0-12 Units Subcutaneous TID WC    insulin lispro  0-6 Units Subcutaneous Nightly    sodium chloride flush  10 mL Intravenous 2 times per day    heparin (porcine)  5,000 Units Subcutaneous 3 times per day    vitamin D  5,000 Units Oral Daily     Continuous Infusions:   dextrose       PRN Meds:ipratropium-albuterol, glucose, dextrose, glucagon (rDNA), dextrose, HYDROcodone-acetaminophen, sodium chloride flush, acetaminophen, acetaminophen, polyethylene glycol, promethazine, ondansetron, perflutren lipid microspheres    Objective:   Vitals:   T-max:  Patient Vitals for the past 8 hrs:   BP Temp Temp src Resp SpO2 Weight   02/23/20 0503 (!) 134/49 98.7 °F (37.1 °C) Oral 18 92 % 265 lb 1.6 oz (120.2 kg)       Intake/Output Summary (Last 24 hours) at 2/23/2020 0916  Last data filed at 2/23/2020 0700  Gross per 24 hour   Intake 1320 ml   Output 2850 ml   Net -1530 ml       Physical Exam  Constitutional:       General: 141 142   K 4.5 4.5 4.9    104 103   CO2 23 25 23   BUN 48* 53* 50*   CREATININE 2.5* 2.6* 2.4*   GLUCOSE 87 75 84   CALCIUM 8.9 8.6 8.9   MG 2.30 2.40 2.60*   ANIONGAP 15 12 16     Hepatic: No results for input(s): AST, ALT, BILITOT, BILIDIR, PROT, LABALBU, ALKPHOS in the last 72 hours. Troponin:   No results for input(s): TROPONINI in the last 72 hours. BNP: No results for input(s): BNP in the last 72 hours. Lipids:   No results for input(s): CHOL, HDL in the last 72 hours. Invalid input(s): LDLCALCU, TRIGLYCERIDE  ABGs:  No results for input(s): PHART, HGH1JKI, PO2ART, IEZ5BDN, BEART, THGBART, N4QENIMR, HBU6BBA in the last 72 hours. INR: No results for input(s): INR in the last 72 hours. Lactate: No results for input(s): LACTATE in the last 72 hours. Cultures:  -----------------------------------------------------------------  RAD:   NM MYOCARDIAL SPECT REST EXERCISE OR RX   Final Result      XR CHEST PORTABLE   Final Result     Pulmonary vascular prominence with perihilar and bibasilar asymmetric    changes are noted. Stable cardiomegaly. Findings are most consistent    with pulmonary edema. No definite focal consolidation. No large pleural    effusion noted in the lateral projection or pneumothorax.                 Assessment/Plan:   Jeanette Rued a 62 y.o. female with chronic systolic  heart failure.        Acute hypoxic, ATSXAGFPDCY HGBMQNKHVSO OCTXLGI 6/0 to diastolic CHF exacerbation  -- In setting of pickwickian syndrome   -- Furosemide injection 40 mg BID- held for today   - Echo on 2/19/20 35-40% with L ventricular hypokinesis diffuse.   -- Daily Weights, strict I&O   -- Heart failure nurse   -- Awaiting official read on [employer] to determine plan  - Still will need recovery of renal function      History of smoking with possible undiagnosed COPD & SHAQ & OHS   - Wheezing on lung exam   - No PFT on file   - Sleep consult as outpatient   - Pulmonary consult as out patient   - Duonebs   - Keep saturation greater than 90%      Anemia Chronic mixed iron deficiency and chronic disease   Ferrous gluconate  One time iron venofer      Hypertension  -- Coreg 12.5 mg BID with meals      Paroxysmal A fib  [id number] score=5 Has ATRIA bleeding risk- high therefore not recommended to be on warfarin. HASBLED =2   -- Amiodarone 200 mg PO qd on hold   --Restart home Coreg   --Was previously on Warfarin and Eliquis but had interal bleeding and was removed by cardiologist      LAURIE on CKD 4  -Nephrology following  -Lasix 40 daily- held as patient appears more euvolemic and Cr increasing   -Daily BMP      GERD  Protonix     DMII: patinet had hypoglycemia and lantus dose was changed  -- MDSS  --  Insulin Glargine 25 Units s/c qn   -- Hypoglycemia protocol      HLD  -- Lipitor PO 40 mg qd     Leukocytosis: 14.3 on admission. Patient was afebrile. Resolved   UA ordered that was moderate LE      Morbid obesity: BMI 45.9  Diet renal with carb control         Code Status:  Full Code  FEN: Renal and cardiac   PPX:  SCDs, Heparin   DISPO: Kwadwo Lacey, PGY- 2  02/23/20  9:16 AM    This patient has been staffed and discussed with Nicole Salinas MD.

## 2020-02-23 NOTE — PROGRESS NOTES
CO2 23 25   BUN 48* 53*   CREATININE 2.5* 2.6*     LIVER PROFILE: No results for input(s): AST, ALT, LIPASE, BILIDIR, BILITOT, ALKPHOS in the last 72 hours. Invalid input(s): AMYLASE,  ALB  PT/INR: No results for input(s): PROTIME, INR in the last 72 hours. APTT: No results for input(s): APTT in the last 72 hours. BNP:  No results for input(s): BNP in the last 72 hours. IMAGING:     Assessment:  Patient Active Problem List    Diagnosis Date Noted    Anemia 05/24/2017     Priority: Low    On amiodarone therapy 09/11/2016     Priority: Low    Hyperlipidemia 09/11/2016     Priority: Low    Colitis presumed infectious      Priority: Low    Persistent atrial fibrillation 06/01/2016     Priority: Low    Sepsis ([facility] 06/01/2016     Priority: Low    Acute renal failure (HCC)      Priority: Low    Non-intractable vomiting with nausea      Priority: Low    Lactic acidemia      Priority: Low    Leukocytosis      Priority: Low    DKA (diabetic ketoacidoses) ([facility]      Priority: Low    Left axillary hidradenitis     Dilated cardiomyopathy ([facility] 02/20/2020    Pulmonary edema, acute, with congestive heart disease ([facility] 02/19/2020    Elevated troponin 02/19/2020    Essential hypertension 02/19/2020    Pneumonia, interstitial ([facility] 04/07/2019    Acute renal failure superimposed on chronic kidney disease ([facility] 04/07/2019    Uncontrolled type 2 diabetes mellitus with hyperglycemia ([facility] 04/07/2019    Acute on chronic diastolic congestive heart failure ([facility] 04/04/2019    Leg swelling 03/12/2019    Venous insufficiency 03/12/2019    Chronic venous htn w inflammation of bilateral low extrm 03/12/2019     Imp: CHF/trop/RF/HTN/cardiomyop    Plan:  Breathing reasonable. Cr pending currently. Watch cr. On RA. BP better overall. Will see official read of both scans in am and if reversible. In mean time continue to monitor cr.        Core Measures:  · Discharge instructions:   · LVEF documented:   · ACEI for LV dysfunction:   · Smoking Cessation:    Hank Kahn MD 2/23/2020 7:03 AM

## 2020-02-24 LAB
ANION GAP SERPL CALCULATED.3IONS-SCNC: 14 MMOL/L (ref 3–16)
BASOPHILS ABSOLUTE: 0 K/UL (ref 0–0.2)
BASOPHILS RELATIVE PERCENT: 0.4 %
BUN BLDV-MCNC: 49 MG/DL (ref 7–20)
CALCIUM SERPL-MCNC: 9.3 MG/DL (ref 8.3–10.6)
CHLORIDE BLD-SCNC: 103 MMOL/L (ref 99–110)
CO2: 24 MMOL/L (ref 21–32)
CREAT SERPL-MCNC: 2.4 MG/DL (ref 0.6–1.2)
EKG ATRIAL RATE: 57 BPM
EKG DIAGNOSIS: NORMAL
EKG P AXIS: 57 DEGREES
EKG P-R INTERVAL: 182 MS
EKG Q-T INTERVAL: 454 MS
EKG QRS DURATION: 90 MS
EKG QTC CALCULATION (BAZETT): 441 MS
EKG R AXIS: 71 DEGREES
EKG T AXIS: 83 DEGREES
EKG VENTRICULAR RATE: 57 BPM
EOSINOPHILS ABSOLUTE: 0.4 K/UL (ref 0–0.6)
EOSINOPHILS RELATIVE PERCENT: 5.5 %
GFR AFRICAN AMERICAN: 25
GFR NON-AFRICAN AMERICAN: 20
GLUCOSE BLD-MCNC: 124 MG/DL (ref 70–99)
GLUCOSE BLD-MCNC: 149 MG/DL (ref 70–99)
GLUCOSE BLD-MCNC: 159 MG/DL (ref 70–99)
GLUCOSE BLD-MCNC: 173 MG/DL (ref 70–99)
HCT VFR BLD CALC: 25.2 % (ref 36–48)
HEMOGLOBIN: 8.1 G/DL (ref 12–16)
LYMPHOCYTES ABSOLUTE: 1.9 K/UL (ref 1–5.1)
LYMPHOCYTES RELATIVE PERCENT: 23.6 %
MAGNESIUM: 2.7 MG/DL (ref 1.8–2.4)
MCH RBC QN AUTO: 28.3 PG (ref 26–34)
MCHC RBC AUTO-ENTMCNC: 32.4 G/DL (ref 31–36)
MCV RBC AUTO: 87.4 FL (ref 80–100)
MONOCYTES ABSOLUTE: 0.7 K/UL (ref 0–1.3)
MONOCYTES RELATIVE PERCENT: 9.4 %
NEUTROPHILS ABSOLUTE: 4.8 K/UL (ref 1.7–7.7)
NEUTROPHILS RELATIVE PERCENT: 61.1 %
PDW BLD-RTO: 16.9 % (ref 12.4–15.4)
PERFORMED ON: ABNORMAL
PLATELET # BLD: 268 K/UL (ref 135–450)
PMV BLD AUTO: 7.7 FL (ref 5–10.5)
POTASSIUM SERPL-SCNC: 4.9 MMOL/L (ref 3.5–5.1)
RBC # BLD: 2.88 M/UL (ref 4–5.2)
SODIUM BLD-SCNC: 141 MMOL/L (ref 136–145)
WBC # BLD: 7.9 K/UL (ref 4–11)

## 2020-02-24 PROCEDURE — 6370000000 HC RX 637 (ALT 250 FOR IP): Performed by: STUDENT IN AN ORGANIZED HEALTH CARE EDUCATION/TRAINING PROGRAM

## 2020-02-24 PROCEDURE — 80048 BASIC METABOLIC PNL TOTAL CA: CPT

## 2020-02-24 PROCEDURE — 2580000003 HC RX 258: Performed by: STUDENT IN AN ORGANIZED HEALTH CARE EDUCATION/TRAINING PROGRAM

## 2020-02-24 PROCEDURE — 93005 ELECTROCARDIOGRAM TRACING: CPT | Performed by: STUDENT IN AN ORGANIZED HEALTH CARE EDUCATION/TRAINING PROGRAM

## 2020-02-24 PROCEDURE — 36415 COLL VENOUS BLD VENIPUNCTURE: CPT

## 2020-02-24 PROCEDURE — 85025 COMPLETE CBC W/AUTO DIFF WBC: CPT

## 2020-02-24 PROCEDURE — 83735 ASSAY OF MAGNESIUM: CPT

## 2020-02-24 PROCEDURE — 6370000000 HC RX 637 (ALT 250 FOR IP): Performed by: HOSPITALIST

## 2020-02-24 PROCEDURE — 87086 URINE CULTURE/COLONY COUNT: CPT

## 2020-02-24 PROCEDURE — 99233 SBSQ HOSP IP/OBS HIGH 50: CPT | Performed by: INTERNAL MEDICINE

## 2020-02-24 PROCEDURE — 2060000000 HC ICU INTERMEDIATE R&B

## 2020-02-24 PROCEDURE — 93010 ELECTROCARDIOGRAM REPORT: CPT | Performed by: INTERNAL MEDICINE

## 2020-02-24 RX ORDER — FUROSEMIDE 10 MG/ML
40 INJECTION INTRAMUSCULAR; INTRAVENOUS ONCE
Status: DISCONTINUED | OUTPATIENT
Start: 2020-02-24 | End: 2020-02-24

## 2020-02-24 RX ORDER — TORSEMIDE 20 MG/1
40 TABLET ORAL DAILY
Status: DISCONTINUED | OUTPATIENT
Start: 2020-02-25 | End: 2020-02-25 | Stop reason: HOSPADM

## 2020-02-24 RX ADMIN — POLYETHYLENE GLYCOL 3350 17 G: 17 POWDER, FOR SOLUTION ORAL at 09:31

## 2020-02-24 RX ADMIN — ATORVASTATIN CALCIUM 40 MG: 40 TABLET, FILM COATED ORAL at 09:20

## 2020-02-24 RX ADMIN — INSULIN LISPRO 2 UNITS: 100 INJECTION, SOLUTION INTRAVENOUS; SUBCUTANEOUS at 18:11

## 2020-02-24 RX ADMIN — HYDROCODONE BITARTRATE AND ACETAMINOPHEN 1 TABLET: 7.5; 325 TABLET ORAL at 11:59

## 2020-02-24 RX ADMIN — HYDROCODONE BITARTRATE AND ACETAMINOPHEN 1 TABLET: 7.5; 325 TABLET ORAL at 18:22

## 2020-02-24 RX ADMIN — CLINDAMYCIN PHOSPHATE: 10 SOLUTION TOPICAL at 09:43

## 2020-02-24 RX ADMIN — HYDROCODONE BITARTRATE AND ACETAMINOPHEN 1 TABLET: 7.5; 325 TABLET ORAL at 05:20

## 2020-02-24 RX ADMIN — PANTOPRAZOLE SODIUM 40 MG: 40 TABLET, DELAYED RELEASE ORAL at 05:20

## 2020-02-24 RX ADMIN — INSULIN LISPRO 1 UNITS: 100 INJECTION, SOLUTION INTRAVENOUS; SUBCUTANEOUS at 21:25

## 2020-02-24 RX ADMIN — NYSTATIN: 100000 CREAM TOPICAL at 21:22

## 2020-02-24 RX ADMIN — Medication 10 ML: at 09:24

## 2020-02-24 RX ADMIN — INSULIN LISPRO 2 UNITS: 100 INJECTION, SOLUTION INTRAVENOUS; SUBCUTANEOUS at 09:32

## 2020-02-24 RX ADMIN — NYSTATIN: 100000 CREAM TOPICAL at 09:42

## 2020-02-24 RX ADMIN — GABAPENTIN 800 MG: 400 CAPSULE ORAL at 09:24

## 2020-02-24 RX ADMIN — FERROUS GLUCONATE TAB 324 MG (37.5 MG ELEMENTAL IRON) 324 MG: 324 (37.5 FE) TAB at 16:45

## 2020-02-24 RX ADMIN — FUROSEMIDE 40 MG: 40 TABLET ORAL at 09:21

## 2020-02-24 RX ADMIN — CLINDAMYCIN PHOSPHATE: 10 SOLUTION TOPICAL at 21:23

## 2020-02-24 RX ADMIN — Medication 10 ML: at 21:24

## 2020-02-24 RX ADMIN — CARVEDILOL 12.5 MG: 12.5 TABLET, FILM COATED ORAL at 16:45

## 2020-02-24 RX ADMIN — FERROUS GLUCONATE TAB 324 MG (37.5 MG ELEMENTAL IRON) 324 MG: 324 (37.5 FE) TAB at 09:20

## 2020-02-24 RX ADMIN — GABAPENTIN 800 MG: 400 CAPSULE ORAL at 14:31

## 2020-02-24 RX ADMIN — Medication 5000 UNITS: at 09:21

## 2020-02-24 RX ADMIN — GABAPENTIN 800 MG: 400 CAPSULE ORAL at 21:24

## 2020-02-24 ASSESSMENT — PAIN DESCRIPTION - PAIN TYPE
TYPE: CHRONIC PAIN

## 2020-02-24 ASSESSMENT — PAIN - FUNCTIONAL ASSESSMENT
PAIN_FUNCTIONAL_ASSESSMENT: ACTIVITIES ARE NOT PREVENTED
PAIN_FUNCTIONAL_ASSESSMENT: PREVENTS OR INTERFERES SOME ACTIVE ACTIVITIES AND ADLS

## 2020-02-24 ASSESSMENT — PAIN SCALES - GENERAL
PAINLEVEL_OUTOF10: 10
PAINLEVEL_OUTOF10: 9
PAINLEVEL_OUTOF10: 10
PAINLEVEL_OUTOF10: 8
PAINLEVEL_OUTOF10: 9
PAINLEVEL_OUTOF10: 10
PAINLEVEL_OUTOF10: 10
PAINLEVEL_OUTOF10: 9
PAINLEVEL_OUTOF10: 9
PAINLEVEL_OUTOF10: 8
PAINLEVEL_OUTOF10: 10

## 2020-02-24 ASSESSMENT — PAIN DESCRIPTION - FREQUENCY
FREQUENCY: CONTINUOUS

## 2020-02-24 ASSESSMENT — PAIN DESCRIPTION - ONSET
ONSET: ON-GOING

## 2020-02-24 ASSESSMENT — PAIN DESCRIPTION - LOCATION
LOCATION: GENERALIZED
LOCATION: HIP;SHOULDER;KNEE

## 2020-02-24 ASSESSMENT — PAIN DESCRIPTION - PROGRESSION
CLINICAL_PROGRESSION: GRADUALLY WORSENING
CLINICAL_PROGRESSION: NOT CHANGED
CLINICAL_PROGRESSION: GRADUALLY WORSENING
CLINICAL_PROGRESSION: NOT CHANGED
CLINICAL_PROGRESSION: NOT CHANGED
CLINICAL_PROGRESSION: GRADUALLY IMPROVING
CLINICAL_PROGRESSION: NOT CHANGED

## 2020-02-24 ASSESSMENT — PAIN DESCRIPTION - DESCRIPTORS
DESCRIPTORS: CONSTANT;ACHING
DESCRIPTORS: ACHING
DESCRIPTORS: CONSTANT;ACHING
DESCRIPTORS: ACHING

## 2020-02-24 ASSESSMENT — PAIN DESCRIPTION - ORIENTATION
ORIENTATION: LEFT;RIGHT
ORIENTATION: RIGHT;LEFT

## 2020-02-24 NOTE — PROGRESS NOTES
02/24/20  0521    142 141   K 4.5 4.9 4.9    103 103   CO2 25 23 24   BUN 53* 50* 49*   CREATININE 2.6* 2.4* 2.4*     LIVER PROFILE: No results for input(s): AST, ALT, LIPASE, BILIDIR, BILITOT, ALKPHOS in the last 72 hours. Invalid input(s): AMYLASE,  ALB  PT/INR: No results for input(s): PROTIME, INR in the last 72 hours. APTT: No results for input(s): APTT in the last 72 hours. BNP:  No results for input(s): BNP in the last 72 hours. IMAGING:     Assessment:  Patient Active Problem List    Diagnosis Date Noted    Anemia 05/24/2017     Priority: Low    On amiodarone therapy 09/11/2016     Priority: Low    Hyperlipidemia 09/11/2016     Priority: Low    Colitis presumed infectious      Priority: Low    Persistent atrial fibrillation 06/01/2016     Priority: Low    Sepsis ([facility] 06/01/2016     Priority: Low    Acute renal failure (HCC)      Priority: Low    Non-intractable vomiting with nausea      Priority: Low    Lactic acidemia      Priority: Low    Leukocytosis      Priority: Low    DKA (diabetic ketoacidoses) ([facility]      Priority: Low    Left axillary hidradenitis     Dilated cardiomyopathy ([facility] 02/20/2020    Pulmonary edema, acute, with congestive heart disease ([facility] 02/19/2020    Elevated troponin 02/19/2020    Essential hypertension 02/19/2020    Pneumonia, interstitial ([facility] 04/07/2019    Acute renal failure superimposed on chronic kidney disease ([facility] 04/07/2019    Uncontrolled type 2 diabetes mellitus with hyperglycemia ([facility] 04/07/2019    Acute on chronic diastolic congestive heart failure ([facility] 04/04/2019    Leg swelling 03/12/2019    Venous insufficiency 03/12/2019    Chronic venous htn w inflammation of bilateral low extrm 03/12/2019     Imp: CHF/trop/RF/HTN/cardiomyop    Plan:  Breathing reasonable. Cr stable at 2.4. Watch cr. Again on O2. BP better overall.  myoview showing mixed defect with normal LV function. She is having no angina.   In view of above

## 2020-02-24 NOTE — DISCHARGE SUMMARY
INTERNAL MEDICINE DEPARTMENT AT [address] [place] [address]  Discharge Summary At the [facility]    Patient ID: Xenia Helton                                             Discharge Date: 2/25/2020   Patient's PCP: Nnamdi Muniz MD                                          Discharge Physician: Sanjuana Chaudhari MD  Admit Date: 2/19/2020   Admitting Physician: Emma Obrien MD    PROBLEMS DURING HOSPITALIZATION:  Admission Diagnosis:  Pulmonary Edema     DISCHARGE DIAGNOSES:  Acute hypoxic/hypercapnic respiratory failure 2/2 to acute on chronic CHF exacerbation    LAURIE on CKD  HTN  DMII  Hydraenteritis Suppurativa       Hospital Course:    Xenia Helton is a 62 y.o. female, with a history of congestive heart failure, GERD, chronic kidney disease and diabetes mellitus who presented to the ED with a two-day history of SOB. She reports that she previously had a heavy meal, that included a lot of salt. She began to feel SOB after than and this continued into the night, to the point where she could not ambulate from the toilet seat. The symptoms persisted into this morning, so she was brought into the ED. She was satting in the 80s on arrival so was placed on BIPAP, which brought her Sa02 into the 90s. Her breathing improved and after 30 minutes she was taken off BIPAP and placed on nasal cannula with 4L of O2. She reports a productive cough with clear sputum, fatigue, several pound recent weight gain, leg swelling and fullness. She denied fever, chills, paresthesias, head ache and chest pain. The pateitn was admitted to the floor and started on lasix for volume overload. Cardiolgy was consulted and the patinet under went a echo that showed decreased LV function from her previously known baseline. A Lydia scan was ordered that was read as high risk scan but cardiology opted to try and manage her medically. While admitted nephrology was consulted as the patient developed an LAURIE on her CKD.  The patient had several days of diuresis until she appeared euvolemic and her Cr ws increasing . At which point diuresis was discontinued and she was switched to PO diuretics. The patient required supplemental oxygen intermittently and it was advised that she had an home oxygen evaluation, but the patient refused citing she did not want to go home with oxygen even if required. The patient was treated for her other comorbid conditions and was discharged home in stable condition with instructions to follow up with cardiology, nephrology and her PCP. Physical Exam:  BP (!) 151/78   Pulse 65   Temp 98.7 °F (37.1 °C) (Oral)   Resp 20   Ht 5' 5\" (1.651 m)   Wt 266 lb 8.6 oz (120.9 kg)   SpO2 93%   BMI 44.35 kg/m²     Physical Exam  Constitutional:       General: She is not in acute distress. Appearance: She is obese. She is not ill-appearing, toxic-appearing or diaphoretic. HENT:      Head: Normocephalic and atraumatic. Eyes:      General: No scleral icterus. Right eye: No discharge. Left eye: No discharge. Extraocular Movements: Extraocular movements intact. Conjunctiva/sclera: Conjunctivae normal.      Pupils: Pupils are equal, round, and reactive to light. Neck:      Musculoskeletal: Normal range of motion and neck supple. No neck rigidity or muscular tenderness. Cardiovascular:      Rate and Rhythm: Normal rate and regular rhythm. Pulses: Normal pulses. Heart sounds: Normal heart sounds. No murmur. No friction rub. No gallop. Pulmonary:      Effort: Pulmonary effort is normal. No respiratory distress. Breath sounds: Normal breath sounds. No stridor. No wheezing, rhonchi or rales. Abdominal:      General: Abdomen is flat. Bowel sounds are normal. There is no distension. Tenderness: There is no abdominal tenderness. There is no guarding. Musculoskeletal: Normal range of motion. General: No swelling. Right lower leg: No edema. Left lower leg: No edema. the skin nightly  What changed:  how much to take     polyethylene glycol packet  Commonly known as:  GLYCOLAX  What changed:  Another medication with the same name was removed. Continue taking this medication, and follow the directions you see here.         CONTINUE taking these medications    atorvastatin 40 MG tablet  Commonly known as:  LIPITOR     carvedilol 12.5 MG tablet  Commonly known as:  COREG     famotidine 40 MG tablet  Commonly known as:  PEPCID  Take 1 tablet by mouth 2 times daily     ferrous gluconate 324 (38 Fe) MG tablet  Commonly known as:  FERGON     gabapentin 800 MG tablet  Commonly known as:  NEURONTIN     HYDROcodone-acetaminophen 7.5-325 MG per tablet  Commonly known as:  Adolm Yuri        STOP taking these medications    amiodarone 200 MG tablet  Commonly known as:  CORDARONE  Notes to patient:  Stop taking     amLODIPine 10 MG tablet  Commonly known as:  NORVASC  Notes to patient:  Stop taking           Where to Get Your Medications      These medications were sent to [facility], [address] Simona Diaz [phone]  [address] [place] [address], [address] [place] [id number]    Phone:  [phone]   · albuterol sulfate  (90 Base) MCG/ACT inhaler  · clindamycin 1 % external solution  · insulin glargine 100 UNIT/ML injection pen  · nystatin 921149 UNIT/GM cream  · torsemide 20 MG tablet  · vitamin D 125 MCG (5000 UT) Caps capsule       Activity: activity as tolerated  Diet: cardiac diet as well as low carb and low sodium   Wound Care: as directed    Time Spent on discharge is more than 30 minutes      Signed:  Skip Varner MD, PGY-1  2/25/2020

## 2020-02-24 NOTE — PLAN OF CARE
Problem: Pain:  Goal: Pain level will decrease  Description  Pain level will decrease  Outcome: Ongoing   Patient being medicated for C/O chronic pain. See MAR for interventions. Problem: Falls - Risk of:  Goal: Will remain free from falls  Description  Will remain free from falls  2/23/2020 2238 by Rossy Brown RN  Outcome: Ongoing   Patient meets [facility] fall risk guidelines. Non skid footwear with ambulation. Bed in lowest position. Call light in reach. Bed alarm activated. Reminded to call for assistance before exiting bed. Continue safety precautions. Problem: Respiratory  Goal: O2 Sat > 90%  Outcome: Ongoing  Patient now on 1 liter per NC, oxygen 92%. 88% on room air. Denies any SOB    Problem: Risk for Impaired Skin Integrity  Goal: Tissue integrity - skin and mucous membranes  Description  Structural intactness and normal physiological function of skin and  mucous membranes. Outcome: Ongoing   Skin intact. Patient turns and repositions self.

## 2020-02-24 NOTE — PROGRESS NOTES
Progress Note    Admit Date: 2/19/2020  Day: 6  Diet: DIET RENAL; Carb Control: 4 carb choices (60 gms)/meal    CC: SOB    Interval history: The patein was seen and examined this morning. She is back on oxygen at 2L saturating at 97%. She was also bradycardic to the 40's but was asymptomatic. She denies any NV fever, chills, or SOB. Medications:     Scheduled Meds:   furosemide  40 mg Oral Daily    nystatin   Topical BID    clindamycin   Topical BID    pantoprazole  40 mg Oral QAM AC    cefTRIAXone (ROCEPHIN) IV  1 g Intravenous Q24H    insulin glargine  25 Units Subcutaneous Nightly    [Held by provider] amiodarone  200 mg Oral Daily    atorvastatin  40 mg Oral Daily    carvedilol  12.5 mg Oral BID WC    ferrous gluconate  324 mg Oral BID WC    gabapentin  800 mg Oral TID    insulin lispro  0-12 Units Subcutaneous TID WC    insulin lispro  0-6 Units Subcutaneous Nightly    sodium chloride flush  10 mL Intravenous 2 times per day    heparin (porcine)  5,000 Units Subcutaneous 3 times per day    vitamin D  5,000 Units Oral Daily     Continuous Infusions:   dextrose       PRN Meds:ipratropium-albuterol, glucose, dextrose, glucagon (rDNA), dextrose, HYDROcodone-acetaminophen, sodium chloride flush, acetaminophen, acetaminophen, polyethylene glycol, promethazine, ondansetron, perflutren lipid microspheres    Objective:   Vitals:   T-max:  Patient Vitals for the past 8 hrs:   BP Temp Temp src Pulse Resp SpO2 Height Weight   02/24/20 0816 (!) 144/71 98 °F (36.7 °C) Oral 52 18 97 % 5' 5\" (1.651 m) 266 lb 8.6 oz (120.9 kg)   02/24/20 0526 -- -- -- -- -- -- -- 264 lb 3.2 oz (119.8 kg)   02/24/20 0322 118/62 98.3 °F (36.8 °C) Oral 62 16 92 % -- --       Intake/Output Summary (Last 24 hours) at 2/24/2020 0852  Last data filed at 2/24/2020 0816  Gross per 24 hour   Intake 720 ml   Output 2300 ml   Net -1580 ml       Physical Exam  Constitutional:       General: She is not in acute distress.      Appearance: 103   CO2 25 23 24   BUN 53* 50* 49*   CREATININE 2.6* 2.4* 2.4*   GLUCOSE 75 84 124*   CALCIUM 8.6 8.9 9.3   MG 2.40 2.60* 2.70*   ANIONGAP 12 16 14     Hepatic: No results for input(s): AST, ALT, BILITOT, BILIDIR, PROT, LABALBU, ALKPHOS in the last 72 hours. Troponin: No results for input(s): TROPONINI in the last 72 hours. BNP: No results for input(s): BNP in the last 72 hours. Lipids: No results for input(s): CHOL, HDL in the last 72 hours. Invalid input(s): LDLCALCU, TRIGLYCERIDE  ABGs:  No results for input(s): PHART, UOO8WTM, PO2ART, MHC4SDU, BEART, THGBART, F8RISRGN, DIR3FOW in the last 72 hours. INR: No results for input(s): INR in the last 72 hours. Lactate: No results for input(s): LACTATE in the last 72 hours. Cultures:  -----------------------------------------------------------------  RAD:   NM MYOCARDIAL SPECT REST EXERCISE OR RX   Final Result      XR CHEST PORTABLE   Final Result     Pulmonary vascular prominence with perihilar and bibasilar asymmetric    changes are noted. Stable cardiomegaly. Findings are most consistent    with pulmonary edema. No definite focal consolidation. No large pleural    effusion noted in the lateral projection or pneumothorax. Assessment/Plan:   Acute hypoxic, TQADENUNKGY UWRTGGQPUYT BTLTNNB 2/3 to diastolic CHF exacerbation. -- Echo (last echo done in 2016, EF 55-60%); Consult Cardiology.  --Echo on 2/19/20 35-40% with L ventricular hypokinesis diffuse.   -- In setting of pickwickian syndrome   -- Furosemide oral daily   -- Daily Weights, strict I&O   -- Heart failure nurse   --Cardiology consulted appreciate recommendations   --Lydia scan read as High risk scan awaiting to see if cardiology will require intervention   -- It was discussed with patient the possible need for home oxygen but patient stated that she will not take oxygen home whether she required it or not.      History of smoking with possible undiagnosed COPD & SHAQ & OHS   - Sleep consult as outpatient   - Pulmonary consult as out patient   - Duonebs   - Keep saturation greater than 90%      Anemia Chronic mixed iron deficiency and chronic disease   Ferrous gluconate     Hypertension  -- Coreg 12.5 mg BID with meals      Paroxysmal A fib  [id number] score=5 Has ATRIA bleeding risk- high therefore not recommended to be on warfarin. HASBLED =2   -- Amiodarone 200 mg PO qd on hold   --Restart home Coreg   --Was previously on Warfarin and Eliquis but had interal bleeding and was removed by cardiologist     Bradycardia: Asymptomatic  -EKG ordered  -Put holding parameters on Coreg       LAURIE on CKD 4- improving Creatinine baseline 2  -Nephrology following  -Lasix 40 PO daily- held as patient appears more euvolemic and Cr increasing   -Daily BMP        DMII: patinet had hypoglycemia and lantus dose was changed  -- MDSS  --  Insulin Glargine 25 Units s/c qn   -- Hypoglycemia protocol      HLD  -- Lipitor PO 40 mg qd        GERD  Protonix    Leukocytosis: 14.3 on admission. Patient was afebrile. Resolved   UA ordered that was moderate LE    -Stopped Rocephin     Hydradenitis suppurativa  -Surgery was consulted pateitn did not want surgical intervention  -Topical clindamycin     Morbid obesity: BMI 45.9  Diet renal with carb control      Chronic pain  Home norco and gabapentin      Code Status:  Full Code  FEN: Renal and cardiac   PPX:  SCDs, Heparin   DISPO:  ESVIN Carlson, PGY-1  02/24/20  8:52 AM    This patient has been staffed and discussed with Nory Muñoz MD.

## 2020-02-24 NOTE — CARE COORDINATION
Case Management Assessment           Daily Note                 Date/ Time of Note: 2/24/2020 4:31 PM         Note completed by: Radha Goode    Patient Name: Amanda Beltran  YOB: 1960    Diagnosis:Pulmonary edema, acute, with congestive heart disease ([facility] [address] [I50.1]  Pulmonary edema, acute, with congestive heart disease ([facility] [address] [I50.1]  Patient Admission Status: Inpatient    Date of Admission:2/19/2020  6:04 AM Length of Stay: 5 GLOS:      Current Plan of Care: monitoring creatinine, poss cardiac cath once creat down, weaning O2 (currently down to 2L)  ________________________________________________________________________________________  PT AM-PAC:   / 24 per last evaluation on: not ordered    OT AM-PAC:   / 24 per last evaluation on: not ordered    DME Needs for discharge:   ________________________________________________________________________________________  Discharge Plan: Home    Tentative discharge date: TBD    Current barriers to discharge: medical clearance and stability    Referrals completed: Not Applicable    Resources/ information provided: Not indicated at this time  ________________________________________________________________________________________  Case Management Notes: CM continues to follow for discharge planning and needs. Patient continues with oxygen needs, weaning and currently down to 2L, may need home oxygen at discharge. Patient continues to plan for return home at discharge, patient may need cardiac cath this admission once her creatinine has improved. Adriana Larios and her family were provided with choice of provider; she and her family are in agreement with the discharge plan.     Care Transition Patient: Peyton Goode RN  [facility]  Case Management Department  Ph: [phone]  Fax: [phone]

## 2020-02-24 NOTE — PROGRESS NOTES
Nephrology Note                                                                                                                                                                                                                                                                                                                                                               Office : [phone]     Fax :[phone]              Patient's Name: Buck Osler  11:35 AM  2/24/2020    Reason for Consult:  CKD    Chief Complaint: SOB    History of Present Ilness:    Buck Osler is a 62 y.o. female with PMH of CKD, CHF, DM and HTN who presents with a one-day history of SOB. Pt states that she woke up yesterday morning feeling some SOB and decided to leave the house to go to [facility], where she had a large meal. Her SOB became progressively worse after this, prompting her to come in today. She was initially placed on BiPAP, and is now on 4L O2 by NC. She continues to feel SOB, however she does feel improved since admission. Pt is known to Dr. Nithin Parikh, last seen in office on 01/30. No acute issues at the time were noted other than slightly high K to 4.9, low K diet was discussed with patient. Pt has baseline Cr of approximately 2.0, currently 2.1. She was hypertensive on presentation to the 160s, now normotensive. Pt is compliant with her medications. CXR done in ED showed pulmonary edema.      Interval History    Very anxious   Cr stable   Good UO   Stress test - scar vs ischemia      Allergies:  Dicumarol; Percocet [oxycodone-acetaminophen]; and Warfarin and related    Current Medications:    furosemide (LASIX) injection 40 mg, Once  ipratropium-albuterol (DUONEB) nebulizer solution 1 ampule, Q4H PRN  furosemide (LASIX) tablet 40 mg, Daily  nystatin (MYCOSTATIN) cream, BID  clindamycin (CLEOCIN T) 1 % external solution, BID  pantoprazole (PROTONIX) tablet 40 mg, QAM AC  insulin glargine (LANTUS) injection pen 25 Units,

## 2020-02-25 VITALS
RESPIRATION RATE: 20 BRPM | HEART RATE: 65 BPM | TEMPERATURE: 98.7 F | HEIGHT: 65 IN | BODY MASS INDEX: 44.41 KG/M2 | WEIGHT: 266.54 LBS | OXYGEN SATURATION: 93 % | SYSTOLIC BLOOD PRESSURE: 151 MMHG | DIASTOLIC BLOOD PRESSURE: 78 MMHG

## 2020-02-25 PROBLEM — R94.39 ABNORMAL STRESS TEST: Status: ACTIVE | Noted: 2020-02-25

## 2020-02-25 LAB
ANION GAP SERPL CALCULATED.3IONS-SCNC: 13 MMOL/L (ref 3–16)
BASOPHILS ABSOLUTE: 0 K/UL (ref 0–0.2)
BASOPHILS RELATIVE PERCENT: 0.5 %
BUN BLDV-MCNC: 47 MG/DL (ref 7–20)
CALCIUM SERPL-MCNC: 8.8 MG/DL (ref 8.3–10.6)
CHLORIDE BLD-SCNC: 103 MMOL/L (ref 99–110)
CO2: 23 MMOL/L (ref 21–32)
CREAT SERPL-MCNC: 2.3 MG/DL (ref 0.6–1.2)
EOSINOPHILS ABSOLUTE: 0.5 K/UL (ref 0–0.6)
EOSINOPHILS RELATIVE PERCENT: 5.6 %
GFR AFRICAN AMERICAN: 26
GFR NON-AFRICAN AMERICAN: 21
GLUCOSE BLD-MCNC: 140 MG/DL (ref 70–99)
GLUCOSE BLD-MCNC: 165 MG/DL (ref 70–99)
GLUCOSE BLD-MCNC: 93 MG/DL (ref 70–99)
HCT VFR BLD CALC: 23.7 % (ref 36–48)
HEMOGLOBIN: 7.6 G/DL (ref 12–16)
LYMPHOCYTES ABSOLUTE: 1.7 K/UL (ref 1–5.1)
LYMPHOCYTES RELATIVE PERCENT: 20.5 %
MAGNESIUM: 2.6 MG/DL (ref 1.8–2.4)
MCH RBC QN AUTO: 28.4 PG (ref 26–34)
MCHC RBC AUTO-ENTMCNC: 32.3 G/DL (ref 31–36)
MCV RBC AUTO: 88 FL (ref 80–100)
MONOCYTES ABSOLUTE: 0.9 K/UL (ref 0–1.3)
MONOCYTES RELATIVE PERCENT: 10.4 %
NEUTROPHILS ABSOLUTE: 5.2 K/UL (ref 1.7–7.7)
NEUTROPHILS RELATIVE PERCENT: 63 %
PDW BLD-RTO: 16.9 % (ref 12.4–15.4)
PERFORMED ON: ABNORMAL
PERFORMED ON: NORMAL
PLATELET # BLD: 253 K/UL (ref 135–450)
PMV BLD AUTO: 7.6 FL (ref 5–10.5)
POTASSIUM SERPL-SCNC: 5.1 MMOL/L (ref 3.5–5.1)
RBC # BLD: 2.69 M/UL (ref 4–5.2)
SODIUM BLD-SCNC: 139 MMOL/L (ref 136–145)
URINE CULTURE, ROUTINE: NORMAL
WBC # BLD: 8.3 K/UL (ref 4–11)

## 2020-02-25 PROCEDURE — 36415 COLL VENOUS BLD VENIPUNCTURE: CPT

## 2020-02-25 PROCEDURE — 6370000000 HC RX 637 (ALT 250 FOR IP): Performed by: STUDENT IN AN ORGANIZED HEALTH CARE EDUCATION/TRAINING PROGRAM

## 2020-02-25 PROCEDURE — 99233 SBSQ HOSP IP/OBS HIGH 50: CPT | Performed by: INTERNAL MEDICINE

## 2020-02-25 PROCEDURE — 6360000002 HC RX W HCPCS: Performed by: INTERNAL MEDICINE

## 2020-02-25 PROCEDURE — 2580000003 HC RX 258: Performed by: STUDENT IN AN ORGANIZED HEALTH CARE EDUCATION/TRAINING PROGRAM

## 2020-02-25 PROCEDURE — 2580000003 HC RX 258: Performed by: INTERNAL MEDICINE

## 2020-02-25 PROCEDURE — 80048 BASIC METABOLIC PNL TOTAL CA: CPT

## 2020-02-25 PROCEDURE — 85025 COMPLETE CBC W/AUTO DIFF WBC: CPT

## 2020-02-25 PROCEDURE — 83735 ASSAY OF MAGNESIUM: CPT

## 2020-02-25 PROCEDURE — 6370000000 HC RX 637 (ALT 250 FOR IP): Performed by: INTERNAL MEDICINE

## 2020-02-25 RX ORDER — TORSEMIDE 20 MG/1
40 TABLET ORAL DAILY
Qty: 30 TABLET | Refills: 3 | Status: ON HOLD | OUTPATIENT
Start: 2020-02-25 | End: 2020-03-30 | Stop reason: SDUPTHER

## 2020-02-25 RX ORDER — NYSTATIN 100000 U/G
CREAM TOPICAL
Qty: 1 TUBE | Refills: 1 | Status: SHIPPED | OUTPATIENT
Start: 2020-02-25

## 2020-02-25 RX ORDER — ALBUTEROL SULFATE 90 UG/1
2 AEROSOL, METERED RESPIRATORY (INHALATION) 4 TIMES DAILY PRN
Qty: 1 INHALER | Refills: 0 | Status: SHIPPED | OUTPATIENT
Start: 2020-02-25

## 2020-02-25 RX ORDER — IPRATROPIUM BROMIDE AND ALBUTEROL SULFATE 2.5; .5 MG/3ML; MG/3ML
3 SOLUTION RESPIRATORY (INHALATION) EVERY 4 HOURS PRN
Qty: 360 ML | Refills: 2 | Status: SHIPPED | OUTPATIENT
Start: 2020-02-25 | End: 2020-02-25 | Stop reason: HOSPADM

## 2020-02-25 RX ORDER — CLINDAMYCIN PHOSPHATE 11.9 MG/ML
SOLUTION TOPICAL
Qty: 30 ML | Refills: 0 | Status: SHIPPED | OUTPATIENT
Start: 2020-02-25 | End: 2020-03-25

## 2020-02-25 RX ADMIN — HYDROCODONE BITARTRATE AND ACETAMINOPHEN 1 TABLET: 7.5; 325 TABLET ORAL at 00:30

## 2020-02-25 RX ADMIN — TORSEMIDE 40 MG: 20 TABLET ORAL at 08:17

## 2020-02-25 RX ADMIN — IRON SUCROSE 200 MG: 20 INJECTION, SOLUTION INTRAVENOUS at 09:59

## 2020-02-25 RX ADMIN — Medication 10 ML: at 08:19

## 2020-02-25 RX ADMIN — PANTOPRAZOLE SODIUM 40 MG: 40 TABLET, DELAYED RELEASE ORAL at 08:18

## 2020-02-25 RX ADMIN — NYSTATIN: 100000 CREAM TOPICAL at 08:21

## 2020-02-25 RX ADMIN — GABAPENTIN 800 MG: 400 CAPSULE ORAL at 08:18

## 2020-02-25 RX ADMIN — ATORVASTATIN CALCIUM 40 MG: 40 TABLET, FILM COATED ORAL at 08:18

## 2020-02-25 RX ADMIN — CARVEDILOL 12.5 MG: 12.5 TABLET, FILM COATED ORAL at 08:19

## 2020-02-25 RX ADMIN — CLINDAMYCIN PHOSPHATE: 10 SOLUTION TOPICAL at 08:20

## 2020-02-25 RX ADMIN — INSULIN LISPRO 2 UNITS: 100 INJECTION, SOLUTION INTRAVENOUS; SUBCUTANEOUS at 12:41

## 2020-02-25 RX ADMIN — HYDROCODONE BITARTRATE AND ACETAMINOPHEN 1 TABLET: 7.5; 325 TABLET ORAL at 08:18

## 2020-02-25 RX ADMIN — FERROUS GLUCONATE TAB 324 MG (37.5 MG ELEMENTAL IRON) 324 MG: 324 (37.5 FE) TAB at 08:19

## 2020-02-25 RX ADMIN — Medication 5000 UNITS: at 08:19

## 2020-02-25 ASSESSMENT — PAIN DESCRIPTION - PROGRESSION
CLINICAL_PROGRESSION: NOT CHANGED
CLINICAL_PROGRESSION: NOT CHANGED
CLINICAL_PROGRESSION: GRADUALLY IMPROVING

## 2020-02-25 ASSESSMENT — PAIN DESCRIPTION - PAIN TYPE
TYPE: CHRONIC PAIN

## 2020-02-25 ASSESSMENT — PAIN DESCRIPTION - LOCATION
LOCATION: GENERALIZED

## 2020-02-25 ASSESSMENT — PAIN DESCRIPTION - FREQUENCY
FREQUENCY: CONTINUOUS

## 2020-02-25 ASSESSMENT — PAIN - FUNCTIONAL ASSESSMENT
PAIN_FUNCTIONAL_ASSESSMENT: PREVENTS OR INTERFERES SOME ACTIVE ACTIVITIES AND ADLS

## 2020-02-25 ASSESSMENT — PAIN DESCRIPTION - DESCRIPTORS
DESCRIPTORS: ACHING

## 2020-02-25 ASSESSMENT — PAIN DESCRIPTION - ONSET
ONSET: ON-GOING

## 2020-02-25 ASSESSMENT — PAIN DESCRIPTION - ORIENTATION
ORIENTATION: RIGHT;LEFT

## 2020-02-25 ASSESSMENT — PAIN SCALES - GENERAL
PAINLEVEL_OUTOF10: 9
PAINLEVEL_OUTOF10: 8
PAINLEVEL_OUTOF10: 10
PAINLEVEL_OUTOF10: 9

## 2020-02-25 NOTE — PROGRESS NOTES
02/25/20  0442    141 139   K 4.9 4.9 5.1    103 103   CO2 23 24 23   BUN 50* 49* 47*   CREATININE 2.4* 2.4* 2.3*     LIVER PROFILE: No results for input(s): AST, ALT, LIPASE, BILIDIR, BILITOT, ALKPHOS in the last 72 hours. Invalid input(s): AMYLASE,  ALB  PT/INR: No results for input(s): PROTIME, INR in the last 72 hours. APTT: No results for input(s): APTT in the last 72 hours. BNP:  No results for input(s): BNP in the last 72 hours. IMAGING:     Assessment:  Patient Active Problem List    Diagnosis Date Noted    Anemia 05/24/2017     Priority: Low    On amiodarone therapy 09/11/2016     Priority: Low    Hyperlipidemia 09/11/2016     Priority: Low    Colitis presumed infectious      Priority: Low    Persistent atrial fibrillation 06/01/2016     Priority: Low    Sepsis ([facility] [address] 06/01/2016     Priority: Low    Acute renal failure (HCC)      Priority: Low    Non-intractable vomiting with nausea      Priority: Low    Lactic acidemia      Priority: Low    Leukocytosis      Priority: Low    DKA (diabetic ketoacidoses) ([facility] [address]      Priority: Low    Abnormal stress test 02/25/2020    Left axillary hidradenitis     Dilated cardiomyopathy ([facility] [address] 02/20/2020    Pulmonary edema, acute, with congestive heart disease ([facility] [address] 02/19/2020    Elevated troponin 02/19/2020    Essential hypertension 02/19/2020    Pneumonia, interstitial ([address] 04/07/2019    Acute renal failure superimposed on chronic kidney disease ([address] 04/07/2019    Uncontrolled type 2 diabetes mellitus with hyperglycemia ([facility] [address] 04/07/2019    Acute on chronic diastolic congestive heart failure ([facility] [address] 04/04/2019    Leg swelling 03/12/2019    Venous insufficiency 03/12/2019    Chronic venous htn w inflammation of bilateral low extrm 03/12/2019     Imp: CHF/trop/RF/HTN/cardiomyop    Plan:  Breathing reasonable. Cr stable at 2.3. Watch cr. Having no angina. Medical therapy. Continue low dose lasix if ok with renal.  ASA/coreg/statin. No ACE due to [address]. [id number] Maryanne Segura for home when ok with others.     Core Measures:  · Discharge instructions:   · LVEF documented:   · ACEI for LV dysfunction:   · Smoking Cessation:    Bill Hardwick MD 2/25/2020 1:28 PM

## 2020-02-25 NOTE — DISCHARGE INSTR - COC
Continuity of Care Form    Patient Name: Grey Reece   :  1960  MRN:  [id number]    Admit date:  2020  Discharge date:  2020

## 2020-02-25 NOTE — PROGRESS NOTES
Progress Note    Admit Date: 2/19/2020  Day: 7  Diet: DIET RENAL; Carb Control: 4 carb choices (60 gms)/meal    CC:SOB    Interval history: The patient was seen and examined this morning. She stated that her breathing is better, she is not currently on supplemental oxygen. Cardiology saw her scan but have opted not to undergo further evaluation and medically manage. Medications:     Scheduled Meds:   torsemide  40 mg Oral Daily    nystatin   Topical BID    clindamycin   Topical BID    pantoprazole  40 mg Oral QAM AC    insulin glargine  25 Units Subcutaneous Nightly    [Held by provider] amiodarone  200 mg Oral Daily    atorvastatin  40 mg Oral Daily    carvedilol  12.5 mg Oral BID WC    ferrous gluconate  324 mg Oral BID WC    gabapentin  800 mg Oral TID    insulin lispro  0-12 Units Subcutaneous TID WC    insulin lispro  0-6 Units Subcutaneous Nightly    sodium chloride flush  10 mL Intravenous 2 times per day    heparin (porcine)  5,000 Units Subcutaneous 3 times per day    vitamin D  5,000 Units Oral Daily     Continuous Infusions:   dextrose       PRN Meds:ipratropium-albuterol, glucose, dextrose, glucagon (rDNA), dextrose, HYDROcodone-acetaminophen, sodium chloride flush, acetaminophen, acetaminophen, polyethylene glycol, promethazine, ondansetron, perflutren lipid microspheres    Objective:   Vitals:   T-max:  Patient Vitals for the past 8 hrs:   BP Temp Temp src Pulse Resp SpO2 Weight   02/25/20 0441 (!) 154/61 97.9 °F (36.6 °C) Oral 60 18 90 % 266 lb 8.6 oz (120.9 kg)   02/24/20 2359 126/76 97.3 °F (36.3 °C) Oral 63 18 92 % --       Intake/Output Summary (Last 24 hours) at 2/25/2020 0740  Last data filed at 2/25/2020 0559  Gross per 24 hour   Intake 970 ml   Output 1900 ml   Net -930 ml       Physical Exam  Constitutional:       General: She is not in acute distress. Appearance: Normal appearance. She is obese. She is not ill-appearing or diaphoretic.    HENT:      Head: undiagnosed COPD & SHAQ & OHS   - Sleep consult as outpatient   - Pulmonary consult as out patient   - Duonebs   - Keep saturation greater than 90%      Anemia Chronic mixed iron deficiency and chronic disease   Ferrous gluconate     Hypertension  -- Coreg 12.5 mg BID with meals      Paroxysmal A fib  [id number] score=5 Has ATRIA bleeding risk- high therefore not recommended to be on warfarin. HASBLED =2   -- Amiodarone 200 mg PO qd on hold   --Restart home Coreg   --Was previously on Warfarin and Eliquis but had interal bleeding and was removed by cardiologist      Bradycardia: Asymptomatic; Currently HR in the 60's and has received coreg this morning   -EKG   -Put holding parameters on Coreg       LAURIE on CKD 4- improving Creatinine baseline 2  -Nephrology following  -Lasix 40 PO daily- held as patient appears more euvolemic and Cr increasing   -Daily BMP         DMII: patinet had hypoglycemia and lantus dose was changed  -- MDSS  --  Insulin Glargine 25 Units s/c qn   -- Hypoglycemia protocol      HLD  -- Lipitor PO 40 mg qd      GERD  Protonix        Hydradenitis suppurativa  -Surgery was consulted pateitn did not want surgical intervention  -Topical clindamycin      Morbid obesity: BMI 45.9  Diet renal with carb control      Chronic pain  Home norco and gabapentin      Leukocytosis: 14.3 on admission. Patient was afebrile. Resolved   UA ordered that was moderate LE    -Stopped Rocephin     Code Status:  Full Code  FEN: Renal and cardiac   PPX:  SCDs, Heparin   DISPO:  IP  :       Code Status:  FEN:   PPX:   DISPO:     Conception Latesha, PGY-1  02/25/20  7:40 AM    This patient has been staffed and discussed with Lazarus Land, MD.

## 2020-02-25 NOTE — PROGRESS NOTES
Discharge note: Patient has been seen by doctor. Discharge order obtained, and discharge instructions reviewed. Patient educated, using the teach back method, about follow up instructions and discharge instructions. A completed copy of the AVS instructions given to patient and all questions answered. IV catheter removed without complaints, catheter intact, site WNL. Tele removed. Discharged to [facility] via wheel chair with documented belongings. Pt's transported to private residence via [place] [facility] arranged by social work.  Electronically signed by Heri Montano RN on 2/25/2020 at 1:56 PM

## 2020-02-25 NOTE — CARE COORDINATION
Pt discharging home via [facility]. Pt 's son lives with her, she uses no DME and is declining home care at this time. No further needs at this time.     Max Sherman RN, BSN, [address]  Case Management Department  [phone]

## 2020-02-25 NOTE — PROGRESS NOTES
PRN  dextrose 50 % IV solution, PRN  glucagon (rDNA) injection 1 mg, PRN  dextrose 5 % solution, PRN  [Held by provider] amiodarone (CORDARONE) tablet 200 mg, Daily  atorvastatin (LIPITOR) tablet 40 mg, Daily  carvedilol (COREG) tablet 12.5 mg, BID WC  ferrous gluconate 324 (37.5 Fe) MG tablet 324 mg, BID WC  gabapentin (NEURONTIN) capsule 800 mg, TID  HYDROcodone-acetaminophen (NORCO) 7.5-325 MG per tablet 1 tablet, Q6H PRN  insulin lispro (1 Unit Dial) 0-12 Units, TID WC  insulin lispro (1 Unit Dial) 0-6 Units, Nightly  sodium chloride flush 0.9 % injection 10 mL, 2 times per day  sodium chloride flush 0.9 % injection 10 mL, PRN  acetaminophen (TYLENOL) tablet 650 mg, Q6H PRN  acetaminophen (TYLENOL) suppository 650 mg, Q6H PRN  polyethylene glycol (GLYCOLAX) packet 17 g, Daily PRN  promethazine (PHENERGAN) tablet 12.5 mg, Q6H PRN  ondansetron (ZOFRAN) injection 4 mg, Q6H PRN  perflutren lipid microspheres (DEFINITY) injection 1.65 mg, ONCE PRN  heparin (porcine) injection 5,000 Units, 3 times per day  vitamin D (CHOLECALCIFEROL) capsule 5,000 Units, Daily      Physical exam:     Vitals:  BP (!) 166/54   Pulse 60   Temp 98.2 °F (36.8 °C) (Oral)   Resp 20   Ht 5' 5\" (1.651 m)   Wt 266 lb 8.6 oz (120.9 kg)   SpO2 93%   BMI 44.35 kg/m²   Constitutional:  OAA X3 NAD, obese  Skin: no rash, turgor wnl  Heent:  eomi, mmm  Neck: no bruits or jvd noted  Cardiovascular:  S1, S2 reg  Respiratory: CLAB, no w/r/r  Abdomen: soft, nt, nd  Ext: trace lower extremity edema  CNS: Patient is alert and oriented     Data:   Labs:  CBC:   Recent Labs     02/23/20  0806 02/24/20  0521 02/25/20  0442   WBC 8.6 7.9 8.3   HGB 8.3* 8.1* 7.6*    268 253     BMP:    Recent Labs     02/23/20  0806 02/24/20  0521 02/25/20  0442    141 139   K 4.9 4.9 5.1    103 103   CO2 23 24 23   BUN 50* 49* 47*   CREATININE 2.4* 2.4* 2.3*   GLUCOSE 84 124* 140*     Ca/Mg/Phos:   Recent Labs     02/23/20  0806 02/24/20  4136

## 2020-03-10 ENCOUNTER — TELEPHONE (OUTPATIENT)
Dept: CARDIOLOGY CLINIC | Age: 60
End: 2020-03-10

## 2020-03-10 NOTE — TELEPHONE ENCOUNTER
Dr Christin Kumar  would like to speak directly with  dr Blake Matt to discuss patients blood pressure medication please call him to discuss this matter  [phone]

## 2020-03-20 PROBLEM — R79.89 ELEVATED TROPONIN: Status: RESOLVED | Noted: 2020-02-19 | Resolved: 2020-03-20

## 2020-03-20 PROBLEM — R77.8 ELEVATED TROPONIN: Status: RESOLVED | Noted: 2020-02-19 | Resolved: 2020-03-20

## 2020-03-28 ENCOUNTER — HOSPITAL ENCOUNTER (OUTPATIENT)
Age: 60
Setting detail: OBSERVATION
Discharge: HOME | End: 2020-03-30
Attending: EMERGENCY MEDICINE | Admitting: INTERNAL MEDICINE
Payer: MEDICARE

## 2020-03-28 ENCOUNTER — APPOINTMENT (OUTPATIENT)
Dept: GENERAL RADIOLOGY | Age: 60
End: 2020-03-28
Payer: MEDICARE

## 2020-03-28 PROBLEM — N17.9 AKI (ACUTE KIDNEY INJURY) (HCC): Status: ACTIVE | Noted: 2020-03-28

## 2020-03-28 LAB
ANION GAP SERPL CALCULATED.3IONS-SCNC: 14 MMOL/L (ref 3–16)
BASOPHILS ABSOLUTE: 0.1 K/UL (ref 0–0.2)
BASOPHILS RELATIVE PERCENT: 0.9 %
BILIRUBIN URINE: NEGATIVE
BLOOD, URINE: NEGATIVE
BUN BLDV-MCNC: 69 MG/DL (ref 7–20)
CALCIUM SERPL-MCNC: 9.5 MG/DL (ref 8.3–10.6)
CHLORIDE BLD-SCNC: 100 MMOL/L (ref 99–110)
CLARITY: CLEAR
CO2: 25 MMOL/L (ref 21–32)
COLOR: YELLOW
CREAT SERPL-MCNC: 3.5 MG/DL (ref 0.6–1.2)
EOSINOPHILS ABSOLUTE: 0.2 K/UL (ref 0–0.6)
EOSINOPHILS RELATIVE PERCENT: 1.8 %
EPITHELIAL CELLS, UA: NORMAL /HPF (ref 0–5)
GFR AFRICAN AMERICAN: 16
GFR NON-AFRICAN AMERICAN: 13
GLUCOSE BLD-MCNC: 123 MG/DL (ref 70–99)
GLUCOSE BLD-MCNC: 147 MG/DL (ref 70–99)
GLUCOSE BLD-MCNC: 149 MG/DL (ref 70–99)
GLUCOSE URINE: NEGATIVE MG/DL
HCT VFR BLD CALC: 31.4 % (ref 36–48)
HEMOGLOBIN: 9.9 G/DL (ref 12–16)
KETONES, URINE: NEGATIVE MG/DL
LEUKOCYTE ESTERASE, URINE: ABNORMAL
LYMPHOCYTES ABSOLUTE: 1.8 K/UL (ref 1–5.1)
LYMPHOCYTES RELATIVE PERCENT: 20.1 %
MCH RBC QN AUTO: 28.6 PG (ref 26–34)
MCHC RBC AUTO-ENTMCNC: 31.6 G/DL (ref 31–36)
MCV RBC AUTO: 90.5 FL (ref 80–100)
MICROSCOPIC EXAMINATION: YES
MONOCYTES ABSOLUTE: 0.5 K/UL (ref 0–1.3)
MONOCYTES RELATIVE PERCENT: 6 %
NEUTROPHILS ABSOLUTE: 6.5 K/UL (ref 1.7–7.7)
NEUTROPHILS RELATIVE PERCENT: 71.2 %
NITRITE, URINE: NEGATIVE
OSMOLALITY URINE: 409 MOSM/KG (ref 390–1070)
PDW BLD-RTO: 17.1 % (ref 12.4–15.4)
PERFORMED ON: ABNORMAL
PERFORMED ON: ABNORMAL
PH UA: 6 (ref 5–8)
PLATELET # BLD: 266 K/UL (ref 135–450)
PMV BLD AUTO: 9.2 FL (ref 5–10.5)
POTASSIUM REFLEX MAGNESIUM: 4.7 MMOL/L (ref 3.5–5.1)
PRO-BNP: 529 PG/ML (ref 0–124)
PROTEIN UA: ABNORMAL MG/DL
RBC # BLD: 3.47 M/UL (ref 4–5.2)
RBC UA: NORMAL /HPF (ref 0–4)
SODIUM BLD-SCNC: 139 MMOL/L (ref 136–145)
SODIUM URINE: 48 MMOL/L
SPECIFIC GRAVITY UA: 1.01 (ref 1–1.03)
TROPONIN: <0.01 NG/ML
URINE REFLEX TO CULTURE: YES
URINE TYPE: ABNORMAL
UROBILINOGEN, URINE: 0.2 E.U./DL
WBC # BLD: 9.1 K/UL (ref 4–11)
WBC UA: NORMAL /HPF (ref 0–5)

## 2020-03-28 PROCEDURE — 87086 URINE CULTURE/COLONY COUNT: CPT

## 2020-03-28 PROCEDURE — 82570 ASSAY OF URINE CREATININE: CPT

## 2020-03-28 PROCEDURE — 83880 ASSAY OF NATRIURETIC PEPTIDE: CPT

## 2020-03-28 PROCEDURE — 93005 ELECTROCARDIOGRAM TRACING: CPT | Performed by: INTERNAL MEDICINE

## 2020-03-28 PROCEDURE — G0378 HOSPITAL OBSERVATION PER HR: HCPCS

## 2020-03-28 PROCEDURE — 80048 BASIC METABOLIC PNL TOTAL CA: CPT

## 2020-03-28 PROCEDURE — 1200000000 HC SEMI PRIVATE

## 2020-03-28 PROCEDURE — 94799 UNLISTED PULMONARY SVC/PX: CPT

## 2020-03-28 PROCEDURE — 6370000000 HC RX 637 (ALT 250 FOR IP): Performed by: STUDENT IN AN ORGANIZED HEALTH CARE EDUCATION/TRAINING PROGRAM

## 2020-03-28 PROCEDURE — 83935 ASSAY OF URINE OSMOLALITY: CPT

## 2020-03-28 PROCEDURE — 99285 EMERGENCY DEPT VISIT HI MDM: CPT

## 2020-03-28 PROCEDURE — 85025 COMPLETE CBC W/AUTO DIFF WBC: CPT

## 2020-03-28 PROCEDURE — 71046 X-RAY EXAM CHEST 2 VIEWS: CPT

## 2020-03-28 PROCEDURE — 84300 ASSAY OF URINE SODIUM: CPT

## 2020-03-28 PROCEDURE — 81001 URINALYSIS AUTO W/SCOPE: CPT

## 2020-03-28 PROCEDURE — 96361 HYDRATE IV INFUSION ADD-ON: CPT

## 2020-03-28 PROCEDURE — 2580000003 HC RX 258: Performed by: STUDENT IN AN ORGANIZED HEALTH CARE EDUCATION/TRAINING PROGRAM

## 2020-03-28 PROCEDURE — 6370000000 HC RX 637 (ALT 250 FOR IP): Performed by: INTERNAL MEDICINE

## 2020-03-28 PROCEDURE — 84484 ASSAY OF TROPONIN QUANT: CPT

## 2020-03-28 PROCEDURE — 84540 ASSAY OF URINE/UREA-N: CPT

## 2020-03-28 RX ORDER — DEXTROSE MONOHYDRATE 25 G/50ML
12.5 INJECTION, SOLUTION INTRAVENOUS PRN
Status: DISCONTINUED | OUTPATIENT
Start: 2020-03-28 | End: 2020-03-30 | Stop reason: HOSPADM

## 2020-03-28 RX ORDER — ALBUTEROL SULFATE 2.5 MG/3ML
2.5 SOLUTION RESPIRATORY (INHALATION) EVERY 6 HOURS PRN
Status: DISCONTINUED | OUTPATIENT
Start: 2020-03-28 | End: 2020-03-30 | Stop reason: HOSPADM

## 2020-03-28 RX ORDER — ACETAMINOPHEN 325 MG/1
650 TABLET ORAL EVERY 6 HOURS PRN
Status: DISCONTINUED | OUTPATIENT
Start: 2020-03-28 | End: 2020-03-30 | Stop reason: HOSPADM

## 2020-03-28 RX ORDER — NYSTATIN 100000 U/G
CREAM TOPICAL 2 TIMES DAILY
Status: DISCONTINUED | OUTPATIENT
Start: 2020-03-28 | End: 2020-03-30 | Stop reason: HOSPADM

## 2020-03-28 RX ORDER — 0.9 % SODIUM CHLORIDE 0.9 %
500 INTRAVENOUS SOLUTION INTRAVENOUS ONCE
Status: COMPLETED | OUTPATIENT
Start: 2020-03-28 | End: 2020-03-28

## 2020-03-28 RX ORDER — ONDANSETRON 2 MG/ML
4 INJECTION INTRAMUSCULAR; INTRAVENOUS EVERY 6 HOURS PRN
Status: DISCONTINUED | OUTPATIENT
Start: 2020-03-28 | End: 2020-03-30 | Stop reason: HOSPADM

## 2020-03-28 RX ORDER — NICOTINE POLACRILEX 4 MG
15 LOZENGE BUCCAL PRN
Status: DISCONTINUED | OUTPATIENT
Start: 2020-03-28 | End: 2020-03-30 | Stop reason: HOSPADM

## 2020-03-28 RX ORDER — GABAPENTIN 400 MG/1
800 CAPSULE ORAL 3 TIMES DAILY
Status: DISCONTINUED | OUTPATIENT
Start: 2020-03-28 | End: 2020-03-28

## 2020-03-28 RX ORDER — PROMETHAZINE HYDROCHLORIDE 12.5 MG/1
12.5 TABLET ORAL EVERY 6 HOURS PRN
Status: DISCONTINUED | OUTPATIENT
Start: 2020-03-28 | End: 2020-03-30 | Stop reason: HOSPADM

## 2020-03-28 RX ORDER — CARVEDILOL 12.5 MG/1
12.5 TABLET ORAL 2 TIMES DAILY WITH MEALS
Status: DISCONTINUED | OUTPATIENT
Start: 2020-03-28 | End: 2020-03-30 | Stop reason: HOSPADM

## 2020-03-28 RX ORDER — INSULIN LISPRO 100 [IU]/ML
0-3 INJECTION, SOLUTION INTRAVENOUS; SUBCUTANEOUS NIGHTLY
Status: DISCONTINUED | OUTPATIENT
Start: 2020-03-28 | End: 2020-03-30 | Stop reason: HOSPADM

## 2020-03-28 RX ORDER — HEPARIN SODIUM 5000 [USP'U]/ML
5000 INJECTION, SOLUTION INTRAVENOUS; SUBCUTANEOUS EVERY 8 HOURS SCHEDULED
Status: DISCONTINUED | OUTPATIENT
Start: 2020-03-28 | End: 2020-03-30 | Stop reason: HOSPADM

## 2020-03-28 RX ORDER — HYDROCODONE BITARTRATE AND ACETAMINOPHEN 7.5; 325 MG/1; MG/1
1 TABLET ORAL EVERY 4 HOURS PRN
Status: DISCONTINUED | OUTPATIENT
Start: 2020-03-28 | End: 2020-03-30 | Stop reason: HOSPADM

## 2020-03-28 RX ORDER — POLYETHYLENE GLYCOL 3350 17 G/17G
17 POWDER, FOR SOLUTION ORAL DAILY PRN
Status: DISCONTINUED | OUTPATIENT
Start: 2020-03-28 | End: 2020-03-30 | Stop reason: HOSPADM

## 2020-03-28 RX ORDER — DOXYCYCLINE HYCLATE 50 MG/1
324 CAPSULE, GELATIN COATED ORAL 2 TIMES DAILY WITH MEALS
Status: DISCONTINUED | OUTPATIENT
Start: 2020-03-28 | End: 2020-03-28

## 2020-03-28 RX ORDER — POLYETHYLENE GLYCOL 3350 17 G/17G
17 POWDER, FOR SOLUTION ORAL DAILY
Status: DISCONTINUED | OUTPATIENT
Start: 2020-03-28 | End: 2020-03-30 | Stop reason: HOSPADM

## 2020-03-28 RX ORDER — GABAPENTIN 300 MG/1
300 CAPSULE ORAL 3 TIMES DAILY
Status: DISCONTINUED | OUTPATIENT
Start: 2020-03-28 | End: 2020-03-30 | Stop reason: HOSPADM

## 2020-03-28 RX ORDER — ACETAMINOPHEN 650 MG/1
650 SUPPOSITORY RECTAL EVERY 6 HOURS PRN
Status: DISCONTINUED | OUTPATIENT
Start: 2020-03-28 | End: 2020-03-30 | Stop reason: HOSPADM

## 2020-03-28 RX ORDER — DEXTROSE MONOHYDRATE 50 MG/ML
100 INJECTION, SOLUTION INTRAVENOUS PRN
Status: DISCONTINUED | OUTPATIENT
Start: 2020-03-28 | End: 2020-03-30 | Stop reason: HOSPADM

## 2020-03-28 RX ORDER — SODIUM CHLORIDE 0.9 % (FLUSH) 0.9 %
10 SYRINGE (ML) INJECTION PRN
Status: DISCONTINUED | OUTPATIENT
Start: 2020-03-28 | End: 2020-03-30 | Stop reason: HOSPADM

## 2020-03-28 RX ORDER — FAMOTIDINE 20 MG/1
20 TABLET, FILM COATED ORAL DAILY
Status: DISCONTINUED | OUTPATIENT
Start: 2020-03-29 | End: 2020-03-30 | Stop reason: HOSPADM

## 2020-03-28 RX ORDER — FERROUS GLUCONATE 324(37.5)
324 TABLET ORAL
Status: DISCONTINUED | OUTPATIENT
Start: 2020-03-29 | End: 2020-03-30 | Stop reason: HOSPADM

## 2020-03-28 RX ORDER — ATORVASTATIN CALCIUM 40 MG/1
40 TABLET, FILM COATED ORAL NIGHTLY
Status: DISCONTINUED | OUTPATIENT
Start: 2020-03-28 | End: 2020-03-30 | Stop reason: HOSPADM

## 2020-03-28 RX ORDER — INSULIN LISPRO 100 [IU]/ML
0-6 INJECTION, SOLUTION INTRAVENOUS; SUBCUTANEOUS
Status: DISCONTINUED | OUTPATIENT
Start: 2020-03-28 | End: 2020-03-30 | Stop reason: HOSPADM

## 2020-03-28 RX ORDER — SODIUM CHLORIDE 0.9 % (FLUSH) 0.9 %
10 SYRINGE (ML) INJECTION EVERY 12 HOURS SCHEDULED
Status: DISCONTINUED | OUTPATIENT
Start: 2020-03-28 | End: 2020-03-30 | Stop reason: HOSPADM

## 2020-03-28 RX ADMIN — Medication 10 ML: at 22:27

## 2020-03-28 RX ADMIN — GABAPENTIN 800 MG: 400 CAPSULE ORAL at 17:43

## 2020-03-28 RX ADMIN — CARVEDILOL 12.5 MG: 12.5 TABLET, FILM COATED ORAL at 17:42

## 2020-03-28 RX ADMIN — GABAPENTIN 300 MG: 300 CAPSULE ORAL at 22:27

## 2020-03-28 RX ADMIN — NYSTATIN: 100000 CREAM TOPICAL at 22:29

## 2020-03-28 RX ADMIN — SODIUM CHLORIDE 500 ML: 9 INJECTION, SOLUTION INTRAVENOUS at 17:44

## 2020-03-28 RX ADMIN — HYDROCODONE BITARTRATE AND ACETAMINOPHEN 1 TABLET: 7.5; 325 TABLET ORAL at 18:37

## 2020-03-28 RX ADMIN — POLYETHYLENE GLYCOL (3350) 17 G: 17 POWDER, FOR SOLUTION ORAL at 17:44

## 2020-03-28 RX ADMIN — HYDROCODONE BITARTRATE AND ACETAMINOPHEN 1 TABLET: 7.5; 325 TABLET ORAL at 23:12

## 2020-03-28 RX ADMIN — ATORVASTATIN CALCIUM 40 MG: 40 TABLET, FILM COATED ORAL at 22:27

## 2020-03-28 RX ADMIN — INSULIN GLARGINE 25 UNITS: 100 INJECTION, SOLUTION SUBCUTANEOUS at 23:00

## 2020-03-28 RX ADMIN — Medication 5000 UNITS: at 17:42

## 2020-03-28 RX ADMIN — INSULIN LISPRO 1 UNITS: 100 INJECTION, SOLUTION INTRAVENOUS; SUBCUTANEOUS at 23:02

## 2020-03-28 ASSESSMENT — ENCOUNTER SYMPTOMS
DIARRHEA: 1
VOMITING: 0
ABDOMINAL PAIN: 0
CHEST TIGHTNESS: 0
NAUSEA: 0
CONSTIPATION: 0
SINUS PRESSURE: 0
WHEEZING: 0
RHINORRHEA: 0
SHORTNESS OF BREATH: 1
ABDOMINAL DISTENTION: 1
EYES NEGATIVE: 1
COUGH: 0
SORE THROAT: 0

## 2020-03-28 ASSESSMENT — PAIN DESCRIPTION - DESCRIPTORS
DESCRIPTORS: ACHING;CONSTANT
DESCRIPTORS: PATIENT UNABLE TO DESCRIBE
DESCRIPTORS: ACHING

## 2020-03-28 ASSESSMENT — PAIN DESCRIPTION - PROGRESSION
CLINICAL_PROGRESSION: NOT CHANGED
CLINICAL_PROGRESSION: NOT CHANGED

## 2020-03-28 ASSESSMENT — PAIN DESCRIPTION - PAIN TYPE
TYPE: CHRONIC PAIN

## 2020-03-28 ASSESSMENT — PAIN DESCRIPTION - ONSET
ONSET: ON-GOING
ONSET: ON-GOING

## 2020-03-28 ASSESSMENT — PAIN DESCRIPTION - LOCATION
LOCATION: GENERALIZED
LOCATION: GENERALIZED

## 2020-03-28 ASSESSMENT — PAIN SCALES - GENERAL
PAINLEVEL_OUTOF10: 10

## 2020-03-28 ASSESSMENT — PAIN - FUNCTIONAL ASSESSMENT: PAIN_FUNCTIONAL_ASSESSMENT: ACTIVITIES ARE NOT PREVENTED

## 2020-03-28 ASSESSMENT — PAIN DESCRIPTION - FREQUENCY
FREQUENCY: CONTINUOUS
FREQUENCY: CONTINUOUS

## 2020-03-28 ASSESSMENT — PAIN DESCRIPTION - ORIENTATION
ORIENTATION: RIGHT;LEFT
ORIENTATION: RIGHT;LEFT

## 2020-03-28 NOTE — ED PROVIDER NOTES
Neck:      Musculoskeletal: Normal range of motion and neck supple. Cardiovascular:      Rate and Rhythm: Bradycardia present. Pulses: Normal pulses. Heart sounds: Normal heart sounds. No murmur. No friction rub. No gallop. Pulmonary:      Effort: Pulmonary effort is normal. No respiratory distress. Breath sounds: Normal breath sounds. No stridor. No wheezing or rhonchi. Abdominal:      General: Abdomen is flat. There is no distension. Palpations: There is no mass. Tenderness: There is no abdominal tenderness. There is no guarding or rebound. Hernia: No hernia is present. Musculoskeletal: Normal range of motion. Skin:     General: Skin is warm. Capillary Refill: Capillary refill takes less than 2 seconds. Neurological:      General: No focal deficit present. Mental Status: She is alert and oriented to person, place, and time. Psychiatric:         Mood and Affect: Mood normal.         Behavior: Behavior normal.         Thought Content:  Thought content normal.         Judgment: Judgment normal.       DiagnosticResults     EKG   Interpreted in conjunction with emergencydepartment physician Vitaliy Sutton MD  Rhythm: sinus bradycardia  Rate: 50-60  Axis: normal  Ectopy: none  Conduction: normal  ST Segments: no acute change  T Waves:inversion in  v1 and aVr  Q Waves: none  Clinical Impression: no acute changes  Comparison:  Consistent with prior    RADIOLOGY:  XR CHEST STANDARD (2 VW)   Final Result      No acute cardiopulmonary disease          LABS:   Results for orders placed or performed during the hospital encounter of 03/28/20   CBC Auto Differential   Result Value Ref Range    WBC 9.1 4.0 - 11.0 K/uL    RBC 3.47 (L) 4.00 - 5.20 M/uL    Hemoglobin 9.9 (L) 12.0 - 16.0 g/dL    Hematocrit 31.4 (L) 36.0 - 48.0 %    MCV 90.5 80.0 - 100.0 fL    MCH 28.6 26.0 - 34.0 pg    MCHC 31.6 31.0 - 36.0 g/dL    RDW 17.1 (H) 12.4 - 15.4 %    Platelets 396 591 - 789 K/uL no adventitious lung sounds, and no evidence of significant volume overload. History and exam concerning for possible worsening renal function. Mature evaluation with no leukocytosis, stable hemoglobin at 9.9 (improved from prior of 7.6 on 2/25), BUN 69, creatinine 3.5, GFR AA 16 (previously BUN 47, creatinine 2.3, GFR 26 on 2/25/2020), no other significant electrolyte derangements, BNP elevated at 529 (but decreased compared to prior on 2/19/2020), troponin normal.  Chest x-ray with no acute cardiopulmonary disease. EKG with no ischemic change. Ultimately, labs suggest worsening renal function with elevated BUN and creatinine compared to baseline. Given patient's history of CKD and symptoms suggestive of worsening renal function, will plan to admit for further management of her acute kidney injury. I spoke with the attending hospitalist, Dr. Shreya Chester, who agreed with admission. This patient was also evaluated by the attending physician. All care plans werediscussed and agreed upon. Clinical Impression     1. Acute kidney injury ([address]        Disposition     PATIENT REFERRED TO:  No follow-up provider specified.     DISCHARGE MEDICATIONS:  New Prescriptions    No medications on file       Clover Harris MD  Resident  03/28/20 1400

## 2020-03-28 NOTE — PROGRESS NOTES
RESPIRATORY THERAPY ASSESSMENT    Name:  82 Michelle Shahid Record Number:  [id number]  Age: 62 y.o. Gender: female  : 1960  Today's Date:  3/28/2020  Room:  6319/6319-01    Assessment     Is the patient being admitted for a COPD or Asthma exacerbation? No   (If yes the patient will be seen every 4 hours for the first 24 hours and then reassessed)    Patient Admission Diagnosis      Allergies  Allergies   Allergen Reactions    Dicumarol      Other    Percocet [Oxycodone-Acetaminophen] Itching    Warfarin And Related Hives     Other         Minimum Predicted Vital Capacity:     850          Actual Vital Capacity:      1.2L             Pulmonary History: quit smoking 3 yrs ago  Home Oxygen Therapy: none  Home Respiratory Therapy: Albuterol Q6H PRN   Current Respiratory Therapy:  Albuterol Q6H PRN       Respiratory Severity Index(RSI)   Patients with orders for inhalation medications, oxygen, or any therapeutic treatment modality will be placed on Respiratory Protocol. They will be assessed with the first treatment and at least every 72 hours thereafter. The following severity scale will be used to determine frequency of treatment intervention.     Smoking History: Pulmonary Disease or Smoking History, Greater than 15 pack year = 2    Social History  Social History     Tobacco Use    Smoking status: Former Smoker     Types: Cigarettes     Last attempt to quit: 2016     Years since quitting: 3.2    Smokeless tobacco: Never Used   Substance Use Topics    Alcohol use: Yes     Comment: about 1 drink weekly    Drug use: No       Recent Surgical History: None = 0  Past Surgical History  Past Surgical History:   Procedure Laterality Date    COLONOSCOPY      ENDOSCOPY, COLON, DIAGNOSTIC      TUBAL LIGATION         Level of Consciousness: Alert, Oriented, and Cooperative = 0    Level of Activity: Walking with assistance = 1    Respiratory Pattern: Dyspnea with exertion;Irregular pattern;or RR when ANY of the following criteria are met  a. Incognizant or uncooperative          b. Patients treated with HHN at Home        c. Unable to demonstrate proper use of MDI with spacer     d. RR > 30 bpm   5. Bronchodilators will be delivered via Metered Dose Inhaler (MDI), HHN, Aerogen to intubated patients on mechanical ventilation. 6. Inhalation medication orders will be delivered and/or substituted as outlined below. Aerosolized Medications Ordering and Administration Guidelines:    1. All Medications will be ordered by a physician, and their frequency and/or modality will be adjusted as defined by the patients Respiratory Severity Index (RSI) score. 2. If the patient does not have documented COPD, consider discontinuing anticholinergics when RSI is less than 9.  3. If the bronchospasm worsens (increased RSI), then the bronchodilator frequency can be increased to a maximum of every 4 hours. If greater than every 4 hours is required, the physician will be contacted. 4. If the bronchospasm improves, the frequency of the bronchodilator can be decreased, based on the patient's RSI, but not less than home treatment regimen frequency. 5. Bronchodilator(s) will be discontinued if patient has a RSI less than 9 and has received no scheduled or as needed treatment for 72  Hrs. Patients Ordered on a Mucolytic Agent:    1. Must always be administered with a bronchodilator. 2. Discontinue if patient experiences worsened bronchospasm, or secretions have lessened to the point that the patient is able to clear them with a cough. Anti-inflammatory and Combination Medications:    1. If the patient lacks prior history of lung disease, is not using inhaled anti-inflammatory medication at home, and lacks wheezing by examination or by history for at least 24 hours, contact physician for possible discontinuation.

## 2020-03-28 NOTE — PROGRESS NOTES
4 Eyes Admission Assessment     I agree as the admission nurse that 2 RN's have performed a thorough Head to Toe Skin Assessment on the patient. ALL assessment sites listed below have been assessed on admission. Areas assessed by both nurses:   [x]   Head, Face, and Ears   [x]   Shoulders, Back, and Chest  [x]   Arms, Elbows, and Hands   [x]   Coccyx, Sacrum, and Ischum  [x]   Legs, Feet, and Heels        Does the Patient have Skin Breakdown?   No         Luis Fernando Prevention initiated:  No  Wound Care Orders initiated: No      [facility] nurse consulted for Pressure Injury (Stage 3,4, Unstageable, DTI, NWPT, and Complex wounds): No     Nurse 1 eSignature: Electronically signed by Beverley Curran RN on 3/28/20 at 5:11 PM EDT    **SHARE this note so that the co-signing nurse is able to place an eSignature**    Nurse 2 eSignature: {Esignature:[id number]}

## 2020-03-28 NOTE — H&P
global LV hypokinesis. - Daily weights   - Strict I/O  - Encourage PO intake  - Continue home Coreg  - Telemetry  - Daily RFP    Chronic:  HTN  - Home Coreg   IDDM2: A1c 6.4 (2/21/2020)  - Start Lantus 25 U in replace of home 30 U  - Low dose SSI, Hypoglycemia protocol, glucose checks  - Home Gabapentin  HLD  - Continue Lipitor   PAfib: Not on AC patient reports being taken off because of anemia and GI bleed.  VQB3PD1-MUHc Score: 4  - Continue home Coreg  - Telemetry  Chronic Normocytic Anemia: Reports chronic melena and seeing GI outpatient.  - Decrease home Fergon daily  - Daily CBC  Obesity    Will discuss with attending physician Dr. Cedrick Silvestre Status:Full code  FEN: DIET RENAL;  PPX: SQH  DISPO: Jasvir Gan MD  3/28/2020,  1:38 PM

## 2020-03-28 NOTE — PROGRESS NOTES
Patient arrived to room 6319. Placed on telemetry. Call light in reach. Call light education provided. Patient ambulates with steady gait. Calls appropriately.

## 2020-03-29 LAB
ALBUMIN SERPL-MCNC: 4 G/DL (ref 3.4–5)
ANION GAP SERPL CALCULATED.3IONS-SCNC: 12 MMOL/L (ref 3–16)
BASOPHILS ABSOLUTE: 0 K/UL (ref 0–0.2)
BASOPHILS RELATIVE PERCENT: 0.5 %
BUN BLDV-MCNC: 61 MG/DL (ref 7–20)
CALCIUM SERPL-MCNC: 9.2 MG/DL (ref 8.3–10.6)
CHLORIDE BLD-SCNC: 104 MMOL/L (ref 99–110)
CO2: 26 MMOL/L (ref 21–32)
CREAT SERPL-MCNC: 3.2 MG/DL (ref 0.6–1.2)
CREATININE URINE: 111.1 MG/DL (ref 28–259)
EKG ATRIAL RATE: 50 BPM
EKG DIAGNOSIS: NORMAL
EKG P AXIS: 52 DEGREES
EKG P-R INTERVAL: 180 MS
EKG Q-T INTERVAL: 408 MS
EKG QRS DURATION: 84 MS
EKG QTC CALCULATION (BAZETT): 371 MS
EKG R AXIS: 44 DEGREES
EKG T AXIS: 78 DEGREES
EKG VENTRICULAR RATE: 50 BPM
EOSINOPHILS ABSOLUTE: 0.2 K/UL (ref 0–0.6)
EOSINOPHILS RELATIVE PERCENT: 1.9 %
GFR AFRICAN AMERICAN: 18
GFR NON-AFRICAN AMERICAN: 15
GLUCOSE BLD-MCNC: 113 MG/DL (ref 70–99)
GLUCOSE BLD-MCNC: 131 MG/DL (ref 70–99)
GLUCOSE BLD-MCNC: 133 MG/DL (ref 70–99)
GLUCOSE BLD-MCNC: 138 MG/DL (ref 70–99)
GLUCOSE BLD-MCNC: 163 MG/DL (ref 70–99)
HCT VFR BLD CALC: 29.4 % (ref 36–48)
HEMOGLOBIN: 9.4 G/DL (ref 12–16)
LYMPHOCYTES ABSOLUTE: 2.1 K/UL (ref 1–5.1)
LYMPHOCYTES RELATIVE PERCENT: 24.4 %
MCH RBC QN AUTO: 28.8 PG (ref 26–34)
MCHC RBC AUTO-ENTMCNC: 32.1 G/DL (ref 31–36)
MCV RBC AUTO: 89.7 FL (ref 80–100)
MONOCYTES ABSOLUTE: 0.6 K/UL (ref 0–1.3)
MONOCYTES RELATIVE PERCENT: 7.4 %
NEUTROPHILS ABSOLUTE: 5.8 K/UL (ref 1.7–7.7)
NEUTROPHILS RELATIVE PERCENT: 65.8 %
PDW BLD-RTO: 17.2 % (ref 12.4–15.4)
PERFORMED ON: ABNORMAL
PHOSPHORUS: 4.5 MG/DL (ref 2.5–4.9)
PLATELET # BLD: 233 K/UL (ref 135–450)
PMV BLD AUTO: 8.7 FL (ref 5–10.5)
POTASSIUM SERPL-SCNC: 4 MMOL/L (ref 3.5–5.1)
RBC # BLD: 3.28 M/UL (ref 4–5.2)
SODIUM BLD-SCNC: 142 MMOL/L (ref 136–145)
URINE CULTURE, ROUTINE: NORMAL
WBC # BLD: 8.8 K/UL (ref 4–11)

## 2020-03-29 PROCEDURE — 6370000000 HC RX 637 (ALT 250 FOR IP): Performed by: INTERNAL MEDICINE

## 2020-03-29 PROCEDURE — G0378 HOSPITAL OBSERVATION PER HR: HCPCS

## 2020-03-29 PROCEDURE — 6360000002 HC RX W HCPCS: Performed by: STUDENT IN AN ORGANIZED HEALTH CARE EDUCATION/TRAINING PROGRAM

## 2020-03-29 PROCEDURE — 85025 COMPLETE CBC W/AUTO DIFF WBC: CPT

## 2020-03-29 PROCEDURE — 6370000000 HC RX 637 (ALT 250 FOR IP): Performed by: STUDENT IN AN ORGANIZED HEALTH CARE EDUCATION/TRAINING PROGRAM

## 2020-03-29 PROCEDURE — 2580000003 HC RX 258: Performed by: STUDENT IN AN ORGANIZED HEALTH CARE EDUCATION/TRAINING PROGRAM

## 2020-03-29 PROCEDURE — 36415 COLL VENOUS BLD VENIPUNCTURE: CPT

## 2020-03-29 PROCEDURE — 80069 RENAL FUNCTION PANEL: CPT

## 2020-03-29 PROCEDURE — 96374 THER/PROPH/DIAG INJ IV PUSH: CPT

## 2020-03-29 RX ORDER — HEPARIN SODIUM 10000 [USP'U]/100ML
INJECTION, SOLUTION INTRAVENOUS
Status: DISCONTINUED
Start: 2020-03-29 | End: 2020-03-29 | Stop reason: WASHOUT

## 2020-03-29 RX ORDER — SODIUM CHLORIDE 9 MG/ML
INJECTION, SOLUTION INTRAVENOUS CONTINUOUS
Status: DISCONTINUED | OUTPATIENT
Start: 2020-03-29 | End: 2020-03-29

## 2020-03-29 RX ADMIN — NYSTATIN: 100000 CREAM TOPICAL at 21:39

## 2020-03-29 RX ADMIN — POLYETHYLENE GLYCOL (3350) 17 G: 17 POWDER, FOR SOLUTION ORAL at 09:31

## 2020-03-29 RX ADMIN — Medication 10 ML: at 09:32

## 2020-03-29 RX ADMIN — CARVEDILOL 12.5 MG: 12.5 TABLET, FILM COATED ORAL at 17:27

## 2020-03-29 RX ADMIN — HYDROCODONE BITARTRATE AND ACETAMINOPHEN 1 TABLET: 7.5; 325 TABLET ORAL at 15:50

## 2020-03-29 RX ADMIN — GABAPENTIN 300 MG: 300 CAPSULE ORAL at 21:45

## 2020-03-29 RX ADMIN — ATORVASTATIN CALCIUM 40 MG: 40 TABLET, FILM COATED ORAL at 21:46

## 2020-03-29 RX ADMIN — GABAPENTIN 300 MG: 300 CAPSULE ORAL at 15:46

## 2020-03-29 RX ADMIN — GABAPENTIN 300 MG: 300 CAPSULE ORAL at 09:31

## 2020-03-29 RX ADMIN — NYSTATIN: 100000 CREAM TOPICAL at 09:31

## 2020-03-29 RX ADMIN — INSULIN LISPRO 1 UNITS: 100 INJECTION, SOLUTION INTRAVENOUS; SUBCUTANEOUS at 09:34

## 2020-03-29 RX ADMIN — SODIUM CHLORIDE: 9 INJECTION, SOLUTION INTRAVENOUS at 09:40

## 2020-03-29 RX ADMIN — ONDANSETRON 4 MG: 2 INJECTION, SOLUTION INTRAMUSCULAR; INTRAVENOUS at 21:45

## 2020-03-29 RX ADMIN — HYDROCODONE BITARTRATE AND ACETAMINOPHEN 1 TABLET: 7.5; 325 TABLET ORAL at 09:38

## 2020-03-29 RX ADMIN — FAMOTIDINE 20 MG: 20 TABLET, FILM COATED ORAL at 09:31

## 2020-03-29 RX ADMIN — CARVEDILOL 12.5 MG: 12.5 TABLET, FILM COATED ORAL at 09:31

## 2020-03-29 RX ADMIN — HYDROCODONE BITARTRATE AND ACETAMINOPHEN 1 TABLET: 7.5; 325 TABLET ORAL at 21:46

## 2020-03-29 RX ADMIN — Medication 5000 UNITS: at 09:31

## 2020-03-29 ASSESSMENT — PAIN DESCRIPTION - PAIN TYPE
TYPE: CHRONIC PAIN

## 2020-03-29 ASSESSMENT — PAIN DESCRIPTION - PROGRESSION
CLINICAL_PROGRESSION: NOT CHANGED

## 2020-03-29 ASSESSMENT — PAIN DESCRIPTION - LOCATION
LOCATION: LEG;KNEE;FOOT
LOCATION: LEG
LOCATION: LEG;FOOT;KNEE

## 2020-03-29 ASSESSMENT — PAIN DESCRIPTION - FREQUENCY
FREQUENCY: CONTINUOUS

## 2020-03-29 ASSESSMENT — PAIN - FUNCTIONAL ASSESSMENT
PAIN_FUNCTIONAL_ASSESSMENT: ACTIVITIES ARE NOT PREVENTED

## 2020-03-29 ASSESSMENT — PAIN SCALES - GENERAL
PAINLEVEL_OUTOF10: 10
PAINLEVEL_OUTOF10: 7
PAINLEVEL_OUTOF10: 10

## 2020-03-29 ASSESSMENT — PAIN DESCRIPTION - ONSET
ONSET: ON-GOING

## 2020-03-29 ASSESSMENT — PAIN DESCRIPTION - DESCRIPTORS
DESCRIPTORS: DISCOMFORT
DESCRIPTORS: ACHING
DESCRIPTORS: ACHING

## 2020-03-29 ASSESSMENT — PAIN DESCRIPTION - ORIENTATION
ORIENTATION: RIGHT;LEFT

## 2020-03-29 NOTE — PLAN OF CARE
Problem: Pain:  Goal: Pain level will decrease  Description: Pain level will decrease  3/28/2020 1907 by Noemí Duong RN  Outcome: Ongoing  Note: Patient has chronic pain states is always a 10. Patient educated on non pharmacological pain relief measures. Given prn norco patient states helps some. Pain is generalized, in legs, arms, back. Pain is aching in nature. Will monitor. Problem: Pain:  Goal: Control of chronic pain  Description: Control of chronic pain  Note: C/o joint pain in bilat shoulders, knees and hips as well as feet. Given Norco for pain and fell asleep afterward. Will continue to monitor and plan of care. Problem: Falls - Risk of:  Goal: Absence of physical injury  Description: Absence of physical injury  Outcome: Ongoing  Note: At medium risk for falls per [employer] scale. Oriented x 4. Ambulates in room and carter with steady gait. Will continue to monitor and plan of care. Problem: Tissue Perfusion - Renal, Altered:  Goal: Serum creatinine will be within specified parameters  Description: Serum creatinine will be within specified parameters  Outcome: Ongoing  Note: Creatinine 3.5 on admission. Voiding clear yellow urine. Lungs clear. No edema noted. Will continue to monitor and plan of care.

## 2020-03-29 NOTE — PROGRESS NOTES
Progress Note    Admit Date: 3/28/2020  Day: 2  Diet: DIET RENAL;    CC: Worsening SIERRA and black tongue    Interval history: Pt seen and examined at bedside this morning. She is upset that her diet is dental soft and insists that it be changed back. She is also upset at the number of doctors who come in to see her because \"it's really annoying\". She reports chronic nausea but no SOB. No fevers, chills, vomiting, abdominal pain, leg pain/swelling, chest pain or palpitations. HPI: Patient is 63 yo F with PMH of CKD 3, pAfib (not on AC), Type 2 DM, chronic diastolic CHF, HTN and HLD who presents with 1 week of worsening SIERRA. Concerned that her tongue was \"black\" when she woke up this morning. States she has been experiencing bloating and foul smelling belching for which she has been taking Pepto Bismol. Recently discharged from [facility] on 2/25/2020 where she was admitted for an acute on chronic CHF exacerbation. Reports decreased intake today. Patient states she has been taking her Torsemide 40 mg everyday since discharge and is urinating adequately. Goal weight set by Dr Antionette Paul of 260-265 lbs. If over goal weight patient to take an extra Torsemide pill, however patient states she was not checking her weight daily and would get leg cramps if she took an additional Torsemide pill. States she has chronic black tarry stools and resulting anemia to which she has followed up with GI. Denies any CP, n/v/d, cough, f/c or sick contacts.     Medications:     Scheduled Meds:   atorvastatin  40 mg Oral Nightly    carvedilol  12.5 mg Oral BID     famotidine  20 mg Oral Daily    nystatin   Topical BID    polyethylene glycol  17 g Oral Daily    vitamin D  5,000 Units Oral Daily    sodium chloride flush  10 mL Intravenous 2 times per day    heparin (porcine)  5,000 Units Subcutaneous 3 times per day    insulin lispro  0-6 Units Subcutaneous TID     insulin lispro  0-3 Units Subcutaneous Nightly    ferrous gluconate  324 General: No focal deficit present. Mental Status: She is alert. LABS:    CBC:   Recent Labs     03/28/20  1140 03/29/20  0647   WBC 9.1 8.8   HGB 9.9* 9.4*   HCT 31.4* 29.4*    233   MCV 90.5 89.7     Renal:    Recent Labs     03/28/20  1140 03/29/20  0648    142   K 4.7 4.0    104   CO2 25 26   BUN 69* 61*   CREATININE 3.5* 3.2*   GLUCOSE 123* 131*   CALCIUM 9.5 9.2   PHOS  --  4.5   ANIONGAP 14 12     Hepatic:   Recent Labs     03/29/20  0648   LABALBU 4.0     Troponin:   Recent Labs     03/28/20  1140   TROPONINI <0.01     BNP: No results for input(s): BNP in the last 72 hours. Lipids: No results for input(s): CHOL, HDL in the last 72 hours. Invalid input(s): LDLCALCU, TRIGLYCERIDE  ABGs:  No results for input(s): PHART, KFK9BHD, PO2ART, EQV5WAI, BEART, THGBART, J6BMVGLP, CQI1KWS in the last 72 hours. INR: No results for input(s): INR in the last 72 hours. Lactate: No results for input(s): LACTATE in the last 72 hours. Cultures:  -----------------------------------------------------------------  RAD:   XR CHEST STANDARD (2 VW)   Final Result      No acute cardiopulmonary disease          Assessment/Plan:     Acute:     Tongue Discoloration - likely 2/2 pepto bismol, resolving  - Hold pepto bismol    Dyspnea on Exertion: unclear etiology. No clinical of laboratory evidence of fluid overload. States she has been working too hard at home. CXR and trop negative. - Saturating well on room air, will continue to monitor   - Outpatient PFTs and sleep study to r/o OHS/SAHQ, obstructive/restrictive lung disease     LAURIE on CKDIII: likely 2/2 diuretics  Cr of 3.5 on admission, baseline 2.1-2.5. Bladder scan with 44 mL.  Uosm 409, Kaity 48.  - Hold diuretics (home Torsemide)  - Gentle hydration  - Nephrology consulted, appreciate recs  - F/u urine Cr, urea  - Avoid nephrotoxic meds: reduced gabapentin to 300 TID     Chronic Systolic CHF, not in exacerbation: No edema, mucosal membranes

## 2020-03-29 NOTE — PROGRESS NOTES
Occupational Therapy/ Physical Therapy  No Treatment  OT/ PT eval and treat orders received and appreciated, RN reporting pt is up ad carey in room. No therapy needs identified, will dc OT/PT orders. Please re-order if needs arise. Corin Weston.  [address] [facility], OTR/L [address] [facility]

## 2020-03-30 VITALS
WEIGHT: 258 LBS | RESPIRATION RATE: 18 BRPM | HEIGHT: 65 IN | OXYGEN SATURATION: 97 % | BODY MASS INDEX: 42.99 KG/M2 | SYSTOLIC BLOOD PRESSURE: 162 MMHG | HEART RATE: 54 BPM | TEMPERATURE: 97.8 F | DIASTOLIC BLOOD PRESSURE: 77 MMHG

## 2020-03-30 LAB
ALBUMIN SERPL-MCNC: 3.8 G/DL (ref 3.4–5)
ANION GAP SERPL CALCULATED.3IONS-SCNC: 10 MMOL/L (ref 3–16)
BASOPHILS ABSOLUTE: 0 K/UL (ref 0–0.2)
BASOPHILS RELATIVE PERCENT: 0.5 %
BUN BLDV-MCNC: 44 MG/DL (ref 7–20)
CALCIUM SERPL-MCNC: 9.1 MG/DL (ref 8.3–10.6)
CHLORIDE BLD-SCNC: 108 MMOL/L (ref 99–110)
CO2: 26 MMOL/L (ref 21–32)
CREAT SERPL-MCNC: 2.4 MG/DL (ref 0.6–1.2)
EOSINOPHILS ABSOLUTE: 0.2 K/UL (ref 0–0.6)
EOSINOPHILS RELATIVE PERCENT: 2.1 %
GFR AFRICAN AMERICAN: 25
GFR NON-AFRICAN AMERICAN: 20
GLUCOSE BLD-MCNC: 147 MG/DL (ref 70–99)
GLUCOSE BLD-MCNC: 97 MG/DL (ref 70–99)
GLUCOSE BLD-MCNC: 99 MG/DL (ref 70–99)
HCT VFR BLD CALC: 29.9 % (ref 36–48)
HEMOGLOBIN: 9.4 G/DL (ref 12–16)
LYMPHOCYTES ABSOLUTE: 1.9 K/UL (ref 1–5.1)
LYMPHOCYTES RELATIVE PERCENT: 24.4 %
MCH RBC QN AUTO: 28.6 PG (ref 26–34)
MCHC RBC AUTO-ENTMCNC: 31.6 G/DL (ref 31–36)
MCV RBC AUTO: 90.5 FL (ref 80–100)
MONOCYTES ABSOLUTE: 0.6 K/UL (ref 0–1.3)
MONOCYTES RELATIVE PERCENT: 7.4 %
NEUTROPHILS ABSOLUTE: 5.1 K/UL (ref 1.7–7.7)
NEUTROPHILS RELATIVE PERCENT: 65.6 %
PDW BLD-RTO: 17.1 % (ref 12.4–15.4)
PERFORMED ON: ABNORMAL
PERFORMED ON: NORMAL
PHOSPHORUS: 3.9 MG/DL (ref 2.5–4.9)
PLATELET # BLD: 231 K/UL (ref 135–450)
PMV BLD AUTO: 8.7 FL (ref 5–10.5)
POTASSIUM SERPL-SCNC: 4.3 MMOL/L (ref 3.5–5.1)
RBC # BLD: 3.3 M/UL (ref 4–5.2)
SODIUM BLD-SCNC: 144 MMOL/L (ref 136–145)
WBC # BLD: 7.7 K/UL (ref 4–11)

## 2020-03-30 PROCEDURE — 2580000003 HC RX 258: Performed by: STUDENT IN AN ORGANIZED HEALTH CARE EDUCATION/TRAINING PROGRAM

## 2020-03-30 PROCEDURE — 80069 RENAL FUNCTION PANEL: CPT

## 2020-03-30 PROCEDURE — 36415 COLL VENOUS BLD VENIPUNCTURE: CPT

## 2020-03-30 PROCEDURE — 6370000000 HC RX 637 (ALT 250 FOR IP): Performed by: STUDENT IN AN ORGANIZED HEALTH CARE EDUCATION/TRAINING PROGRAM

## 2020-03-30 PROCEDURE — 96376 TX/PRO/DX INJ SAME DRUG ADON: CPT

## 2020-03-30 PROCEDURE — 6360000002 HC RX W HCPCS: Performed by: STUDENT IN AN ORGANIZED HEALTH CARE EDUCATION/TRAINING PROGRAM

## 2020-03-30 PROCEDURE — 6360000002 HC RX W HCPCS: Performed by: INTERNAL MEDICINE

## 2020-03-30 PROCEDURE — 96372 THER/PROPH/DIAG INJ SC/IM: CPT

## 2020-03-30 PROCEDURE — G0378 HOSPITAL OBSERVATION PER HR: HCPCS

## 2020-03-30 PROCEDURE — 6370000000 HC RX 637 (ALT 250 FOR IP): Performed by: INTERNAL MEDICINE

## 2020-03-30 PROCEDURE — 85025 COMPLETE CBC W/AUTO DIFF WBC: CPT

## 2020-03-30 RX ORDER — INSULIN GLARGINE 100 [IU]/ML
25 INJECTION, SOLUTION SUBCUTANEOUS NIGHTLY
Qty: 5 PEN | Refills: 3 | Status: SHIPPED | OUTPATIENT
Start: 2020-03-30

## 2020-03-30 RX ORDER — FERROUS GLUCONATE 324(37.5)
324 TABLET ORAL
Qty: 30 TABLET | Refills: 0 | Status: SHIPPED | OUTPATIENT
Start: 2020-03-31 | End: 2021-12-14

## 2020-03-30 RX ORDER — HYDRALAZINE HYDROCHLORIDE 20 MG/ML
5 INJECTION INTRAMUSCULAR; INTRAVENOUS EVERY 6 HOURS PRN
Status: DISCONTINUED | OUTPATIENT
Start: 2020-03-30 | End: 2020-03-30 | Stop reason: HOSPADM

## 2020-03-30 RX ORDER — TORSEMIDE 20 MG/1
40 TABLET ORAL DAILY
Qty: 30 TABLET | Refills: 3 | Status: SHIPPED | OUTPATIENT
Start: 2020-03-30

## 2020-03-30 RX ORDER — FAMOTIDINE 20 MG/1
20 TABLET, FILM COATED ORAL DAILY
Qty: 60 TABLET | Refills: 3 | Status: SHIPPED | OUTPATIENT
Start: 2020-03-31 | End: 2021-12-14

## 2020-03-30 RX ORDER — GABAPENTIN 300 MG/1
300 CAPSULE ORAL 3 TIMES DAILY
Qty: 90 CAPSULE | Refills: 3 | Status: SHIPPED | OUTPATIENT
Start: 2020-03-30 | End: 2021-12-14

## 2020-03-30 RX ADMIN — ONDANSETRON 4 MG: 2 INJECTION, SOLUTION INTRAMUSCULAR; INTRAVENOUS at 08:32

## 2020-03-30 RX ADMIN — Medication 10 ML: at 08:32

## 2020-03-30 RX ADMIN — HYDROCODONE BITARTRATE AND ACETAMINOPHEN 1 TABLET: 7.5; 325 TABLET ORAL at 08:32

## 2020-03-30 RX ADMIN — POLYETHYLENE GLYCOL (3350) 17 G: 17 POWDER, FOR SOLUTION ORAL at 08:23

## 2020-03-30 RX ADMIN — GABAPENTIN 300 MG: 300 CAPSULE ORAL at 08:22

## 2020-03-30 RX ADMIN — CARVEDILOL 12.5 MG: 12.5 TABLET, FILM COATED ORAL at 08:22

## 2020-03-30 RX ADMIN — Medication 5000 UNITS: at 08:22

## 2020-03-30 RX ADMIN — DARBEPOETIN ALFA 60 MCG: 60 INJECTION, SOLUTION INTRAVENOUS; SUBCUTANEOUS at 12:01

## 2020-03-30 RX ADMIN — NYSTATIN: 100000 CREAM TOPICAL at 10:51

## 2020-03-30 RX ADMIN — FAMOTIDINE 20 MG: 20 TABLET, FILM COATED ORAL at 08:22

## 2020-03-30 RX ADMIN — INSULIN LISPRO 1 UNITS: 100 INJECTION, SOLUTION INTRAVENOUS; SUBCUTANEOUS at 12:16

## 2020-03-30 ASSESSMENT — PAIN DESCRIPTION - PAIN TYPE: TYPE: CHRONIC PAIN

## 2020-03-30 ASSESSMENT — PAIN SCALES - GENERAL
PAINLEVEL_OUTOF10: 6
PAINLEVEL_OUTOF10: 8

## 2020-03-30 ASSESSMENT — PAIN DESCRIPTION - ORIENTATION: ORIENTATION: RIGHT;LEFT

## 2020-03-30 ASSESSMENT — PAIN DESCRIPTION - PROGRESSION
CLINICAL_PROGRESSION: NOT CHANGED

## 2020-03-30 ASSESSMENT — PAIN DESCRIPTION - LOCATION: LOCATION: KNEE;SHOULDER

## 2020-03-30 NOTE — PROGRESS NOTES
D (CHOLECALCIFEROL) capsule 5,000 Units, Daily  sodium chloride flush 0.9 % injection 10 mL, 2 times per day  sodium chloride flush 0.9 % injection 10 mL, PRN  acetaminophen (TYLENOL) tablet 650 mg, Q6H PRN    Or  acetaminophen (TYLENOL) suppository 650 mg, Q6H PRN  polyethylene glycol (GLYCOLAX) packet 17 g, Daily PRN  promethazine (PHENERGAN) tablet 12.5 mg, Q6H PRN    Or  ondansetron (ZOFRAN) injection 4 mg, Q6H PRN  heparin (porcine) injection 5,000 Units, 3 times per day  glucose (GLUTOSE) 40 % oral gel 15 g, PRN  dextrose 50 % IV solution, PRN  glucagon (rDNA) injection 1 mg, PRN  dextrose 5 % solution, PRN  insulin lispro (1 Unit Dial) 0-6 Units, TID WC  insulin lispro (1 Unit Dial) 0-3 Units, Nightly  ferrous gluconate (FERGON) tablet 324 mg, Daily with breakfast  HYDROcodone-acetaminophen (NORCO) 7.5-325 MG per tablet 1 tablet, Q4H PRN  insulin glargine (LANTUS;BASAGLAR) injection pen 25 Units, Nightly  gabapentin (NEURONTIN) capsule 300 mg, TID        Review of Systems:   14 point ROS obtained but were negative except mentioned in HPI      Physical exam:     Vitals:  BP (!) 161/61   Pulse 65   Temp 98.2 °F (36.8 °C) (Oral)   Resp 16   Ht 5' 5\" (1.651 m)   Wt 256 lb 12.8 oz (116.5 kg)   SpO2 97%   BMI 42.73 kg/m²   Constitutional:  OAA X3 NAD  Skin: no rash, turgor wnl  Heent:  eomi, mmm  Neck: no bruits or jvd noted  Cardiovascular:  S1, S2 without m/r/g  Respiratory: CTA B without w/r/r  Abdomen:  +bs, soft, nt, nd  Ext: no  lower extremity edema  Psychiatric: mood and affect appropriate  Musculoskeletal:  Rom, muscular strength intact    Data:   Labs:  CBC:   Recent Labs     03/28/20  1140 03/29/20  0647   WBC 9.1 8.8   HGB 9.9* 9.4*    233     BMP:    Recent Labs     03/28/20  1140 03/29/20  0648    142   K 4.7 4.0    104   CO2 25 26   BUN 69* 61*   CREATININE 3.5* 3.2*   GLUCOSE 123* 131*     Ca/Mg/Phos:   Recent Labs     03/28/20  1140 03/29/20  0648   CALCIUM 9.5 9.2   PHOS --  4.5     Hepatic: No results for input(s): AST, ALT, ALB, BILITOT, ALKPHOS in the last 72 hours. Troponin:   Recent Labs     03/28/20  1140   TROPONINI <0.01     BNP: No results for input(s): BNP in the last 72 hours. Lipids: No results for input(s): CHOL, TRIG, HDL, LDLCALC, LABVLDL in the last 72 hours. ABGs: No results for input(s): PHART, PO2ART, BNX7LIT in the last 72 hours. INR: No results for input(s): INR in the last 72 hours. UA:  Recent Labs     03/28/20 1955   COLORU Yellow   CLARITYU Clear   GLUCOSEU Negative   BILIRUBINUR Negative   KETUA Negative   SPECGRAV 1.015   BLOODU Negative   PHUR 6.0   PROTEINU TRACE*   UROBILINOGEN 0.2   NITRU Negative   LEUKOCYTESUR TRACE*   LABMICR YES   [place] Not Given      Urine Microscopic:   Recent Labs     03/28/20 1955   WBCUA 0-2   RBCUA 0-2   EPIU 2-5     Urine Culture:   Recent Labs     03/28/20 1955   LABURIN <10,000 CFU/ml mixed skin/urogenital leonides. No further workup     Urine Chemistry:   Recent Labs     03/28/20 1955   LABCREA 111.1   NAUR 48             IMAGING:  XR CHEST STANDARD (2 VW)   Final Result      No acute cardiopulmonary disease          Assessment/Plan   1. LAURIE on CKD 3     2. HTN    3. Anemia    4. Acid- base/ Electrolyte imbalance     5.  CHF     Plan   - Pt is not checking her wt at home   - she is below her Dry wt   - hold diuretics   - d.c IVF    - Ur studies - reviewed   - Monitor renal function   - Labs in am   - Aranesp for anemia                 Thank you for allowing us to participate in care of [facility]  free to contact me   [facility] [address]  Office : [phone]  Fax :[phone]

## 2020-03-30 NOTE — PROGRESS NOTES
Progress Note    Admit Date: 3/28/2020  Day: 3  Diet: DIET GENERAL;    CC: SIERRA, tongue discoloration    Interval history: stable overnight, no acute events. Refusing some vitals checks and SQH o/n. This AM she feels well, not dyspneic this AM. She says she had been visiting with her grandkids recently and they have a lot of energy and she may have been more SOB with the increased activity. She says she feels much better now. No complaints this AM. She has been working on losing weight and is encouraged that her weight is lower than before.     Medications:     Scheduled Meds:   darbepoetin nick-polysorbate  60 mcg Subcutaneous Weekly    atorvastatin  40 mg Oral Nightly    carvedilol  12.5 mg Oral BID WC    famotidine  20 mg Oral Daily    nystatin   Topical BID    polyethylene glycol  17 g Oral Daily    vitamin D  5,000 Units Oral Daily    sodium chloride flush  10 mL Intravenous 2 times per day    heparin (porcine)  5,000 Units Subcutaneous 3 times per day    insulin lispro  0-6 Units Subcutaneous TID WC    insulin lispro  0-3 Units Subcutaneous Nightly    ferrous gluconate  324 mg Oral Daily with breakfast    insulin glargine  25 Units Subcutaneous Nightly    gabapentin  300 mg Oral TID     Continuous Infusions:   dextrose       PRN Meds:hydrALAZINE, albuterol, sodium chloride flush, acetaminophen **OR** acetaminophen, polyethylene glycol, promethazine **OR** ondansetron, glucose, dextrose, glucagon (rDNA), dextrose, HYDROcodone-acetaminophen    Objective:   Vitals:   T-max:  Patient Vitals for the past 8 hrs:   BP Temp Temp src Pulse Resp SpO2 Weight   03/30/20 1055 -- -- -- -- -- -- 258 lb (117 kg)   03/30/20 0819 (!) 162/77 97.8 °F (36.6 °C) Oral 54 18 97 % --       Intake/Output Summary (Last 24 hours) at 3/30/2020 1215  Last data filed at 3/30/2020 1049  Gross per 24 hour   Intake 1320 ml   Output 1150 ml   Net 170 ml       Review of Systems   All other systems reviewed and are negative. Physical Exam  Constitutional:       General: She is not in acute distress. HENT:      Head: Normocephalic and atraumatic. Right Ear: External ear normal.      Left Ear: External ear normal.      Nose: Nose normal. No rhinorrhea. Mouth/Throat:      Mouth: Mucous membranes are moist.      Pharynx: Oropharynx is clear. Eyes:      General: No scleral icterus. Right eye: No discharge. Left eye: No discharge. Extraocular Movements: Extraocular movements intact. Conjunctiva/sclera: Conjunctivae normal.   Neck:      Musculoskeletal: Normal range of motion and neck supple. Cardiovascular:      Rate and Rhythm: Normal rate and regular rhythm. Pulses: Normal pulses. Heart sounds: Normal heart sounds. Pulmonary:      Effort: Pulmonary effort is normal.      Breath sounds: Normal breath sounds. Abdominal:      General: There is no distension. Palpations: Abdomen is soft. Tenderness: There is no abdominal tenderness. Musculoskeletal: Normal range of motion. General: No swelling or deformity. Skin:     General: Skin is warm and dry. Neurological:      General: No focal deficit present. Mental Status: She is alert and oriented to person, place, and time. Cranial Nerves: No cranial nerve deficit.    Psychiatric:         Mood and Affect: Mood normal.         Behavior: Behavior normal.         LABS:    CBC:   Recent Labs     03/28/20  1140 03/29/20  0647 03/30/20  0757   WBC 9.1 8.8 7.7   HGB 9.9* 9.4* 9.4*   HCT 31.4* 29.4* 29.9*    233 231   MCV 90.5 89.7 90.5     Renal:    Recent Labs     03/28/20  1140 03/29/20  0648 03/30/20  0757    142 144   K 4.7 4.0 4.3    104 108   CO2 25 26 26   BUN 69* 61* 44*   CREATININE 3.5* 3.2* 2.4*   GLUCOSE 123* 131* 99   CALCIUM 9.5 9.2 9.1   PHOS  --  4.5 3.9   ANIONGAP 14 12 10     Hepatic:   Recent Labs     03/29/20  0648 03/30/20  0757   LABALBU 4.0 3.8     Troponin: Recent Labs     03/28/20  1140   TROPONINI <0.01     BNP: No results for input(s): BNP in the last 72 hours. Lipids: No results for input(s): CHOL, HDL in the last 72 hours. Invalid input(s): LDLCALCU, TRIGLYCERIDE  ABGs:  No results for input(s): PHART, LQC7AJM, PO2ART, IAM6CEF, BEART, THGBART, L8ZIDNVD, DZN4XKM in the last 72 hours. INR: No results for input(s): INR in the last 72 hours. Lactate: No results for input(s): LACTATE in the last 72 hours. Cultures:  -----------------------------------------------------------------  RAD:   XR CHEST STANDARD (2 VW)   Final Result      No acute cardiopulmonary disease          Assessment/Plan:     #LAURIE on CKD3: LAURIE improving. 2/2 overdiuresis at home, creatinine 3.5 on admission baseline around 2.  - holding home torsemide  - off IVF, c/w PO fluids  - avoid nephrotoxic meds, gabapentin reduced to 300mg tid  - c/w aranesp, vitamin d, statin and other supportive care per nephrology  - nephrology following    #Chronic systolic CHF, not in exacerbation: euvolemic, below baseline weight of 260-265. EF 35-40% 2/2020  - holding diuretics due to LAURIE, no ACEI due to RF  - daily weights, strict I/Os  - c/w home coreg - hold for HR < 60, she is slightly bradycardic this AM although asymptomatic  - telemetry    #SIERRA: subjective, unclear etiology, no evidence of fluid overload, troponin not elevated. o2 requirement at baseline RA. - monitor  - outpatient PFTs and sleep study r/o OHS/SHAQ or lung disease if patient amenable - has been refusing    #pAFIB: not on AC due to h/o GI bleed, CHADSVASC 4  - on coreg, stable  - telemetry    #HTN  - coreg w/ hold parameters (for HR < 60), PRN hydralazine    #DM2:  well controlled, a1c 6.4 2/2020  - lantus 25u qhs, LDSSI  - accuchecks, hypoglycemia protocol  - gabapentin for neuropathy, dose reduced to 300mg tid as above    #HLD  - statin    #Chronic anemia: follows w/ GI outpt for chronic melena  - HGB stable- monitor via CBC, no

## 2020-03-31 ENCOUNTER — CARE COORDINATION (OUTPATIENT)
Dept: CARE COORDINATION | Age: 60
End: 2020-03-31

## 2020-03-31 LAB — UREA NITROGEN, UR: 694.8 MG/DL (ref 800–1666)

## 2020-03-31 NOTE — CARE COORDINATION
Spoke to [facility] ([phone]) to order Meals on Wheels for patient. Provided information to her and she will make contact with patient today. Sent Message to Dr. Wiley Grider for patient to receive aranesp medication order from him instead of Hospitalist in order for insurance to cover it. Plan  Inform patient of above information  Contact patient in 2 weeks    Laura Laws.  Reyna Whitaker RN, BSN, [address] [place] [address] Primary Care  [phone]

## 2020-03-31 NOTE — CARE COORDINATION
Spoke to patient over the phone. Provided information that she does not need aranesp medication at this time. Dr. Tiburcio Zavala will discuss it with her when she goes to next office appointment. She said she should receive Meals on Wheels by the end of the week. She was excited and looking forward to it. She had lots of questions for them if she received them daily or weekly. Then she wondered if she got to select the food. Roverto Woodall was going to call her back and she would ask those questions then. Patient did not have any further questions or concerns. She was grateful for help. Plan  Contact patient in 2 weeks    Endy Denise.  Jerry Joel RN, BSN, [address] [place] [address] Primary Care  [phone]

## 2020-04-03 ENCOUNTER — TELEPHONE (OUTPATIENT)
Dept: CARDIOLOGY CLINIC | Age: 60
End: 2020-04-03

## 2020-04-14 ENCOUNTER — CARE COORDINATION (OUTPATIENT)
Dept: CARE COORDINATION | Age: 60
End: 2020-04-14

## 2020-04-30 ENCOUNTER — OFFICE VISIT (OUTPATIENT)
Dept: CARDIOLOGY CLINIC | Age: 60
End: 2020-04-30
Payer: MEDICARE

## 2020-04-30 VITALS
TEMPERATURE: 98.5 F | WEIGHT: 258 LBS | HEART RATE: 60 BPM | DIASTOLIC BLOOD PRESSURE: 80 MMHG | BODY MASS INDEX: 42.93 KG/M2 | SYSTOLIC BLOOD PRESSURE: 124 MMHG

## 2020-04-30 PROCEDURE — 1036F TOBACCO NON-USER: CPT | Performed by: INTERNAL MEDICINE

## 2020-04-30 PROCEDURE — G8427 DOCREV CUR MEDS BY ELIG CLIN: HCPCS | Performed by: INTERNAL MEDICINE

## 2020-04-30 PROCEDURE — 3017F COLORECTAL CA SCREEN DOC REV: CPT | Performed by: INTERNAL MEDICINE

## 2020-04-30 PROCEDURE — 99214 OFFICE O/P EST MOD 30 MIN: CPT | Performed by: INTERNAL MEDICINE

## 2020-04-30 PROCEDURE — G8417 CALC BMI ABV UP PARAM F/U: HCPCS | Performed by: INTERNAL MEDICINE

## 2020-04-30 RX ORDER — DULAGLUTIDE 0.75 MG/.5ML
INJECTION, SOLUTION SUBCUTANEOUS
COMMUNITY
Start: 2020-04-21

## 2020-04-30 NOTE — PROGRESS NOTES
Subjective:      Patient ID: Edel Malik is a 56 y.o. female. HPI Mrs. Colton Forrester is here today for follow up  for afib/BP/abn ekg/chf and hosp stay 2/20. Relatively stable. No chest pain. Rhythm stable. No tachycardia. No syncope. BP doing well at home. No pnd or orthopnea. No palp. Remains off smokes.      Past Medical History:   Diagnosis Date    CH[facility]congest[facility] heart failure) ([facility])     Chronic kidney disease     Diabetes mellitus ([facility] [address]     Hyperlipidemia     Hypertension     MDRO (multiple drug resistant organisms) resistance 6/3/2016    E Coli-urine     Past Surgical History:   Procedure Laterality Date    COLONOSCOPY      ENDOSCOPY, COLON, DIAGNOSTIC      TUBAL LIGATION           Allergies   Allergen Reactions    Dicumarol      Other    Percocet [Oxycodone-Acetaminophen] Itching    Warfarin And Related Hives     Other          Social History     Socioeconomic History    Marital status: Single     Spouse name: Not on file    Number of children: Not on file    Years of education: Not on file    Highest education level: Not on file   Occupational History    Not on file   Social Needs    Financial resource strain: Not on file    Food insecurity     Worry: Not on file     Inability: Not on file    Transportation needs     Medical: Not on file     Non-medical: Not on file   Tobacco Use    Smoking status: Former Smoker     Types: Cigarettes     Last attempt to quit: 12/30/2016     Years since quitting: 3.3    Smokeless tobacco: Never Used   Substance and Sexual Activity    Alcohol use: Yes     Comment: about 1 drink weekly    Drug use: No    Sexual activity: Not on file   Lifestyle    Physical activity     Days per week: Not on file     Minutes per session: Not on file    Stress: Not on file   Relationships    Social connections     Talks on phone: Not on file     Gets together: Not on file     Attends Hindu service: Not on file     Active member of club or organization: Not on file     Attends meetings of clubs or organizations: Not on file     Relationship status: Not on file    Intimate partner violence     Fear of current or ex partner: Not on file     Emotionally abused: Not on file     Physically abused: Not on file     Forced sexual activity: Not on file   Other Topics Concern    Not on file   Social History Narrative    Not on file        FH reviewed, denies FH cardiac issues      Patient  has a past medical history of CHF (congestive heart failure) ([facility] [address] Chronic kidney disease, Diabetes mellitus ([facility] [address] Hyperlipidemia, Hypertension, and MDRO (multiple drug resistant organisms) resistance. Current Outpatient Medications   Medication Sig Dispense Refill    insulin glargine (LANTUS SOLOSTAR) 100 UNIT/ML injection pen Inject 25 Units into the skin nightly 5 pen 3    ferrous gluconate 324 (37.5 Fe) MG TABS Take 1 tablet by mouth daily (with breakfast) 30 tablet 0    famotidine (PEPCID) 20 MG tablet Take 1 tablet by mouth daily 60 tablet 3    darbepoetin nick-polysorbate (ARANESP) 60 MCG/0.3ML SOSY injection Inject 0.3 mLs into the skin once a week 4.2 mL 0    torsemide (DEMADEX) 20 MG tablet Take 2 tablets by mouth daily Take weight daily:  If weight < 255 pounds, do not take any torsemide that day  If weight 255-260 pounds, take 1 tab (20mg)  If weight 260-265 pounds, take 2 tabs (40mg)  If weight > 265 pounds, take 3 tabs (60mg) 30 tablet 3    vitamin D (CHOLECALCIFEROL) 125 MCG (5000 UT) CAPS capsule Take 1 capsule by mouth daily 30 capsule 0    nystatin (MYCOSTATIN) 336651 UNIT/GM cream Apply topically 2 times daily.  1 Tube 1    albuterol sulfate  (90 Base) MCG/ACT inhaler Inhale 2 puffs into the lungs 4 times daily as needed for Wheezing 1 Inhaler 0    polyethylene glycol (GLYCOLAX) packet Take 17 g by mouth daily      carvedilol (COREG) 12.5 MG tablet Take 12.5 mg by mouth 2 times daily (with meals)      HYDROcodone-acetaminophen

## 2020-06-29 ENCOUNTER — TELEPHONE (OUTPATIENT)
Dept: NEPHROLOGY | Age: 60
End: 2020-06-29

## 2020-08-06 ENCOUNTER — OFFICE VISIT (OUTPATIENT)
Dept: CARDIOLOGY CLINIC | Age: 60
End: 2020-08-06
Payer: MEDICARE

## 2020-08-06 VITALS
DIASTOLIC BLOOD PRESSURE: 70 MMHG | SYSTOLIC BLOOD PRESSURE: 120 MMHG | BODY MASS INDEX: 38.94 KG/M2 | OXYGEN SATURATION: 95 % | WEIGHT: 234 LBS | HEART RATE: 71 BPM

## 2020-08-06 DIAGNOSIS — N18.4 CKD (CHRONIC KIDNEY DISEASE) STAGE 4, GFR 15-29 ML/MIN (HCC): ICD-10-CM

## 2020-08-06 LAB
ALBUMIN SERPL-MCNC: 4.6 G/DL (ref 3.4–5)
ANION GAP SERPL CALCULATED.3IONS-SCNC: 17 MMOL/L (ref 3–16)
BUN BLDV-MCNC: 40 MG/DL (ref 7–20)
CALCIUM SERPL-MCNC: 9.9 MG/DL (ref 8.3–10.6)
CHLORIDE BLD-SCNC: 98 MMOL/L (ref 99–110)
CO2: 26 MMOL/L (ref 21–32)
CREAT SERPL-MCNC: 2.8 MG/DL (ref 0.6–1.2)
CREATININE URINE: 181.7 MG/DL (ref 28–259)
GFR AFRICAN AMERICAN: 21
GFR NON-AFRICAN AMERICAN: 17
GLUCOSE BLD-MCNC: 125 MG/DL (ref 70–99)
MICROALBUMIN UR-MCNC: 9.5 MG/DL
MICROALBUMIN/CREAT UR-RTO: 52.3 MG/G (ref 0–30)
PHOSPHORUS: 5 MG/DL (ref 2.5–4.9)
POTASSIUM SERPL-SCNC: 4.1 MMOL/L (ref 3.5–5.1)
PRO-BNP: 1282 PG/ML (ref 0–124)
SODIUM BLD-SCNC: 141 MMOL/L (ref 136–145)

## 2020-08-06 PROCEDURE — 3017F COLORECTAL CA SCREEN DOC REV: CPT | Performed by: INTERNAL MEDICINE

## 2020-08-06 PROCEDURE — G8427 DOCREV CUR MEDS BY ELIG CLIN: HCPCS | Performed by: INTERNAL MEDICINE

## 2020-08-06 PROCEDURE — 99214 OFFICE O/P EST MOD 30 MIN: CPT | Performed by: INTERNAL MEDICINE

## 2020-08-06 PROCEDURE — G8417 CALC BMI ABV UP PARAM F/U: HCPCS | Performed by: INTERNAL MEDICINE

## 2020-08-06 PROCEDURE — 1036F TOBACCO NON-USER: CPT | Performed by: INTERNAL MEDICINE

## 2020-08-06 NOTE — PROGRESS NOTES
Subjective:      Patient ID: Hussein Seth is a 56 y.o. female. HPI Mrs. Ana M Mark is here today for follow up  for afib/BP/abn ekg/chf and hosp stay 2/20. Relatively stable. Breathing much since lost wt. No chest pain. Rhythm stable. No tachycardia. No syncope. BP doing well at home. No pnd or orthopnea. No palp. Remains continues smoke free.      Past Medical History:   Diagnosis Date    [facility]     Chronic kidney disease     Diabetes mellitus ([facility] [address]     Hyperlipidemia     Hypertension     MDRO (multiple drug resistant organisms) resistance 6/3/2016    E Coli-urine     Past Surgical History:   Procedure Laterality Date    COLONOSCOPY      ENDOSCOPY, COLON, DIAGNOSTIC      TUBAL LIGATION           Allergies   Allergen Reactions    Dicumarol      Other    Percocet [Oxycodone-Acetaminophen] Itching    Warfarin And Related Hives     Other          Social History     Socioeconomic History    Marital status: Single     Spouse name: Not on file    Number of children: Not on file    Years of education: Not on file    Highest education level: Not on file   Occupational History    Not on file   Social Needs    Financial resource strain: Not on file    Food insecurity     Worry: Not on file     Inability: Not on file    Transportation needs     Medical: Not on file     Non-medical: Not on file   Tobacco Use    Smoking status: Former Smoker     Types: Cigarettes     Last attempt to quit: 12/30/2016     Years since quitting: 3.6    Smokeless tobacco: Never Used   Substance and Sexual Activity    Alcohol use: Yes     Comment: about 1 drink weekly    Drug use: No    Sexual activity: Not on file   Lifestyle    Physical activity     Days per week: Not on file     Minutes per session: Not on file    Stress: Not on file   Relationships    Social connections     Talks on phone: Not on file     Gets together: Not on file     Attends Synagogue service: Not on file     Active member of club or organization: Not on file     Attends meetings of clubs or organizations: Not on file     Relationship status: Not on file    Intimate partner violence     Fear of current or ex partner: Not on file     Emotionally abused: Not on file     Physically abused: Not on file     Forced sexual activity: Not on file   Other Topics Concern    Not on file   Social History Narrative    Not on file        FH reviewed, denies FH cardiac issues      Patient  has a past medical history of CHF (congestive heart failure) ([facility] [address] Chronic kidney disease, Diabetes mellitus ([facility] [address] Hyperlipidemia, Hypertension, and MDRO (multiple drug resistant organisms) resistance. Current Outpatient Medications   Medication Sig Dispense Refill    TRULICITY 8.77 PA/0.4BS SOPN ADM 0.5 ML SC Q WK      amLODIPine (NORVASC) 5 MG tablet Take 1 tablet by mouth daily 90 tablet 1    gabapentin (NEURONTIN) 300 MG capsule Take 1 capsule by mouth 3 times daily for 30 days. 90 capsule 3    insulin glargine (LANTUS SOLOSTAR) 100 UNIT/ML injection pen Inject 25 Units into the skin nightly 5 pen 3    ferrous gluconate 324 (37.5 Fe) MG TABS Take 1 tablet by mouth daily (with breakfast) 30 tablet 0    famotidine (PEPCID) 20 MG tablet Take 1 tablet by mouth daily 60 tablet 3    darbepoetin nick-polysorbate (ARANESP) 60 MCG/0.3ML SOSY injection Inject 0.3 mLs into the skin once a week (Patient not taking: Reported on 4/30/2020) 4.2 mL 0    torsemide (DEMADEX) 20 MG tablet Take 2 tablets by mouth daily Take weight daily:  If weight < 255 pounds, do not take any torsemide that day  If weight 255-260 pounds, take 1 tab (20mg)  If weight 260-265 pounds, take 2 tabs (40mg)  If weight > 265 pounds, take 3 tabs (60mg) 30 tablet 3    vitamin D (CHOLECALCIFEROL) 125 MCG (5000 UT) CAPS capsule Take 1 capsule by mouth daily 30 capsule 0    nystatin (MYCOSTATIN) 599902 UNIT/GM cream Apply topically 2 times daily.  1 Tube 1    albuterol sulfate  (90 Base) MCG/ACT inhaler Inhale 2 puffs into the lungs 4 times daily as needed for Wheezing 1 Inhaler 0    polyethylene glycol (GLYCOLAX) packet Take 17 g by mouth daily      carvedilol (COREG) 12.5 MG tablet Take 12.5 mg by mouth 2 times daily (with meals)      HYDROcodone-acetaminophen (NORCO) 7.5-325 MG per tablet Take 1 tablet by mouth every 4 hours as needed for Pain.  atorvastatin (LIPITOR) 40 MG tablet Take 40 mg by mouth nightly        No current facility-administered medications for this visit. Vitals:    08/06/20 1037   BP: 120/70   Pulse: 71   SpO2: 95%       Wt 234    Review of Systems   Constitutional: Negative for activity change, appetite change and fatigue. Respiratory: Negative for cough, choking, chest tightness and shortness of breath. Cardiovascular: Negative for chest pain, palpitations and leg swelling. Denies PND or orthopnea. No tachycardia or syncope. Neurological: Negative for dizziness, syncope and headaches. Psychiatric/Behavioral: Negative for agitation, behavioral problems and confusion. All other systems reviewed negative as done. Objective:   Physical Exam   Constitutional: She is oriented to person, place, and time. She appears well-developed and well-nourished. No distress. HENT:   Head: Normocephalic and atraumatic. Eyes: Conjunctivae and EOM are normal. Right eye exhibits no discharge. Left eye exhibits no discharge. Neck: Normal range of motion. No JVD present. Cardiovascular: Normal rate, regular rhythm and normal heart sounds. Exam reveals no gallop. No murmur heard. Pulmonary/Chest: Effort normal and breath sounds normal. No respiratory distress. She has no wheezes. She has no rales. Abdominal: Soft. Bowel sounds are normal. There is no tenderness. Musculoskeletal: Normal range of motion. She exhibits no edema. Neurological: She is alert and oriented to person, place, and time.    Skin: Skin is warm and dry. Psychiatric: She has a normal mood and affect. Her behavior is normal. Thought content normal.       Assessment:       Diagnosis Orders   1. Chronic diastolic congestive heart failure (HCC)     2. Paroxysmal atrial fibrillation ([address]     3. Essential hypertension     4. Abnormal EKG             Plan:      CV stable. Compensated. BP good  Rhythm stable. Continue amio. No ac due to GI bleed. Off  Smoking. wt down significantly and feels much better. No changes. Reviewed previous records and testing including myoview 2/20. Continue to monitor. Follow up 3 months.

## 2020-08-28 ENCOUNTER — TELEPHONE (OUTPATIENT)
Dept: CARDIOLOGY CLINIC | Age: 60
End: 2020-08-28

## 2020-08-28 RX ORDER — AMIODARONE HYDROCHLORIDE 200 MG/1
200 TABLET ORAL DAILY
Qty: 90 TABLET | Refills: 3 | Status: SHIPPED | OUTPATIENT
Start: 2020-08-28 | End: 2021-08-23

## 2020-08-28 NOTE — TELEPHONE ENCOUNTER
Spoke with patient, not sure how this was denied but have sent a refill for her Amiodarone 200mg daily as she was in recently and Dr. Elder Ge did not change any medications and wanted her to continue with Amio.

## 2020-08-28 NOTE — TELEPHONE ENCOUNTER
Pt called in stated medication   Amiodarone HCl 200 mg was called in on 8-24-20. Pt stataed medication request was sent by Pharmacy. Pt would like a call back at [phone] to address this  matter.  Thanks

## 2020-11-03 PROBLEM — N17.9 ACUTE RENAL FAILURE (HCC): Status: RESOLVED | Noted: 2020-11-03 | Resolved: 2020-11-03

## 2020-11-12 ENCOUNTER — VIRTUAL VISIT (OUTPATIENT)
Dept: CARDIOLOGY CLINIC | Age: 60
End: 2020-11-12
Payer: MEDICARE

## 2020-11-12 PROCEDURE — G8417 CALC BMI ABV UP PARAM F/U: HCPCS | Performed by: INTERNAL MEDICINE

## 2020-11-12 PROCEDURE — 1036F TOBACCO NON-USER: CPT | Performed by: INTERNAL MEDICINE

## 2020-11-12 PROCEDURE — G8427 DOCREV CUR MEDS BY ELIG CLIN: HCPCS | Performed by: INTERNAL MEDICINE

## 2020-11-12 PROCEDURE — G8484 FLU IMMUNIZE NO ADMIN: HCPCS | Performed by: INTERNAL MEDICINE

## 2020-11-12 PROCEDURE — 3017F COLORECTAL CA SCREEN DOC REV: CPT | Performed by: INTERNAL MEDICINE

## 2020-11-12 PROCEDURE — 99214 OFFICE O/P EST MOD 30 MIN: CPT | Performed by: INTERNAL MEDICINE

## 2020-11-12 NOTE — PROGRESS NOTES
service: Not on file     Active member of club or organization: Not on file     Attends meetings of clubs or organizations: Not on file     Relationship status: Not on file    Intimate partner violence     Fear of current or ex partner: Not on file     Emotionally abused: Not on file     Physically abused: Not on file     Forced sexual activity: Not on file   Other Topics Concern    Not on file   Social History Narrative    Not on file        FH reviewed, denies FH cardiac issues      Patient  has a past medical history of CHF (congestive heart failure) ([facility] [address] Chronic kidney disease, Diabetes mellitus ([facility] [address] Hyperlipidemia, Hypertension, and MDRO (multiple drug resistant organisms) resistance. Current Outpatient Medications   Medication Sig Dispense Refill    amiodarone (CORDARONE) 200 MG tablet Take 1 tablet by mouth daily 90 tablet 3    TRULICITY 7.24 CT/5.2FU SOPN ADM 0.5 ML SC Q WK      amLODIPine (NORVASC) 5 MG tablet Take 1 tablet by mouth daily 90 tablet 1    insulin glargine (LANTUS SOLOSTAR) 100 UNIT/ML injection pen Inject 25 Units into the skin nightly 5 pen 3    darbepoetin nick-polysorbate (ARANESP) 60 MCG/0.3ML SOSY injection Inject 0.3 mLs into the skin once a week 4.2 mL 0    torsemide (DEMADEX) 20 MG tablet Take 2 tablets by mouth daily Take weight daily:  If weight < 255 pounds, do not take any torsemide that day  If weight 255-260 pounds, take 1 tab (20mg)  If weight 260-265 pounds, take 2 tabs (40mg)  If weight > 265 pounds, take 3 tabs (60mg) 30 tablet 3    vitamin D (CHOLECALCIFEROL) 125 MCG (5000 UT) CAPS capsule Take 1 capsule by mouth daily 30 capsule 0    nystatin (MYCOSTATIN) 920893 UNIT/GM cream Apply topically 2 times daily.  1 Tube 1    albuterol sulfate  (90 Base) MCG/ACT inhaler Inhale 2 puffs into the lungs 4 times daily as needed for Wheezing 1 Inhaler 0    polyethylene glycol (GLYCOLAX) packet Take 17 g by mouth daily      carvedilol (COREG) 12.5 MG tablet Take 12.5 mg by mouth 2 times daily (with meals)      HYDROcodone-acetaminophen (NORCO) 7.5-325 MG per tablet Take 1 tablet by mouth every 4 hours as needed for Pain.  atorvastatin (LIPITOR) 40 MG tablet Take 40 mg by mouth nightly       gabapentin (NEURONTIN) 300 MG capsule Take 1 capsule by mouth 3 times daily for 30 days. 90 capsule 3    ferrous gluconate 324 (37.5 Fe) MG TABS Take 1 tablet by mouth daily (with breakfast) (Patient taking differently: Take 324 mg by mouth 2 times daily ) 30 tablet 0    famotidine (PEPCID) 20 MG tablet Take 1 tablet by mouth daily 60 tablet 3     No current facility-administered medications for this visit. BP  130/58  P  57  DId not weigh self    Review of Systems   Constitutional: Negative for activity change, appetite change and fatigue. Respiratory: Negative for cough, choking, chest tightness and shortness of breath. Cardiovascular: Negative for chest pain, palpitations and leg swelling. Denies PND or orthopnea. No tachycardia or syncope. Neurological: Negative for dizziness, syncope and headaches. Psychiatric/Behavioral: Negative for agitation, behavioral problems and confusion. All other systems reviewed negative as done. No PE since phone visit. Assessment:       Diagnosis Orders   1. Chronic diastolic congestive heart failure (HCC)     2. Paroxysmal atrial fibrillation ([address]     3. Essential hypertension     4. Abnormal EKG             Plan:      Requested and agreed to phone visit. CV stable. Compensated. BP good  Rhythm stable. Continue amio. No ac due to GI bleed. Off  Smoking. wt down significantly and feels much better. No changes. Reviewed previous records and testing including myoview 2/20. Continue to monitor. Follow up 3 months.

## 2021-03-09 DIAGNOSIS — N18.4 CKD (CHRONIC KIDNEY DISEASE) STAGE 4, GFR 15-29 ML/MIN (HCC): ICD-10-CM

## 2021-03-09 LAB
ALBUMIN SERPL-MCNC: 4.3 G/DL (ref 3.4–5)
ANION GAP SERPL CALCULATED.3IONS-SCNC: 13 MMOL/L (ref 3–16)
BASOPHILS ABSOLUTE: 0 K/UL (ref 0–0.2)
BASOPHILS RELATIVE PERCENT: 0.5 %
BUN BLDV-MCNC: 22 MG/DL (ref 7–20)
CALCIUM SERPL-MCNC: 9.5 MG/DL (ref 8.3–10.6)
CHLORIDE BLD-SCNC: 108 MMOL/L (ref 99–110)
CO2: 23 MMOL/L (ref 21–32)
CREAT SERPL-MCNC: 2 MG/DL (ref 0.6–1.2)
EOSINOPHILS ABSOLUTE: 0.1 K/UL (ref 0–0.6)
EOSINOPHILS RELATIVE PERCENT: 0.9 %
GFR AFRICAN AMERICAN: 30
GFR NON-AFRICAN AMERICAN: 25
GLUCOSE BLD-MCNC: 122 MG/DL (ref 70–99)
HCT VFR BLD CALC: 27.7 % (ref 36–48)
HEMOGLOBIN: 9.1 G/DL (ref 12–16)
LYMPHOCYTES ABSOLUTE: 1.7 K/UL (ref 1–5.1)
LYMPHOCYTES RELATIVE PERCENT: 21.3 %
MCH RBC QN AUTO: 29 PG (ref 26–34)
MCHC RBC AUTO-ENTMCNC: 32.8 G/DL (ref 31–36)
MCV RBC AUTO: 88.3 FL (ref 80–100)
MONOCYTES ABSOLUTE: 0.6 K/UL (ref 0–1.3)
MONOCYTES RELATIVE PERCENT: 7.3 %
NEUTROPHILS ABSOLUTE: 5.7 K/UL (ref 1.7–7.7)
NEUTROPHILS RELATIVE PERCENT: 70 %
PDW BLD-RTO: 17.4 % (ref 12.4–15.4)
PHOSPHORUS: 3.6 MG/DL (ref 2.5–4.9)
PLATELET # BLD: 254 K/UL (ref 135–450)
PMV BLD AUTO: 8.6 FL (ref 5–10.5)
POTASSIUM SERPL-SCNC: 4.3 MMOL/L (ref 3.5–5.1)
RBC # BLD: 3.13 M/UL (ref 4–5.2)
SODIUM BLD-SCNC: 144 MMOL/L (ref 136–145)
WBC # BLD: 8.1 K/UL (ref 4–11)

## 2021-03-11 ENCOUNTER — OFFICE VISIT (OUTPATIENT)
Dept: CARDIOLOGY CLINIC | Age: 61
End: 2021-03-11
Payer: MEDICARE

## 2021-03-11 VITALS
DIASTOLIC BLOOD PRESSURE: 72 MMHG | WEIGHT: 226 LBS | HEART RATE: 62 BPM | SYSTOLIC BLOOD PRESSURE: 134 MMHG | TEMPERATURE: 97.7 F | BODY MASS INDEX: 37.61 KG/M2

## 2021-03-11 DIAGNOSIS — I50.32 CHRONIC DIASTOLIC CONGESTIVE HEART FAILURE (HCC): Primary | ICD-10-CM

## 2021-03-11 DIAGNOSIS — I48.0 PAROXYSMAL ATRIAL FIBRILLATION (HCC): ICD-10-CM

## 2021-03-11 DIAGNOSIS — I10 ESSENTIAL HYPERTENSION: ICD-10-CM

## 2021-03-11 DIAGNOSIS — R94.31 ABNORMAL EKG: ICD-10-CM

## 2021-03-11 PROCEDURE — 99213 OFFICE O/P EST LOW 20 MIN: CPT | Performed by: INTERNAL MEDICINE

## 2021-03-11 PROCEDURE — G8417 CALC BMI ABV UP PARAM F/U: HCPCS | Performed by: INTERNAL MEDICINE

## 2021-03-11 PROCEDURE — 93000 ELECTROCARDIOGRAM COMPLETE: CPT | Performed by: INTERNAL MEDICINE

## 2021-03-11 PROCEDURE — G8427 DOCREV CUR MEDS BY ELIG CLIN: HCPCS | Performed by: INTERNAL MEDICINE

## 2021-03-11 PROCEDURE — 1036F TOBACCO NON-USER: CPT | Performed by: INTERNAL MEDICINE

## 2021-03-11 PROCEDURE — G8484 FLU IMMUNIZE NO ADMIN: HCPCS | Performed by: INTERNAL MEDICINE

## 2021-03-11 PROCEDURE — 3017F COLORECTAL CA SCREEN DOC REV: CPT | Performed by: INTERNAL MEDICINE

## 2021-03-11 NOTE — PROGRESS NOTES
Subjective:      Patient ID: Rebeka Ferrera is a 56 y.o. female. HPI Mrs. Lawson Gramajo is here today for follow up  for afib/BP/abn ekg/chf . Feeling good. Has had some sharp stabbing split second discomfort. No exertional cp/sob. Rhythm stable. No tachycardia. No syncope. BP doing well at home. No pnd or orthopnea. No palp. Back to smoking for past month.      Past Medical History:   Diagnosis Date    CHF (congestive heart failure) (HCC)     Chronic kidney disease     Diabetes mellitus ([facility] [address]     Hyperlipidemia     Hypertension     MDRO (multiple drug resistant organisms) resistance 6/3/2016    E Coli-urine     Past Surgical History:   Procedure Laterality Date    COLONOSCOPY      ENDOSCOPY, COLON, DIAGNOSTIC      TUBAL LIGATION           Allergies   Allergen Reactions    Dicumarol      Other    Percocet [Oxycodone-Acetaminophen] Itching    Warfarin And Related Hives     Other          Social History     Socioeconomic History    Marital status: Single     Spouse name: Not on file    Number of children: Not on file    Years of education: Not on file    Highest education level: Not on file   Occupational History    Not on file   Social Needs    Financial resource strain: Not on file    Food insecurity     Worry: Not on file     Inability: Not on file    Transportation needs     Medical: Not on file     Non-medical: Not on file   Tobacco Use    Smoking status: Former Smoker     Types: Cigarettes     Quit date: 2016     Years since quittin.1    Smokeless tobacco: Never Used   Substance and Sexual Activity    Alcohol use: Yes     Comment: about 1 drink weekly    Drug use: No    Sexual activity: Not on file   Lifestyle    Physical activity     Days per week: Not on file     Minutes per session: Not on file    Stress: Not on file   Relationships    Social connections     Talks on phone: Not on file     Gets together: Not on file     Attends Yarsani service: Not on file Active member of club or organization: Not on file     Attends meetings of clubs or organizations: Not on file     Relationship status: Not on file    Intimate partner violence     Fear of current or ex partner: Not on file     Emotionally abused: Not on file     Physically abused: Not on file     Forced sexual activity: Not on file   Other Topics Concern    Not on file   Social History Narrative    Not on file        FH reviewed, denies FH cardiac issues      Patient  has a past medical history of CHF (congestive heart failure) ([facility] [address] Chronic kidney disease, Diabetes mellitus ([facility] [address] Hyperlipidemia, Hypertension, and MDRO (multiple drug resistant organisms) resistance. Current Outpatient Medications   Medication Sig Dispense Refill    amiodarone (CORDARONE) 200 MG tablet Take 1 tablet by mouth daily 90 tablet 3    TRULICITY 6.57 MJ/6.5RM SOPN ADM 0.5 ML SC Q WK      amLODIPine (NORVASC) 5 MG tablet Take 1 tablet by mouth daily 90 tablet 1    insulin glargine (LANTUS SOLOSTAR) 100 UNIT/ML injection pen Inject 25 Units into the skin nightly 5 pen 3    darbepoetin nick-polysorbate (ARANESP) 60 MCG/0.3ML SOSY injection Inject 0.3 mLs into the skin once a week 4.2 mL 0    torsemide (DEMADEX) 20 MG tablet Take 2 tablets by mouth daily Take weight daily:  If weight < 255 pounds, do not take any torsemide that day  If weight 255-260 pounds, take 1 tab (20mg)  If weight 260-265 pounds, take 2 tabs (40mg)  If weight > 265 pounds, take 3 tabs (60mg) 30 tablet 3    vitamin D (CHOLECALCIFEROL) 125 MCG (5000 UT) CAPS capsule Take 1 capsule by mouth daily 30 capsule 0    nystatin (MYCOSTATIN) 916394 UNIT/GM cream Apply topically 2 times daily.  1 Tube 1    albuterol sulfate  (90 Base) MCG/ACT inhaler Inhale 2 puffs into the lungs 4 times daily as needed for Wheezing 1 Inhaler 0    polyethylene glycol (GLYCOLAX) packet Take 17 g by mouth daily      carvedilol (COREG) 12.5 MG tablet Take 12.5 mg by mouth 2 times daily (with meals)      HYDROcodone-acetaminophen (NORCO) 7.5-325 MG per tablet Take 1 tablet by mouth every 4 hours as needed for Pain.  atorvastatin (LIPITOR) 40 MG tablet Take 40 mg by mouth nightly       gabapentin (NEURONTIN) 300 MG capsule Take 1 capsule by mouth 3 times daily for 30 days. 90 capsule 3    ferrous gluconate 324 (37.5 Fe) MG TABS Take 1 tablet by mouth daily (with breakfast) (Patient taking differently: Take 324 mg by mouth 2 times daily ) 30 tablet 0    famotidine (PEPCID) 20 MG tablet Take 1 tablet by mouth daily 60 tablet 3     No current facility-administered medications for this visit. Vitals:    03/11/21 1126   BP: 134/72   Pulse: 62   Temp: 97.7 °F (36.5 °C)       Wt 226    Review of Systems   Constitutional: Negative for activity change, appetite change and fatigue. Respiratory: Negative for cough, choking, chest tightness and shortness of breath. Cardiovascular: Negative for chest pain, palpitations and leg swelling. Denies PND or orthopnea. No tachycardia or syncope. Neurological: Negative for dizziness, syncope and headaches. Psychiatric/Behavioral: Negative for agitation, behavioral problems and confusion. All other systems reviewed negative as done. Objective:   Physical Exam   Constitutional: She is oriented to person, place, and time. She appears well-developed and well-nourished. No distress. HENT:   Head: Normocephalic and atraumatic. Eyes: Conjunctivae and EOM are normal. Right eye exhibits no discharge. Left eye exhibits no discharge. Neck: Normal range of motion. No JVD present. Cardiovascular: Normal rate, regular rhythm and normal heart sounds. Exam reveals no gallop. No murmur heard. Pulmonary/Chest: Effort normal and breath sounds normal. No respiratory distress. She has no wheezes. She has no rales. Abdominal: Soft. Bowel sounds are normal. There is no tenderness. Musculoskeletal: Normal range of motion. She exhibits no edema. Neurological: She is alert and oriented to person, place, and time. Skin: Skin is warm and dry. Psychiatric: She has a normal mood and affect. Her behavior is normal. Thought content normal.       Assessment:       Diagnosis Orders   1. Chronic diastolic congestive heart failure (HCC)     2. Paroxysmal atrial fibrillation ([address]     3. Essential hypertension     4. Abnormal EKG             Plan:      CV stable. Chest sx atypical.  Shooting split second. No exertional sx. Compensated. BP good  Rhythm stable. EKG today shows NSR, NSSTTTW changes. Continue amio. No ac due to GI bleed. Off  Smoking. wt down significantly . No changes. Reviewed previous records and testing including myoview 2/20. Continue to monitor. Follow up 3 months.

## 2021-06-10 ENCOUNTER — OFFICE VISIT (OUTPATIENT)
Dept: CARDIOLOGY CLINIC | Age: 61
End: 2021-06-10
Payer: MEDICARE

## 2021-06-10 VITALS
BODY MASS INDEX: 37.28 KG/M2 | WEIGHT: 224 LBS | SYSTOLIC BLOOD PRESSURE: 118 MMHG | DIASTOLIC BLOOD PRESSURE: 60 MMHG | HEART RATE: 60 BPM

## 2021-06-10 DIAGNOSIS — I48.0 PAROXYSMAL ATRIAL FIBRILLATION (HCC): ICD-10-CM

## 2021-06-10 DIAGNOSIS — R94.31 ABNORMAL EKG: ICD-10-CM

## 2021-06-10 DIAGNOSIS — I10 ESSENTIAL HYPERTENSION: ICD-10-CM

## 2021-06-10 DIAGNOSIS — N18.4 CKD (CHRONIC KIDNEY DISEASE) STAGE 4, GFR 15-29 ML/MIN (HCC): ICD-10-CM

## 2021-06-10 DIAGNOSIS — I50.32 CHRONIC DIASTOLIC CONGESTIVE HEART FAILURE (HCC): Primary | ICD-10-CM

## 2021-06-10 LAB
BASOPHILS ABSOLUTE: 0 K/UL (ref 0–0.2)
BASOPHILS RELATIVE PERCENT: 0.4 %
EOSINOPHILS ABSOLUTE: 0.1 K/UL (ref 0–0.6)
EOSINOPHILS RELATIVE PERCENT: 1.4 %
HCT VFR BLD CALC: 29.8 % (ref 36–48)
HEMOGLOBIN: 9.7 G/DL (ref 12–16)
LYMPHOCYTES ABSOLUTE: 1.9 K/UL (ref 1–5.1)
LYMPHOCYTES RELATIVE PERCENT: 31.7 %
MCH RBC QN AUTO: 29.6 PG (ref 26–34)
MCHC RBC AUTO-ENTMCNC: 32.6 G/DL (ref 31–36)
MCV RBC AUTO: 90.7 FL (ref 80–100)
MONOCYTES ABSOLUTE: 0.7 K/UL (ref 0–1.3)
MONOCYTES RELATIVE PERCENT: 12.2 %
NEUTROPHILS ABSOLUTE: 3.2 K/UL (ref 1.7–7.7)
NEUTROPHILS RELATIVE PERCENT: 54.3 %
PDW BLD-RTO: 18.9 % (ref 12.4–15.4)
PLATELET # BLD: 225 K/UL (ref 135–450)
PMV BLD AUTO: 8.8 FL (ref 5–10.5)
RBC # BLD: 3.28 M/UL (ref 4–5.2)
WBC # BLD: 6 K/UL (ref 4–11)

## 2021-06-10 PROCEDURE — 1036F TOBACCO NON-USER: CPT | Performed by: INTERNAL MEDICINE

## 2021-06-10 PROCEDURE — 99213 OFFICE O/P EST LOW 20 MIN: CPT | Performed by: INTERNAL MEDICINE

## 2021-06-10 PROCEDURE — G8417 CALC BMI ABV UP PARAM F/U: HCPCS | Performed by: INTERNAL MEDICINE

## 2021-06-10 PROCEDURE — 3017F COLORECTAL CA SCREEN DOC REV: CPT | Performed by: INTERNAL MEDICINE

## 2021-06-10 PROCEDURE — G8427 DOCREV CUR MEDS BY ELIG CLIN: HCPCS | Performed by: INTERNAL MEDICINE

## 2021-06-10 NOTE — PROGRESS NOTES
per Session:    Stress:     Feeling of Stress :    Social Connections:     Frequency of Communication with Friends and Family:     Frequency of Social Gatherings with Friends and Family:     Attends Synagogue Services:     Active Member of Clubs or Organizations:     Attends Club or Organization Meetings:     Marital Status:    Intimate Partner Violence:     Fear of Current or Ex-Partner:     Emotionally Abused:     Physically Abused:     Sexually Abused:         FH reviewed, denies FH cardiac issues      Patient  has a past medical history of CHF (congestive heart failure) ([facility] [address] Chronic kidney disease, Diabetes mellitus ([facility] [address] Hyperlipidemia, Hypertension, and MDRO (multiple drug resistant organisms) resistance. Current Outpatient Medications   Medication Sig Dispense Refill    amiodarone (CORDARONE) 200 MG tablet Take 1 tablet by mouth daily 90 tablet 3    TRULICITY 5.46 VN/2.0UC SOPN ADM 0.5 ML SC Q WK      amLODIPine (NORVASC) 5 MG tablet Take 1 tablet by mouth daily 90 tablet 1    gabapentin (NEURONTIN) 300 MG capsule Take 1 capsule by mouth 3 times daily for 30 days.  90 capsule 3    insulin glargine (LANTUS SOLOSTAR) 100 UNIT/ML injection pen Inject 25 Units into the skin nightly 5 pen 3    ferrous gluconate 324 (37.5 Fe) MG TABS Take 1 tablet by mouth daily (with breakfast) (Patient taking differently: Take 324 mg by mouth 2 times daily ) 30 tablet 0    famotidine (PEPCID) 20 MG tablet Take 1 tablet by mouth daily 60 tablet 3    darbepoetin nick-polysorbate (ARANESP) 60 MCG/0.3ML SOSY injection Inject 0.3 mLs into the skin once a week 4.2 mL 0    torsemide (DEMADEX) 20 MG tablet Take 2 tablets by mouth daily Take weight daily:  If weight < 255 pounds, do not take any torsemide that day  If weight 255-260 pounds, take 1 tab (20mg)  If weight 260-265 pounds, take 2 tabs (40mg)  If weight > 265 pounds, take 3 tabs (60mg) 30 tablet 3    vitamin D (CHOLECALCIFEROL) 125 MCG (5000 UT) CAPS

## 2021-06-11 LAB
ALBUMIN SERPL-MCNC: 4.4 G/DL (ref 3.4–5)
ANION GAP SERPL CALCULATED.3IONS-SCNC: 16 MMOL/L (ref 3–16)
BUN BLDV-MCNC: 18 MG/DL (ref 7–20)
CALCIUM SERPL-MCNC: 9.1 MG/DL (ref 8.3–10.6)
CHLORIDE BLD-SCNC: 106 MMOL/L (ref 99–110)
CO2: 21 MMOL/L (ref 21–32)
CREAT SERPL-MCNC: 1.9 MG/DL (ref 0.6–1.2)
CREATININE URINE: 160.1 MG/DL (ref 28–259)
GFR AFRICAN AMERICAN: 32
GFR NON-AFRICAN AMERICAN: 27
GLUCOSE BLD-MCNC: 150 MG/DL (ref 70–99)
MICROALBUMIN UR-MCNC: 25.7 MG/DL
MICROALBUMIN/CREAT UR-RTO: 160.5 MG/G (ref 0–30)
PHOSPHORUS: 4 MG/DL (ref 2.5–4.9)
POTASSIUM SERPL-SCNC: 4.8 MMOL/L (ref 3.5–5.1)
SODIUM BLD-SCNC: 143 MMOL/L (ref 136–145)

## 2021-07-15 ENCOUNTER — HOSPITAL ENCOUNTER (EMERGENCY)
Age: 61
Discharge: HOME | End: 2021-07-15
Attending: EMERGENCY MEDICINE
Payer: MEDICARE

## 2021-07-15 VITALS
OXYGEN SATURATION: 98 % | SYSTOLIC BLOOD PRESSURE: 175 MMHG | BODY MASS INDEX: 36.99 KG/M2 | RESPIRATION RATE: 16 BRPM | TEMPERATURE: 97.7 F | DIASTOLIC BLOOD PRESSURE: 56 MMHG | WEIGHT: 222 LBS | HEIGHT: 65 IN | HEART RATE: 65 BPM

## 2021-07-15 DIAGNOSIS — K59.00 CONSTIPATION, UNSPECIFIED CONSTIPATION TYPE: Primary | ICD-10-CM

## 2021-07-15 LAB
ANION GAP SERPL CALCULATED.3IONS-SCNC: 11 MMOL/L (ref 3–16)
BACTERIA: ABNORMAL /HPF
BASOPHILS ABSOLUTE: 0.1 K/UL (ref 0–0.2)
BASOPHILS RELATIVE PERCENT: 1.1 %
BILIRUBIN URINE: NEGATIVE
BLOOD, URINE: NEGATIVE
BUN BLDV-MCNC: 24 MG/DL (ref 7–20)
CALCIUM SERPL-MCNC: 9.1 MG/DL (ref 8.3–10.6)
CHLORIDE BLD-SCNC: 104 MMOL/L (ref 99–110)
CLARITY: CLEAR
CO2: 23 MMOL/L (ref 21–32)
COLOR: YELLOW
CREAT SERPL-MCNC: 2.1 MG/DL (ref 0.6–1.2)
EOSINOPHILS ABSOLUTE: 0.2 K/UL (ref 0–0.6)
EOSINOPHILS RELATIVE PERCENT: 1.8 %
EPITHELIAL CELLS, UA: ABNORMAL /HPF (ref 0–5)
GFR AFRICAN AMERICAN: 29
GFR NON-AFRICAN AMERICAN: 24
GLUCOSE BLD-MCNC: 113 MG/DL (ref 70–99)
GLUCOSE URINE: NEGATIVE MG/DL
HCT VFR BLD CALC: 30.2 % (ref 36–48)
HEMOGLOBIN: 10 G/DL (ref 12–16)
KETONES, URINE: NEGATIVE MG/DL
LEUKOCYTE ESTERASE, URINE: ABNORMAL
LYMPHOCYTES ABSOLUTE: 2.3 K/UL (ref 1–5.1)
LYMPHOCYTES RELATIVE PERCENT: 24.1 %
MCH RBC QN AUTO: 30.1 PG (ref 26–34)
MCHC RBC AUTO-ENTMCNC: 33 G/DL (ref 31–36)
MCV RBC AUTO: 91.1 FL (ref 80–100)
MICROSCOPIC EXAMINATION: YES
MONOCYTES ABSOLUTE: 0.7 K/UL (ref 0–1.3)
MONOCYTES RELATIVE PERCENT: 7.2 %
NEUTROPHILS ABSOLUTE: 6.3 K/UL (ref 1.7–7.7)
NEUTROPHILS RELATIVE PERCENT: 65.8 %
NITRITE, URINE: NEGATIVE
PDW BLD-RTO: 18.8 % (ref 12.4–15.4)
PH UA: 7.5 (ref 5–8)
PLATELET # BLD: 325 K/UL (ref 135–450)
PMV BLD AUTO: 8.1 FL (ref 5–10.5)
POTASSIUM REFLEX MAGNESIUM: 4.4 MMOL/L (ref 3.5–5.1)
PROTEIN UA: ABNORMAL MG/DL
RBC # BLD: 3.31 M/UL (ref 4–5.2)
RBC UA: ABNORMAL /HPF (ref 0–4)
SODIUM BLD-SCNC: 138 MMOL/L (ref 136–145)
SPECIFIC GRAVITY UA: 1.01 (ref 1–1.03)
URINE TYPE: ABNORMAL
UROBILINOGEN, URINE: 0.2 E.U./DL
WBC # BLD: 9.5 K/UL (ref 4–11)
WBC UA: ABNORMAL /HPF (ref 0–5)

## 2021-07-15 PROCEDURE — 99283 EMERGENCY DEPT VISIT LOW MDM: CPT

## 2021-07-15 PROCEDURE — 36415 COLL VENOUS BLD VENIPUNCTURE: CPT

## 2021-07-15 PROCEDURE — 85025 COMPLETE CBC W/AUTO DIFF WBC: CPT

## 2021-07-15 PROCEDURE — 80048 BASIC METABOLIC PNL TOTAL CA: CPT

## 2021-07-15 PROCEDURE — 81001 URINALYSIS AUTO W/SCOPE: CPT

## 2021-07-15 RX ORDER — BISACODYL 10 MG
10 SUPPOSITORY, RECTAL RECTAL PRN
Qty: 30 SUPPOSITORY | Refills: 0 | Status: SHIPPED | OUTPATIENT
Start: 2021-07-15 | End: 2021-08-14

## 2021-07-15 NOTE — ED NOTES
Pt presents to the ED after having a mammogram today in outpatient today and wishes to check and she if she \"is septic\" Pt reports foul smelling burps for x1 week, denies n/v/d, denies cp sob, denies fevers/chills. Pt reports she was septic in the icu in 2015 and she was burping like this. Pt unable to tell RN what she was septic from at that time. Pt is a/o x4, answering questions appropriately. No s/s of distress noted.       Ramon Dominguez RN  07/15/21 [id number]

## 2021-07-15 NOTE — ED PROVIDER NOTES
[address] [facility] [address] ENCOUNTER          PHYSICIAN ASSISTANT NOTE     Date of evaluation: 7/15/2021    ADDENDUM:      Care of this patient was assumed from [facility]. The patient was seen for Other (burping x1 wk and smells bad thinks she might have sepsis like she did in 2015, no fevers or chills)  . The patient's initial evaluation and plan have been discussed with the prior provider who initially evaluated the patient. Nursing Notes, Past Medical Hx, Past Surgical Hx, Social Hx, Allergies, and Family Hx were all reviewed.     Diagnostic Results     EKG        RADIOLOGY:  No orders to display       LABS:   Results for orders placed or performed during the hospital encounter of 07/15/21   CBC Auto Differential   Result Value Ref Range    WBC 9.5 4.0 - 11.0 K/uL    RBC 3.31 (L) 4.00 - 5.20 M/uL    Hemoglobin 10.0 (L) 12.0 - 16.0 g/dL    Hematocrit 30.2 (L) 36.0 - 48.0 %    MCV 91.1 80.0 - 100.0 fL    MCH 30.1 26.0 - 34.0 pg    MCHC 33.0 31.0 - 36.0 g/dL    RDW 18.8 (H) 12.4 - 15.4 %    Platelets 407 208 - 316 K/uL    MPV 8.1 5.0 - 10.5 fL    Neutrophils % 65.8 %    Lymphocytes % 24.1 %    Monocytes % 7.2 %    Eosinophils % 1.8 %    Basophils % 1.1 %    Neutrophils Absolute 6.3 1.7 - 7.7 K/uL    Lymphocytes Absolute 2.3 1.0 - 5.1 K/uL    Monocytes Absolute 0.7 0.0 - 1.3 K/uL    Eosinophils Absolute 0.2 0.0 - 0.6 K/uL    Basophils Absolute 0.1 0.0 - 0.2 K/uL   Basic Metabolic Panel w/ Reflex to MG   Result Value Ref Range    Sodium 138 136 - 145 mmol/L    Potassium reflex Magnesium 4.4 3.5 - 5.1 mmol/L    Chloride 104 99 - 110 mmol/L    CO2 23 21 - 32 mmol/L    Anion Gap 11 3 - 16    Glucose 113 (H) 70 - 99 mg/dL    BUN 24 (H) 7 - 20 mg/dL    CREATININE 2.1 (H) 0.6 - 1.2 mg/dL    GFR Non-African American 24 (A) >60    GFR  29 (A) >60    Calcium 9.1 8.3 - 10.6 mg/dL   Urinalysis, reflex to microscopic (Lab)   Result Value Ref Range    Color, UA Yellow Straw/Yellow    Clarity, UA Clear Clear    Glucose, Ur Negative Negative mg/dL    Bilirubin Urine Negative Negative    Ketones, Urine Negative Negative mg/dL    Specific Gravity, UA 1.015 1.005 - 1.030    Blood, Urine Negative Negative    pH, UA 7.5 5.0 - 8.0    Protein, UA TRACE (A) Negative mg/dL    Urobilinogen, Urine 0.2 <2.0 E.U./dL    Nitrite, Urine Negative Negative    Leukocyte Esterase, Urine TRACE (A) Negative    Microscopic Examination YES     Urine Type NotGiven    Microscopic Urinalysis   Result Value Ref Range    WBC, UA 0-2 0 - 5 /HPF    RBC, UA None seen 0 - 4 /HPF    Epithelial Cells, UA 0-1 0 - 5 /HPF    Bacteria, UA Rare (A) None Seen /HPF       RECENT VITALS:  BP: (!) 175/56, Temp: 97.7 °F (36.5 °C), Pulse: 65, Resp: 16, SpO2: 98 %     Procedures         ED Course     The patient was given the following medications:  No orders of the defined types were placed in this encounter. CONSULTS:  None    MEDICAL DECISION MAKING / ASSESSMENT / Beverley Aguirre is a 59 y.o. female who presents to the ED with concern for sepsis with the description of foul-smelling burps and flatulence which she relates to prior episode of sepsis. She denies any cough, fevers, chills, nausea, vomiting, abdominal pain or urinary symptoms. The following diagnostics were pending at the time I took over care of this patient: BMP, CBC, UA    BMP showed BUN 24, creatinine 2.1 which is near or below baseline for this patient; CBC showed hemoglobin 10 which is near baseline for this patient; UA was without evidence of infection      This patient's presentation is suggestive of symptoms related to constipation as she reports sometimes having a bowel movement only once per week but reports that she currently has had one within the past 2 to 3 days. She will be discharged in stable condition with instructions to follow-up with her primary care physician.   She will be given return precautions and prescribed Fleet glycerin suppositories to add to her bowel regimen. This patient was also evaluated by the attending physician. All care plans were discussed and agreed upon. Clinical Impression     1. Constipation, unspecified constipation type        Disposition     PATIENT REFERRED TO:  No follow-up provider specified.     DISCHARGE MEDICATIONS:  New Prescriptions    No medications on file       DISPOSITION Decision To Discharge 07/15/2021 06:21:09 PM        Prabhakar Raymond PA-C  07/15/21 [id number]

## 2021-07-15 NOTE — ED NOTES
Bed: A03-03  Expected date:   Expected time:   Means of arrival:   Comments:  Annette Lane RN  07/15/21 1529

## 2021-07-15 NOTE — ED PROVIDER NOTES
[address] [facility] [address] ENCOUNTER          NURSE PRACTITIONER NOTE     Date of evaluation: 7/15/2021    Chief Complaint     Other (burping x1 wk and smells bad thinks she might have sepsis like she did in 2015, no fevers or chills)    History of Present Illness     Odette Coronel is a 59 y.o. female with a PMH of CHF, CKD, DM, HLD, HTN, MDRO UTIs who presents to the ED with for sepsis. She states that she had sepsis in 2016. On chart review, she did have MDR O UTI/pyelonephritis, DKA, sepsis and was hospitalized for multiple days. She states that she has had foul-smelling burps and passing abnormally foul-smelling gas and she states that she had the symptoms before she presented in 2015. She states that she wants to be sure that she does not have sepsis. She denies any cough, fevers, chills, nausea, vomiting, abdominal pain, urinary symptoms    With the exception of the above, there are no aggravating or alleviating factors. Review of Systems     Pertinent positive and negative findings as documented above in HPI, otherwise all other systems were reviewed and negative     Past Medical, Surgical, Family, and Social History     Medical History:   Past Medical History:   Diagnosis Date    [facility]congestive [facility]) [facility] [address]     Chronic kidney disease     Diabetes mellitus ([facility] [address]     Hyperlipidemia     Hypertension     MDRO (multiple drug resistant organisms) resistance 6/3/2016    E Coli-urine       Surgical History:   Past Surgical History:   Procedure Laterality Date    COLONOSCOPY      ENDOSCOPY, COLON, DIAGNOSTIC      TUBAL LIGATION         Social History: She reports that she quit smoking about 4 years ago. Her smoking use included cigarettes. She has never used smokeless tobacco. She reports current alcohol use. She reports that she does not use drugs.     Family History: Her family history includes Diabetes in her mother; Hypertension in her mother; No Known Problems in her BP: (!) 175/56, Temp: 97.7 °F (36.5 °C), Pulse: 65, Resp: 16, SpO2: 98 %     General: 59 y.o. female in no apparent distress, well developed, well nourished, non-toxic appearance. HEENT: Atraumatic, normocephalic. EOMs intact. Neck:  Full range of motion. Chest/pulm: Respiratory rate normal. Speaks in complete sentences, no respiratory distress, lungs CTA bilaterally, no wheezes, rales, rhonchi. Cardiovascular: Heart rate normal. RRR, no murmurs, rubs, gallops     Abdomen: No gross distension. Soft, non-tender, non-peritoneal.     : Deferred. Musculoskeletal: No obvious joint deformity. Ambulates without difficulty. Neuro: A&O x 4. Normal speech without dysarthria or aphasia. Moves all extremities spontaneously and symmetrically. Gait normal without ataxia. Skin: Warm, dry. No obvious rashes, petechiae, or purpura. Psych: Appropriate mood and affect, normal interaction. Diagnostic Results     LABS:   No results found for this visit on 07/15/21. RECENT VITALS:  BP: (!) 175/56, Temp: 97.7 °F (36.5 °C), Pulse: 65, Resp: 16, SpO2: 98 %     Procedures     None     ED Course     Nursing Notes, Past Medical Hx, Past Surgical Hx, Social Hx, Allergies, and Family Hx were reviewed. The patient was given the following medications:  No orders of the defined types were placed in this encounter. CONSULTS:  None    MEDICAL DECISION MAKING / ASSESSMENT / PLAN     Briefly, Rylee Moon is a 59 y.o. female who presented to the emergency department with concern for sepsis. On presentation, is well-appearing, no acute distress. Is afebrile with stable vital signs, several elevated blood pressure, likely secondary to underlying hypertension. Physical exam is unremarkable. My suspicion for sepsis based on her general well appearance and reassuring vital signs is low.   We will perform screening CBC, be MP, and urinalysis    At this time I am going off service and will be signing out care of the patient to my colleague Cruz Green PA-C for further care. CBC, BMP, UA is/are still pending. Oncoming provider responsibilities include following up on pending labs/tests, reassessing patient, and appropriate disposition. Please see medical record for further management and medical decision making. The patient was evaluated by myself and the ED Attending Physician, Dr. Nader Wells. All management and disposition plans were discussed and agreed upon.      BREE Choudhury Ace - CNP, CNP  Department of Emergency Medicine     BREE Choudhury Ace - [place]  07/15/21 [id number]

## 2021-07-15 NOTE — ED PROVIDER NOTES
ED Attending Attestation Note     Date of evaluation: 7/15/2021    This patient was seen by the advance practice provider. I have seen and examined the patient, agree with the workup, evaluation, management and diagnosis. The care plan has been discussed. My assessment reveals an overall well-appearing patient, pleasantly conversational, in no acute distress. The patient presents to the emergency department specifically stating that she is concerned that she may be developing sepsis. She describes that she has frequent belching with a bad smell to it, and also often passes gas with a particularly bad smell to it, and she recalls experiencing similar symptoms in the days leading up to a prolonged hospitalization in 2016 relating to urosepsis, acute kidney injury, and diabetic ketoacidosis. The patient further describes that she has been experiencing significant constipation recently, which she believes is due to the iron supplementation that she takes twice per day. She does take a stool softener, but often still goes several days at a time without having a bowel movement. She is afebrile, moderately hypertensive, with otherwise normal vital signs, and has no infectious symptoms. She has no abdominal pain or tenderness. Basic laboratory studies including urinalysis were ordered, as the patient does have significant chronic medical comorbidities, both to evaluate the status of these comorbidities, and to screen for the possibility of urinary tract infection and possible related infectious complications. Her work-up has been very reassuring. Ultimately, her symptoms are likely related to her significant constipation. We will discharge the patient to follow-up with her primary care provider as an outpatient, with a more aggressive bowel regimen. Katya Rothman MD  07/15/21 [id number]

## 2021-08-23 NOTE — TELEPHONE ENCOUNTER
Requested Prescriptions     Pending Prescriptions Disp Refills    amiodarone (CORDARONE) 200 MG tablet [Pharmacy Med Name: AMIODARONE 200MG TABLETS] 90 tablet 3     Sig: TAKE 1 TABLET BY MOUTH DAILY                Last Office Visit: 6/10/2021     Next Office Visit: 12/14/2021

## 2021-08-25 RX ORDER — AMIODARONE HYDROCHLORIDE 200 MG/1
200 TABLET ORAL DAILY
Qty: 90 TABLET | Refills: 3 | Status: SHIPPED | OUTPATIENT
Start: 2021-08-25 | End: 2022-09-15

## 2021-09-28 ENCOUNTER — HOSPITAL ENCOUNTER (OUTPATIENT)
Dept: GENERAL RADIOLOGY | Age: 61
Discharge: HOME | End: 2021-09-28
Payer: MEDICARE

## 2021-09-28 DIAGNOSIS — M17.11 PRIMARY OSTEOARTHRITIS OF RIGHT KNEE: ICD-10-CM

## 2021-09-28 DIAGNOSIS — M16.12 PRIMARY OSTEOARTHRITIS OF LEFT HIP: ICD-10-CM

## 2021-09-28 DIAGNOSIS — M16.11 PRIMARY OSTEOARTHRITIS OF RIGHT HIP: ICD-10-CM

## 2021-09-28 DIAGNOSIS — M17.12 PRIMARY OSTEOARTHRITIS OF LEFT KNEE: ICD-10-CM

## 2021-09-28 PROCEDURE — 73562 X-RAY EXAM OF KNEE 3: CPT

## 2021-09-28 PROCEDURE — 73522 X-RAY EXAM HIPS BI 3-4 VIEWS: CPT

## 2021-11-26 ENCOUNTER — HOSPITAL ENCOUNTER (OUTPATIENT)
Dept: GENERAL RADIOLOGY | Age: 61
Discharge: HOME | End: 2021-11-26
Payer: MEDICARE

## 2021-11-26 DIAGNOSIS — M25.512 LEFT SHOULDER PAIN, UNSPECIFIED CHRONICITY: ICD-10-CM

## 2021-11-26 PROCEDURE — 73030 X-RAY EXAM OF SHOULDER: CPT

## 2021-12-14 ENCOUNTER — OFFICE VISIT (OUTPATIENT)
Dept: CARDIOLOGY CLINIC | Age: 61
End: 2021-12-14
Payer: MEDICARE

## 2021-12-14 VITALS
SYSTOLIC BLOOD PRESSURE: 130 MMHG | HEART RATE: 62 BPM | DIASTOLIC BLOOD PRESSURE: 64 MMHG | WEIGHT: 236 LBS | HEIGHT: 65 IN | OXYGEN SATURATION: 88 % | BODY MASS INDEX: 39.32 KG/M2

## 2021-12-14 DIAGNOSIS — I50.32 CHRONIC DIASTOLIC CONGESTIVE HEART FAILURE (HCC): Primary | ICD-10-CM

## 2021-12-14 DIAGNOSIS — I10 ESSENTIAL HYPERTENSION: ICD-10-CM

## 2021-12-14 DIAGNOSIS — I48.0 PAROXYSMAL ATRIAL FIBRILLATION (HCC): ICD-10-CM

## 2021-12-14 DIAGNOSIS — R94.31 ABNORMAL EKG: ICD-10-CM

## 2021-12-14 PROCEDURE — 1036F TOBACCO NON-USER: CPT | Performed by: INTERNAL MEDICINE

## 2021-12-14 PROCEDURE — G8484 FLU IMMUNIZE NO ADMIN: HCPCS | Performed by: INTERNAL MEDICINE

## 2021-12-14 PROCEDURE — G8427 DOCREV CUR MEDS BY ELIG CLIN: HCPCS | Performed by: INTERNAL MEDICINE

## 2021-12-14 PROCEDURE — G8417 CALC BMI ABV UP PARAM F/U: HCPCS | Performed by: INTERNAL MEDICINE

## 2021-12-14 PROCEDURE — 3017F COLORECTAL CA SCREEN DOC REV: CPT | Performed by: INTERNAL MEDICINE

## 2021-12-14 PROCEDURE — 99214 OFFICE O/P EST MOD 30 MIN: CPT | Performed by: INTERNAL MEDICINE

## 2021-12-14 NOTE — PROGRESS NOTES
Subjective:      Patient ID: Cas Tristan is a 59 y.o. female. HPI Mrs. Ronan Espinosa is here today for follow up  for afib/BP/abn ekg/chf . Feeling good. No exertional cp/sob. Cough/bronchitis. Rhythm stable. No tachycardia. No syncope. BP doing well at home. Still smoking. No pnd or orthopnea. No palp. Past Medical History:   Diagnosis Date    [facility]     Chronic kidney disease     Diabetes mellitus ([facility] [address]     Hyperlipidemia     Hypertension     MDRO (multiple drug resistant organisms) resistance 6/3/2016    E Coli-urine     Past Surgical History:   Procedure Laterality Date    COLONOSCOPY      ENDOSCOPY, COLON, DIAGNOSTIC      TUBAL LIGATION           Allergies   Allergen Reactions    Dicumarol      Other    Percocet [Oxycodone-Acetaminophen] Itching    Warfarin And Related Hives     Other          Social History     Socioeconomic History    Marital status: Single     Spouse name: Not on file    Number of children: Not on file    Years of education: Not on file    Highest education level: Not on file   Occupational History    Not on file   Tobacco Use    Smoking status: Former Smoker     Types: Cigarettes     Quit date: 2016     Years since quittin.9    Smokeless tobacco: Never Used   Vaping Use    Vaping Use: Never used   Substance and Sexual Activity    Alcohol use: Yes     Comment: about 1 drink weekly    Drug use: No    Sexual activity: Not on file   Other Topics Concern    Not on file   Social History Narrative    Not on file     Social Determinants of Health     Financial Resource Strain:     Difficulty of Paying Living Expenses: Not on file   Food Insecurity:     Worried About Running Out of Food in the Last Year: Not on file    Ozzy of Food in the Last Year: Not on file   Transportation Needs:     Lack of Transportation (Medical): Not on file    Lack of Transportation (Non-Medical):  Not on file   Physical Activity:  Days of Exercise per Week: Not on file    Minutes of Exercise per Session: Not on file   Stress:     Feeling of Stress : Not on file   Social Connections:     Frequency of Communication with Friends and Family: Not on file    Frequency of Social Gatherings with Friends and Family: Not on file    Attends Restorationism Services: Not on file    Active Member of Clubs or Organizations: Not on file    Attends Club or Organization Meetings: Not on file    Marital Status: Not on file   Intimate Partner Violence:     Fear of Current or Ex-Partner: Not on file    Emotionally Abused: Not on file    Physically Abused: Not on file    Sexually Abused: Not on file   Housing Stability:     Unable to Pay for Housing in the Last Year: Not on file    Number of [place] in the Last Year: Not on file    Unstable Housing in the Last Year: Not on file        FH reviewed, denies FH cardiac issues      Patient  has a past medical history of CHF (congestive heart failure) ([facility] [address] Chronic kidney disease, Diabetes mellitus ([facility] [address] Hyperlipidemia, Hypertension, and MDRO (multiple drug resistant organisms) resistance. Current Outpatient Medications   Medication Sig Dispense Refill    amiodarone (CORDARONE) 200 MG tablet TAKE 1 TABLET BY MOUTH DAILY 90 tablet 3    TRULICITY 6.02 HP/2.4SD SOPN ADM 0.5 ML SC Q WK      amLODIPine (NORVASC) 5 MG tablet Take 1 tablet by mouth daily 90 tablet 1    gabapentin (NEURONTIN) 300 MG capsule Take 1 capsule by mouth 3 times daily for 30 days.  90 capsule 3    insulin glargine (LANTUS SOLOSTAR) 100 UNIT/ML injection pen Inject 25 Units into the skin nightly 5 pen 3    ferrous gluconate 324 (37.5 Fe) MG TABS Take 1 tablet by mouth daily (with breakfast) (Patient taking differently: Take 324 mg by mouth 2 times daily ) 30 tablet 0    famotidine (PEPCID) 20 MG tablet Take 1 tablet by mouth daily 60 tablet 3    torsemide (DEMADEX) 20 MG tablet Take 2 tablets by mouth daily Take weight daily:  If weight < 255 pounds, do not take any torsemide that day  If weight 255-260 pounds, take 1 tab (20mg)  If weight 260-265 pounds, take 2 tabs (40mg)  If weight > 265 pounds, take 3 tabs (60mg) 30 tablet 3    vitamin D (CHOLECALCIFEROL) 125 MCG (5000 UT) CAPS capsule Take 1 capsule by mouth daily 30 capsule 0    nystatin (MYCOSTATIN) 557551 UNIT/GM cream Apply topically 2 times daily. 1 Tube 1    albuterol sulfate  (90 Base) MCG/ACT inhaler Inhale 2 puffs into the lungs 4 times daily as needed for Wheezing 1 Inhaler 0    polyethylene glycol (GLYCOLAX) packet Take 17 g by mouth daily      carvedilol (COREG) 12.5 MG tablet Take 12.5 mg by mouth 2 times daily (with meals)      HYDROcodone-acetaminophen (NORCO) 7.5-325 MG per tablet Take 1 tablet by mouth every 4 hours as needed for Pain.  atorvastatin (LIPITOR) 40 MG tablet Take 40 mg by mouth nightly        No current facility-administered medications for this visit. Vitals:    12/14/21 1014   Pulse: 62   SpO2: (!) 88%         Wt 224    Review of Systems   Constitutional: Negative for activity change, appetite change and fatigue. Respiratory: Negative for cough, choking, chest tightness and shortness of breath. Cardiovascular: Negative for chest pain, palpitations and leg swelling. Denies PND or orthopnea. No tachycardia or syncope. Neurological: Negative for dizziness, syncope and headaches. Psychiatric/Behavioral: Negative for agitation, behavioral problems and confusion. All other systems reviewed negative as done. Objective:   Physical Exam   Constitutional: She is oriented to person, place, and time. She appears well-developed and well-nourished. No distress. HENT:   Head: Normocephalic and atraumatic. Eyes: Conjunctivae and EOM are normal. Right eye exhibits no discharge. Left eye exhibits no discharge. Neck: Normal range of motion. No JVD present.    Cardiovascular: Normal rate, regular rhythm and normal heart sounds. Exam reveals no gallop. No murmur heard. Pulmonary/Chest: Effort normal and breath sounds normal. No respiratory distress. Some wheezing. She has no rales. Abdominal: Soft. Bowel sounds are normal. There is no tenderness. Musculoskeletal: Normal range of motion. She exhibits no edema. Neurological: She is alert and oriented to person, place, and time. Skin: Skin is warm and dry. Psychiatric: She has a normal mood and affect. Her behavior is normal. Thought content normal.       Assessment:       Diagnosis Orders   1. Chronic diastolic congestive heart failure (HCC)     2. Paroxysmal atrial fibrillation ([address]     3. Essential hypertension     4. Abnormal EKG             Plan:      CV stable. No exertional sx. Compensated. BP good  Rhythm stable. Continue amio. No ac due to GI bleed. Stop smoking. wt down. Will continue amio for afib. Continue torsemide/coreg for CHF. No changes. Reviewed previous records and testing including myoview 2/20. Continue to monitor. Follow up 6 months.

## 2022-01-15 ENCOUNTER — CLINICAL DOCUMENTATION (OUTPATIENT)
Dept: OTHER | Age: 62
End: 2022-01-15

## 2022-06-02 ENCOUNTER — OFFICE VISIT (OUTPATIENT)
Dept: CARDIOLOGY CLINIC | Age: 62
End: 2022-06-02
Payer: MEDICARE

## 2022-06-02 VITALS
DIASTOLIC BLOOD PRESSURE: 60 MMHG | WEIGHT: 238 LBS | HEART RATE: 68 BPM | SYSTOLIC BLOOD PRESSURE: 114 MMHG | BODY MASS INDEX: 39.61 KG/M2

## 2022-06-02 DIAGNOSIS — I48.0 PAROXYSMAL ATRIAL FIBRILLATION (HCC): ICD-10-CM

## 2022-06-02 DIAGNOSIS — R94.31 ABNORMAL EKG: ICD-10-CM

## 2022-06-02 DIAGNOSIS — I50.32 CHRONIC DIASTOLIC CONGESTIVE HEART FAILURE (HCC): Primary | ICD-10-CM

## 2022-06-02 DIAGNOSIS — I10 ESSENTIAL HYPERTENSION: ICD-10-CM

## 2022-06-02 PROCEDURE — G8427 DOCREV CUR MEDS BY ELIG CLIN: HCPCS | Performed by: INTERNAL MEDICINE

## 2022-06-02 PROCEDURE — 3017F COLORECTAL CA SCREEN DOC REV: CPT | Performed by: INTERNAL MEDICINE

## 2022-06-02 PROCEDURE — 1036F TOBACCO NON-USER: CPT | Performed by: INTERNAL MEDICINE

## 2022-06-02 PROCEDURE — 99214 OFFICE O/P EST MOD 30 MIN: CPT | Performed by: INTERNAL MEDICINE

## 2022-06-02 PROCEDURE — G8417 CALC BMI ABV UP PARAM F/U: HCPCS | Performed by: INTERNAL MEDICINE

## 2022-06-02 NOTE — PROGRESS NOTES
Subjective:      Patient ID: Mayuri Davis is a 57 y.o. female. HPI Mrs. Suha Wadsworth is here today for follow up  for afib/BP/abn ekg/chf . Feeling good other than hip issues. .   No exertional cp/sob. Rhythm stable. No tachycardia. No syncope. BP doing well at home. Still smoking. No pnd or orthopnea. No palp. Past Medical History:   Diagnosis Date    CH[facility] (congest[facility] heart failure) ([facility])     Chronic kidney disease     Diabetes mellitus ([facility] [address]     Hyperlipidemia     Hypertension     MDRO (multiple drug resistant organisms) resistance 6/3/2016    E Coli-urine     Past Surgical History:   Procedure Laterality Date    COLONOSCOPY      ENDOSCOPY, COLON, DIAGNOSTIC      TUBAL LIGATION           Allergies   Allergen Reactions    Dicumarol      Other    Percocet [Oxycodone-Acetaminophen] Itching    Warfarin And Related Hives     Other          Social History     Socioeconomic History    Marital status: Single     Spouse name: Not on file    Number of children: Not on file    Years of education: Not on file    Highest education level: Not on file   Occupational History    Not on file   Tobacco Use    Smoking status: Former Smoker     Types: Cigarettes     Quit date: 2016     Years since quittin.4    Smokeless tobacco: Never Used   Vaping Use    Vaping Use: Never used   Substance and Sexual Activity    Alcohol use: Yes     Comment: about 1 drink weekly    Drug use: No    Sexual activity: Not on file   Other Topics Concern    Not on file   Social History Narrative    Not on file     Social Determinants of Health     Financial Resource Strain:     Difficulty of Paying Living Expenses: Not on file   Food Insecurity:     Worried About Running Out of Food in the Last Year: Not on file    Ozzy of Food in the Last Year: Not on file   Transportation Needs:     Lack of Transportation (Medical): Not on file    Lack of Transportation (Non-Medical):  Not on file   Physical Activity:     Days of Exercise per Week: Not on file    Minutes of Exercise per Session: Not on file   Stress:     Feeling of Stress : Not on file   Social Connections:     Frequency of Communication with Friends and Family: Not on file    Frequency of Social Gatherings with Friends and Family: Not on file    Attends Orthodox Services: Not on file    Active Member of Clubs or Organizations: Not on file    Attends Club or Organization Meetings: Not on file    Marital Status: Not on file   Intimate Partner Violence:     Fear of Current or Ex-Partner: Not on file    Emotionally Abused: Not on file    Physically Abused: Not on file    Sexually Abused: Not on file   Housing Stability:     Unable to Pay for Housing in the Last Year: Not on file    Number of [place] in the Last Year: Not on file    Unstable Housing in the Last Year: Not on file        FH reviewed, denies FH cardiac issues      Patient  has a past medical history of CHF (congestive heart failure) ([facility] [address] Chronic kidney disease, Diabetes mellitus ([facility] [address] Hyperlipidemia, Hypertension, and MDRO (multiple drug resistant organisms) resistance.      Current Outpatient Medications   Medication Sig Dispense Refill    amiodarone (CORDARONE) 200 MG tablet TAKE 1 TABLET BY MOUTH DAILY 90 tablet 3    TRULICITY 7.46 FJ/8.7LS SOPN ADM 0.5 ML SC Q WK      amLODIPine (NORVASC) 5 MG tablet Take 1 tablet by mouth daily 90 tablet 1    insulin glargine (LANTUS SOLOSTAR) 100 UNIT/ML injection pen Inject 25 Units into the skin nightly 5 pen 3    torsemide (DEMADEX) 20 MG tablet Take 2 tablets by mouth daily Take weight daily:  If weight < 255 pounds, do not take any torsemide that day  If weight 255-260 pounds, take 1 tab (20mg)  If weight 260-265 pounds, take 2 tabs (40mg)  If weight > 265 pounds, take 3 tabs (60mg) 30 tablet 3    vitamin D (CHOLECALCIFEROL) 125 MCG (5000 UT) CAPS capsule Take 1 capsule by mouth daily 30 capsule 0    nystatin (MYCOSTATIN) 390532 UNIT/GM cream Apply topically 2 times daily. 1 Tube 1    albuterol sulfate  (90 Base) MCG/ACT inhaler Inhale 2 puffs into the lungs 4 times daily as needed for Wheezing 1 Inhaler 0    polyethylene glycol (GLYCOLAX) packet Take 17 g by mouth daily      carvedilol (COREG) 12.5 MG tablet Take 12.5 mg by mouth 2 times daily (with meals)      HYDROcodone-acetaminophen (NORCO) 7.5-325 MG per tablet Take 1 tablet by mouth every 4 hours as needed for Pain.  atorvastatin (LIPITOR) 40 MG tablet Take 40 mg by mouth nightly       gabapentin (NEURONTIN) 300 MG capsule Take 1 capsule by mouth 3 times daily for 30 days. 90 capsule 3    ferrous gluconate 324 (37.5 Fe) MG TABS Take 1 tablet by mouth daily (with breakfast) (Patient taking differently: Take 324 mg by mouth 2 times daily ) 30 tablet 0    famotidine (PEPCID) 20 MG tablet Take 1 tablet by mouth daily 60 tablet 3     No current facility-administered medications for this visit. Vitals:    06/02/22 1023   BP: 114/60   Pulse: 68       Wt 238    Review of Systems   Constitutional: Negative for activity change, appetite change and fatigue. Respiratory: Negative for cough, choking, chest tightness and shortness of breath. Cardiovascular: Negative for chest pain, palpitations and leg swelling. Denies PND or orthopnea. No tachycardia or syncope. Neurological: Negative for dizziness, syncope and headaches. Psychiatric/Behavioral: Negative for agitation, behavioral problems and confusion. All other systems reviewed negative as done. Objective:   Physical Exam   Constitutional: She is oriented to person, place, and time. She appears well-developed and well-nourished. No distress. HENT:   Head: Normocephalic and atraumatic. Eyes: Conjunctivae and EOM are normal. Right eye exhibits no discharge. Left eye exhibits no discharge. Neck: Normal range of motion. No JVD present.    Cardiovascular: Normal rate,

## 2022-08-25 DIAGNOSIS — N18.4 CKD (CHRONIC KIDNEY DISEASE) STAGE 4, GFR 15-29 ML/MIN (HCC): ICD-10-CM

## 2022-08-25 LAB
ALBUMIN SERPL-MCNC: 4.5 G/DL (ref 3.4–5)
ANION GAP SERPL CALCULATED.3IONS-SCNC: 12 MMOL/L (ref 3–16)
BASOPHILS ABSOLUTE: 0 K/UL (ref 0–0.2)
BASOPHILS RELATIVE PERCENT: 0.6 %
BUN BLDV-MCNC: 33 MG/DL (ref 7–20)
CALCIUM SERPL-MCNC: 8.9 MG/DL (ref 8.3–10.6)
CHLORIDE BLD-SCNC: 105 MMOL/L (ref 99–110)
CO2: 20 MMOL/L (ref 21–32)
CREAT SERPL-MCNC: 2.1 MG/DL (ref 0.6–1.2)
CREATININE URINE: 73.2 MG/DL (ref 28–259)
EOSINOPHILS ABSOLUTE: 0.1 K/UL (ref 0–0.6)
EOSINOPHILS RELATIVE PERCENT: 1.7 %
GFR AFRICAN AMERICAN: 29
GFR NON-AFRICAN AMERICAN: 24
GLUCOSE BLD-MCNC: 112 MG/DL (ref 70–99)
HCT VFR BLD CALC: 28.8 % (ref 36–48)
HEMOGLOBIN: 9.4 G/DL (ref 12–16)
LYMPHOCYTES ABSOLUTE: 1.7 K/UL (ref 1–5.1)
LYMPHOCYTES RELATIVE PERCENT: 20 %
MCH RBC QN AUTO: 29.2 PG (ref 26–34)
MCHC RBC AUTO-ENTMCNC: 32.6 G/DL (ref 31–36)
MCV RBC AUTO: 89.5 FL (ref 80–100)
MICROALBUMIN UR-MCNC: 16.2 MG/DL
MICROALBUMIN/CREAT UR-RTO: 221.3 MG/G (ref 0–30)
MONOCYTES ABSOLUTE: 0.5 K/UL (ref 0–1.3)
MONOCYTES RELATIVE PERCENT: 6.1 %
NEUTROPHILS ABSOLUTE: 6 K/UL (ref 1.7–7.7)
NEUTROPHILS RELATIVE PERCENT: 71.6 %
PDW BLD-RTO: 18.5 % (ref 12.4–15.4)
PHOSPHORUS: 4.5 MG/DL (ref 2.5–4.9)
PLATELET # BLD: 252 K/UL (ref 135–450)
PMV BLD AUTO: 7.4 FL (ref 5–10.5)
POTASSIUM SERPL-SCNC: 5.9 MMOL/L (ref 3.5–5.1)
RBC # BLD: 3.21 M/UL (ref 4–5.2)
SODIUM BLD-SCNC: 137 MMOL/L (ref 136–145)
WBC # BLD: 8.3 K/UL (ref 4–11)

## 2022-09-07 ENCOUNTER — HOSPITAL ENCOUNTER (EMERGENCY)
Age: 62
Discharge: LEFT BEFORE DISCHARGE | End: 2022-09-07
Attending: STUDENT IN AN ORGANIZED HEALTH CARE EDUCATION/TRAINING PROGRAM
Payer: MEDICARE

## 2022-09-07 ENCOUNTER — APPOINTMENT (OUTPATIENT)
Dept: GENERAL RADIOLOGY | Age: 62
End: 2022-09-07
Payer: MEDICARE

## 2022-09-07 VITALS
DIASTOLIC BLOOD PRESSURE: 69 MMHG | HEART RATE: 76 BPM | OXYGEN SATURATION: 98 % | SYSTOLIC BLOOD PRESSURE: 105 MMHG | RESPIRATION RATE: 16 BRPM | TEMPERATURE: 98.4 F

## 2022-09-07 DIAGNOSIS — Z53.21 ELOPED FROM EMERGENCY DEPARTMENT: Primary | ICD-10-CM

## 2022-09-07 DIAGNOSIS — K62.5 BRIGHT RED BLOOD PER RECTUM: ICD-10-CM

## 2022-09-07 PROCEDURE — 99281 EMR DPT VST MAYX REQ PHY/QHP: CPT

## 2022-09-07 ASSESSMENT — PAIN - FUNCTIONAL ASSESSMENT: PAIN_FUNCTIONAL_ASSESSMENT: NONE - DENIES PAIN

## 2022-09-08 NOTE — ED NOTES
RN attempted to obtain IV access. Upon first attempt, pt states she wants RN to remove catheter and find a more experienced RN to get IV. Another RN attempted to obtain access, was unsuccessful due to patient moving.  Pt became verbally abusive towards RN and states she wants another experienced RN to obtain IV access     Lucero Rutherford RN  09/07/22 [id number]

## 2022-09-08 NOTE — ED PROVIDER NOTES
[address]          ATTENDING PHYSICIAN NOTE       Date of evaluation: 9/7/2022    Chief Complaint     Other (Having some bleeding but unsure if the bleeding is from a sore on vagina or rectum)      History of Present Illness     Kourtney Rodriguez is a 57 y.o. female who presents with bright red blood per rectum. She reports using adult diapers due to constant urine leakage that has been chronic problem for her. She describes having some painful skin irritation throughout the entire perineum. She has a history of infection of that region and became worried that this might of occurred because she noted some blood in the diarrhea. She also notes that she has a history of hemorrhoids and that she has been having painful stool passage. She suspects that the hemorrhoids may be bleeding. She strictly denies any vaginal bleeding. She is uncertain if she has had any hematuria. She denies any easy bruising or bleeding. Denies any vomiting of blood or coughing up blood. She denies any intra-abdominal pain, but only skin irritation. She denies any fevers, nausea, vomiting. PMHx: CHF, CKD, diabetes, and as below  SH: Former smoker, occasional alcohol, as below    Review of Systems       ROS:   Positive  as per HPI.   Negative for:    -Constitutional: fevers, chills    -Eyes:   eye pain, eye discharge    -Ears/Nose/Throat: Ear pain, ear discharge    -Cardiovascular: CP, cyanosis    -Respiratory:  cough, SOB    -Gastrointestinal: Abd pain, nausea, vomiting, melena     -Genitourinary: dysuria, urinary frequency    -Neurological: numbness or weakness    -Skin:   Rash, pruritis,     -Hematologic: easy bleeding, easy bruising    -Musculoskeletal:  joint swelling, joint redness    Past Medical, Surgical, Family, and Social History     She has a past medical history of CHF (congestive heart failure) ([address] Chronic kidney disease, Diabetes mellitus ([address] Hyperlipidemia, Hypertension, and MDRO (multiple drug resistant organisms) resistance. She has a past surgical history that includes Colonoscopy; Endoscopy, colon, diagnostic; and Tubal ligation. Her family history includes Diabetes in her mother; Hypertension in her mother; No Known Problems in her brother, father, maternal grandfather, maternal grandmother, paternal grandfather, paternal grandmother, sister, and another family member. She reports that she quit smoking about 5 years ago. Her smoking use included cigarettes. She has never used smokeless tobacco. She reports current alcohol use. She reports that she does not use drugs. Medications     Previous Medications    ALBUTEROL SULFATE  (90 BASE) MCG/ACT INHALER    Inhale 2 puffs into the lungs 4 times daily as needed for Wheezing    AMIODARONE (CORDARONE) 200 MG TABLET    TAKE 1 TABLET BY MOUTH DAILY    AMLODIPINE (NORVASC) 5 MG TABLET    Take 1 tablet by mouth daily    ATORVASTATIN (LIPITOR) 40 MG TABLET    Take 40 mg by mouth nightly     CARVEDILOL (COREG) 12.5 MG TABLET    Take 12.5 mg by mouth 2 times daily (with meals)    FAMOTIDINE (PEPCID) 20 MG TABLET    Take 1 tablet by mouth daily    FERROUS GLUCONATE 324 (37.5 FE) MG TABS    Take 1 tablet by mouth daily (with breakfast)    GABAPENTIN (NEURONTIN) 300 MG CAPSULE    Take 1 capsule by mouth 3 times daily for 30 days. HYDROCODONE-ACETAMINOPHEN (NORCO) 7.5-325 MG PER TABLET    Take 1 tablet by mouth every 4 hours as needed for Pain. INSULIN GLARGINE (LANTUS SOLOSTAR) 100 UNIT/ML INJECTION PEN    Inject 25 Units into the skin nightly    NYSTATIN (MYCOSTATIN) 763286 UNIT/GM CREAM    Apply topically 2 times daily.     POLYETHYLENE GLYCOL (GLYCOLAX) PACKET    Take 17 g by mouth daily    TORSEMIDE (DEMADEX) 20 MG TABLET    Take 2 tablets by mouth daily Take weight daily:  If weight < 255 pounds, do not take any torsemide that day  If weight 255-260 pounds, take 1 tab (20mg)  If weight 260-265 pounds, take 2 tabs (40mg)  If weight > 265 pounds, take 3 tabs (15GI)    TRULICITY 8.22 AL/9.0TN SOPN    ADM 0.5 ML SC Q WK    VITAMIN D (CHOLECALCIFEROL) 125 MCG (5000 UT) CAPS CAPSULE    Take 1 capsule by mouth daily       Allergies     She is allergic to dicumarol, percocet [oxycodone-acetaminophen], and warfarin and related. Physical Exam     INITIAL VITALS: BP: 105/69, Temp: 98.4 °F (36.9 °C), Heart Rate: 76, Resp: 16, SpO2: 98 %     General:  Well appearing. No acute distress. Non-toxic appearing    Eyes:  Pupils equally round, reactive, brisk. No discharge from eyes. ENT:  No discharge from nose. OP clear. Neck:  Supple. Nontender. Pulmonary:   Non-labored breathing. Breath sounds clear bilaterally. Cardiac:  Regular rate and rhythm. No murmurs. Abdomen:  Soft. Non-tender throughout. Non-distended. No masses. : Planned exam was not able to be performed due to patient elopement    Musculoskeletal:  No long bone deformity. No ankle or wrist deformity. Vascular:  Extremities warm and perfused. Skin:  No rash. Warm. Neuro: Alert and conversant CN II-XII grossly intact. Speech and mentation normal.    NERI  Sensation grossly intact to light touch. Gait narrow and stable, not ataxic. Extremities:  No peripheral edema. LE symmetric. Diagnostic Results     EKG   Not indicated    RADIOLOGY:  No orders to display       LABS:   No results found for this visit on 09/07/22. ED BEDSIDE ULTRASOUND:  None performed    Procedures     None performed    ED Course     Nursing Notes, Past Medical Hx, Past Surgical Hx, Social Hx, Allergies, and Family Hx were reviewed. The patient was given the following medications:  No orders of the defined types were placed in this encounter. CONSULTS:  None    MEDICAL DECISIONMAKING / ASSESSMENT / Rafy Candelario is a 57 y.o. female with BRBPR, skin irritation. Pt was hemodynamically stable and afebrile in the Emergency Department.     Patient presentation is most consistent with hemorrhoidal bleeding. In the differential would be considered lower or upper GI bleed, with lower being more likely. In the differential would be considered infection of the perineum completely given her risk factors of diabetes. Notably she had normal vital signs. I evaluated the patient and performed my initial exam.  I indicated to her that we needed to place her in a gown to facilitate a full skin exam.  I stepped of the room to allow the patient a gown and privacy. .  I also discussed with the patient that we would obtain laboratory studies to evaluate for systemic coagulopathy. I did indicate seriousness for complaints and that I would be performing further examination and laboratory studies. Unfortunately before I was able to return, the patient eloped from the emergency department. Laboratory studies were notable to be obtained. The patient did indicate to one of the nursing staff that she was going to leave, but I was not able to be contacted as I had been called to another room prior to the patient actually leaving. Given that she was notable to be counseled by another physician, she eloped prior to my ability to discharge [address] Clinical Impression     1. Eloped from emergency department    2. Bright red blood per rectum         Disposition     PATIENT REFERRED TO:  No follow-up provider specified.     DISCHARGE MEDICATIONS:  New Prescriptions    No medications on file       DISPOSITION Eloped - Left Before Treatment Complete 09/07/2022 09:19:53 PM         Otilia Shook MD  09/07/22 3089

## 2022-09-12 NOTE — TELEPHONE ENCOUNTER
Requested Prescriptions     Pending Prescriptions Disp Refills    amiodarone (CORDARONE) 200 MG tablet [Pharmacy Med Name: AMIODARONE 200MG TABLETS] 90 tablet 3     Sig: TAKE 1 TABLET BY MOUTH DAILY          Last Office Visit: 12/14/2021     Next Office Visit:12.01.2022

## 2022-09-15 RX ORDER — AMIODARONE HYDROCHLORIDE 200 MG/1
200 TABLET ORAL DAILY
Qty: 90 TABLET | Refills: 3 | Status: SHIPPED | OUTPATIENT
Start: 2022-09-15

## 2022-12-01 ENCOUNTER — OFFICE VISIT (OUTPATIENT)
Dept: CARDIOLOGY CLINIC | Age: 62
End: 2022-12-01
Payer: MEDICARE

## 2022-12-01 VITALS
WEIGHT: 250 LBS | DIASTOLIC BLOOD PRESSURE: 80 MMHG | BODY MASS INDEX: 41.6 KG/M2 | HEART RATE: 68 BPM | SYSTOLIC BLOOD PRESSURE: 110 MMHG

## 2022-12-01 DIAGNOSIS — I50.32 CHRONIC DIASTOLIC CONGESTIVE HEART FAILURE (HCC): Primary | ICD-10-CM

## 2022-12-01 DIAGNOSIS — I48.0 PAROXYSMAL ATRIAL FIBRILLATION (HCC): ICD-10-CM

## 2022-12-01 DIAGNOSIS — R94.31 ABNORMAL EKG: ICD-10-CM

## 2022-12-01 DIAGNOSIS — I10 ESSENTIAL HYPERTENSION: ICD-10-CM

## 2022-12-01 PROCEDURE — 3074F SYST BP LT 130 MM HG: CPT | Performed by: INTERNAL MEDICINE

## 2022-12-01 PROCEDURE — G8484 FLU IMMUNIZE NO ADMIN: HCPCS | Performed by: INTERNAL MEDICINE

## 2022-12-01 PROCEDURE — 99214 OFFICE O/P EST MOD 30 MIN: CPT | Performed by: INTERNAL MEDICINE

## 2022-12-01 PROCEDURE — 3078F DIAST BP <80 MM HG: CPT | Performed by: INTERNAL MEDICINE

## 2022-12-01 PROCEDURE — 1036F TOBACCO NON-USER: CPT | Performed by: INTERNAL MEDICINE

## 2022-12-01 PROCEDURE — G8428 CUR MEDS NOT DOCUMENT: HCPCS | Performed by: INTERNAL MEDICINE

## 2022-12-01 PROCEDURE — 3017F COLORECTAL CA SCREEN DOC REV: CPT | Performed by: INTERNAL MEDICINE

## 2022-12-01 PROCEDURE — G8417 CALC BMI ABV UP PARAM F/U: HCPCS | Performed by: INTERNAL MEDICINE

## 2022-12-01 NOTE — PROGRESS NOTES
Subjective:      Patient ID: Marisol Marlow is a 57 y.o. female. HPI Mrs. Maria Fernanda Dupree is here today for follow up  for afib/BP/abn ekg/chf. Feeling good other than hip issues. No exertional cp/sob. Rhythm stable. No tachycardia. No syncope. BP doing well at home. Still smoking. No pnd or orthopnea. No palp.      Past Medical History:   Diagnosis Date    CHF (congestive heart failure) ([facility])     Chronic kidney disease     Diabetes mellitus ([facility]     Hyperlipidemia     Hypertension     MDRO (multiple drug resistant organisms) resistance 6/3/2016    E Coli-urine     Past Surgical History:   Procedure Laterality Date    COLONOSCOPY      ENDOSCOPY, COLON, DIAGNOSTIC      TUBAL LIGATION           Allergies   Allergen Reactions    Dicumarol      Other    Percocet [Oxycodone-Acetaminophen] Itching    Warfarin And Related Hives     Other          Social History     Socioeconomic History    Marital status: Single     Spouse name: Not on file    Number of children: Not on file    Years of education: Not on file    Highest education level: Not on file   Occupational History    Not on file   Tobacco Use    Smoking status: Former     Types: Cigarettes     Quit date: 2016     Years since quittin.9    Smokeless tobacco: Never   Vaping Use    Vaping Use: Never used   Substance and Sexual Activity    Alcohol use: Yes     Comment: about 1 drink weekly    Drug use: No    Sexual activity: Not on file   Other Topics Concern    Not on file   Social History Narrative    Not on file     Social Determinants of Health     Financial Resource Strain: Not on file   Food Insecurity: Not on file   Transportation Needs: Not on file   Physical Activity: Not on file   Stress: Not on file   Social Connections: Not on file   Intimate Partner Violence: Not on file   Housing Stability: Not on file        FH reviewed, denies FH cardiac issues      Patient  has a past medical history of CHF (congestive heart failure) ([facility] Chronic kidney disease, Diabetes mellitus ([facility] [address] Hyperlipidemia, Hypertension, and MDRO (multiple drug resistant organisms) resistance. Current Outpatient Medications   Medication Sig Dispense Refill    amiodarone (CORDARONE) 200 MG tablet TAKE 1 TABLET BY MOUTH DAILY 90 tablet 3    TRULICITY 7.50 XR/8.1FT SOPN ADM 0.5 ML SC Q WK      amLODIPine (NORVASC) 5 MG tablet Take 1 tablet by mouth daily 90 tablet 1    insulin glargine (LANTUS SOLOSTAR) 100 UNIT/ML injection pen Inject 25 Units into the skin nightly 5 pen 3    torsemide (DEMADEX) 20 MG tablet Take 2 tablets by mouth daily Take weight daily:  If weight < 255 pounds, do not take any torsemide that day  If weight 255-260 pounds, take 1 tab (20mg)  If weight 260-265 pounds, take 2 tabs (40mg)  If weight > 265 pounds, take 3 tabs (60mg) 30 tablet 3    vitamin D (CHOLECALCIFEROL) 125 MCG (5000 UT) CAPS capsule Take 1 capsule by mouth daily 30 capsule 0    nystatin (MYCOSTATIN) 047090 UNIT/GM cream Apply topically 2 times daily. 1 Tube 1    albuterol sulfate  (90 Base) MCG/ACT inhaler Inhale 2 puffs into the lungs 4 times daily as needed for Wheezing 1 Inhaler 0    polyethylene glycol (GLYCOLAX) packet Take 17 g by mouth daily      carvedilol (COREG) 12.5 MG tablet Take 12.5 mg by mouth 2 times daily (with meals)      HYDROcodone-acetaminophen (NORCO) 7.5-325 MG per tablet Take 1 tablet by mouth every 4 hours as needed for Pain. atorvastatin (LIPITOR) 40 MG tablet Take 40 mg by mouth nightly       gabapentin (NEURONTIN) 300 MG capsule Take 1 capsule by mouth 3 times daily for 30 days. 90 capsule 3    ferrous gluconate 324 (37.5 Fe) MG TABS Take 1 tablet by mouth daily (with breakfast) (Patient taking differently: Take 324 mg by mouth 2 times daily ) 30 tablet 0    famotidine (PEPCID) 20 MG tablet Take 1 tablet by mouth daily 60 tablet 3     No current facility-administered medications for this visit.           Vitals:    12/01/22 1030   BP: 110/80   Pulse: 76       Wt 250    Review of Systems   Constitutional: Negative for activity change, appetite change and fatigue. Respiratory: Negative for cough, choking, chest tightness and shortness of breath. Cardiovascular: Negative for chest pain, palpitations and leg swelling. Denies PND or orthopnea. No tachycardia or syncope. Neurological: Negative for dizziness, syncope and headaches. Psychiatric/Behavioral: Negative for agitation, behavioral problems and confusion. All other systems reviewed negative as done. Objective:   Physical Exam   Constitutional: She is oriented to person, place, and time. She appears well-developed and well-nourished. No distress. HENT:   Head: Normocephalic and atraumatic. Eyes: Conjunctivae and EOM are normal. Right eye exhibits no discharge. Left eye exhibits no discharge. Neck: Normal range of motion. No JVD present. Cardiovascular: Normal rate, regular rhythm and normal heart sounds. Exam reveals no gallop. No murmur heard. Pulmonary/Chest: Effort normal and breath sounds normal. No respiratory distress. Some wheezing. She has no rales. Abdominal: Soft. Bowel sounds are normal. There is no tenderness. Musculoskeletal: Normal range of motion. She exhibits no edema. Neurological: She is alert and oriented to person, place, and time. Skin: Skin is warm and dry. Psychiatric: She has a normal mood and affect. Her behavior is normal. Thought content normal.       Assessment:       Diagnosis Orders   1. Chronic diastolic congestive heart failure (HCC)        2. Paroxysmal atrial fibrillation ([address]        3. Essential hypertension        4. Abnormal EKG                Plan:      CV stable. Income issues. No exertional sx. Compensated. BP good  Rhythm stable. Continue amio. No ac due to GI bleed. Continues smoking. Stop smoking. wt up. Will continue amio for afib. Continue torsemide/coreg for CHF.  Thyroid fxn/ PFTs-diffusion capacity since on amio since did not get from last visit. No changes. Reviewed previous records and testing including myoview 2/20. Continue to monitor. Follow up 6 months.

## 2022-12-15 NOTE — PROGRESS NOTES
RESPIRATORY THERAPY ASSESSMENT    Name:  82 Michelle Shahid Record Number:  [id number]  Age: 59 y.o. Gender: female  : 1960  Today's Date:  2019  Room:  [address] [place] [address]    Assessment     Is the patient being admitted for a COPD or Asthma exacerbation? No   (If yes the patient will be seen every 4 hours for the first 24 hours and then reassessed)    Patient Admission Diagnosis      Allergies  Allergies   Allergen Reactions    Dicumarol      Other    Warfarin And Related Hives     Other         Minimum Predicted Vital Capacity:    850          Actual Vital Capacity:      1L              Pulmonary History: quit smoking 2 yrs ago  Home Oxygen Therapy:none  Home Respiratory Therapy: none   Current Respiratory Therapy:  N QID albuterol/atrovent  Treatment Type: [facility]  Medications: Albuterol/Ipratropium    Respiratory Severity Index(RSI)   Patients with orders for inhalation medications, oxygen, or any therapeutic treatment modality will be placed on Respiratory Protocol. They will be assessed with the first treatment and at least every 72 hours thereafter. The following severity scale will be used to determine frequency of treatment intervention.     Smoking History: Pulmonary Disease or Smoking History, Greater than 15 pack year = 2    Social History  Social History     Tobacco Use    Smoking status: Former Smoker     Types: Cigarettes     Last attempt to quit: 2016     Years since quittin.2    Smokeless tobacco: Never Used   Substance Use Topics    Alcohol use: Yes     Comment: about 1 drink weekly    Drug use: No       Recent Surgical History: None = 0  Past Surgical History  Past Surgical History:   Procedure Laterality Date    COLONOSCOPY      ENDOSCOPY, COLON, DIAGNOSTIC      TUBAL LIGATION         Level of Consciousness: Alert, Oriented, and Cooperative = 0    Level of Activity: Walking unassisted = 0    Respiratory Pattern: Regular Pattern; RR 8-20 = 0    Breath Sounds: Absent and cooperative     b. HR < 140 bpm  c. RR < 30 bpm                d. Can demonstrate a 2-3 second inspiratory hold  4. Bronchodilators will be administered via Hand Held Nebulizer BRIEN [facility]) to patients when ANY of the following criteria are met  a. Incognizant or uncooperative          b. Patients treated with HHN at Home        c. Unable to demonstrate proper use of MDI with spacer     d. RR > 30 bpm   5. Bronchodilators will be delivered via Metered Dose Inhaler (MDI), HHN, Aerogen to intubated patients on mechanical ventilation. 6. Inhalation medication orders will be delivered and/or substituted as outlined below. Aerosolized Medications Ordering and Administration Guidelines:    1. All Medications will be ordered by a physician, and their frequency and/or modality will be adjusted as defined by the patients Respiratory Severity Index (RSI) score. 2. If the patient does not have documented COPD, consider discontinuing anticholinergics when RSI is less than 9.  3. If the bronchospasm worsens (increased RSI), then the bronchodilator frequency can be increased to a maximum of every 4 hours. If greater than every 4 hours is required, the physician will be contacted. 4. If the bronchospasm improves, the frequency of the bronchodilator can be decreased, based on the patient's RSI, but not less than home treatment regimen frequency. 5. Bronchodilator(s) will be discontinued if patient has a RSI less than 9 and has received no scheduled or as needed treatment for 72  Hrs. Patients Ordered on a Mucolytic Agent:    1. Must always be administered with a bronchodilator. 2. Discontinue if patient experiences worsened bronchospasm, or secretions have lessened to the point that the patient is able to clear them with a cough. Anti-inflammatory and Combination Medications:    1.  If the patient lacks prior history of lung disease, is not using inhaled anti-inflammatory medication at home, and lacks wheezing by gait, locomotion, and balance/muscle strength

## 2023-01-27 ENCOUNTER — HOSPITAL ENCOUNTER (EMERGENCY)
Age: 63
Discharge: HOME | End: 2023-01-27
Attending: EMERGENCY MEDICINE
Payer: MEDICARE

## 2023-01-27 ENCOUNTER — APPOINTMENT (OUTPATIENT)
Dept: CT IMAGING | Age: 63
End: 2023-01-27
Payer: MEDICARE

## 2023-01-27 VITALS
OXYGEN SATURATION: 98 % | WEIGHT: 221.6 LBS | DIASTOLIC BLOOD PRESSURE: 92 MMHG | RESPIRATION RATE: 18 BRPM | HEIGHT: 65 IN | BODY MASS INDEX: 36.92 KG/M2 | TEMPERATURE: 98.8 F | SYSTOLIC BLOOD PRESSURE: 157 MMHG | HEART RATE: 74 BPM

## 2023-01-27 DIAGNOSIS — K62.5 RECTAL BLEEDING: Primary | ICD-10-CM

## 2023-01-27 LAB
A/G RATIO: 1.3 (ref 1.1–2.2)
ALBUMIN SERPL-MCNC: 4.3 G/DL (ref 3.4–5)
ALP BLD-CCNC: 88 U/L (ref 40–129)
ALT SERPL-CCNC: 10 U/L (ref 10–40)
ANION GAP SERPL CALCULATED.3IONS-SCNC: 13 MMOL/L (ref 3–16)
AST SERPL-CCNC: 11 U/L (ref 15–37)
BACTERIA: ABNORMAL /HPF
BASOPHILS ABSOLUTE: 0.1 K/UL (ref 0–0.2)
BASOPHILS RELATIVE PERCENT: 0.7 %
BILIRUB SERPL-MCNC: <0.2 MG/DL (ref 0–1)
BILIRUBIN URINE: NEGATIVE
BLOOD, URINE: ABNORMAL
BUN BLDV-MCNC: 17 MG/DL (ref 7–20)
CALCIUM SERPL-MCNC: 9.5 MG/DL (ref 8.3–10.6)
CHLORIDE BLD-SCNC: 104 MMOL/L (ref 99–110)
CLARITY: CLEAR
CO2: 24 MMOL/L (ref 21–32)
COLOR: YELLOW
CREAT SERPL-MCNC: 1.9 MG/DL (ref 0.6–1.2)
EOSINOPHILS ABSOLUTE: 0.2 K/UL (ref 0–0.6)
EOSINOPHILS RELATIVE PERCENT: 2 %
EPITHELIAL CELLS, UA: ABNORMAL /HPF (ref 0–5)
GFR SERPL CREATININE-BSD FRML MDRD: 29 ML/MIN/{1.73_M2}
GLUCOSE BLD-MCNC: 142 MG/DL (ref 70–99)
GLUCOSE URINE: NEGATIVE MG/DL
HCT VFR BLD CALC: 30.7 % (ref 36–48)
HEMOGLOBIN: 10 G/DL (ref 12–16)
INR BLD: 1.08 (ref 0.87–1.14)
KETONES, URINE: NEGATIVE MG/DL
LEUKOCYTE ESTERASE, URINE: NEGATIVE
LIPASE: 14 U/L (ref 13–60)
LYMPHOCYTES ABSOLUTE: 2 K/UL (ref 1–5.1)
LYMPHOCYTES RELATIVE PERCENT: 17.7 %
MCH RBC QN AUTO: 28.7 PG (ref 26–34)
MCHC RBC AUTO-ENTMCNC: 32.5 G/DL (ref 31–36)
MCV RBC AUTO: 88.2 FL (ref 80–100)
MICROSCOPIC EXAMINATION: YES
MONOCYTES ABSOLUTE: 0.8 K/UL (ref 0–1.3)
MONOCYTES RELATIVE PERCENT: 6.9 %
NEUTROPHILS ABSOLUTE: 8.1 K/UL (ref 1.7–7.7)
NEUTROPHILS RELATIVE PERCENT: 72.7 %
NITRITE, URINE: NEGATIVE
PDW BLD-RTO: 18.6 % (ref 12.4–15.4)
PH UA: 6 (ref 5–8)
PLATELET # BLD: 279 K/UL (ref 135–450)
PMV BLD AUTO: 7.5 FL (ref 5–10.5)
POTASSIUM REFLEX MAGNESIUM: 4.1 MMOL/L (ref 3.5–5.1)
PROTEIN UA: 30 MG/DL
PROTHROMBIN TIME: 13.9 SEC (ref 11.7–14.5)
RBC # BLD: 3.48 M/UL (ref 4–5.2)
RBC UA: ABNORMAL /HPF (ref 0–4)
SODIUM BLD-SCNC: 141 MMOL/L (ref 136–145)
SPECIFIC GRAVITY UA: 1.02 (ref 1–1.03)
TOTAL PROTEIN: 7.6 G/DL (ref 6.4–8.2)
URINE REFLEX TO CULTURE: ABNORMAL
URINE TYPE: ABNORMAL
UROBILINOGEN, URINE: 0.2 E.U./DL
WBC # BLD: 11.2 K/UL (ref 4–11)
WBC UA: ABNORMAL /HPF (ref 0–5)

## 2023-01-27 PROCEDURE — 36415 COLL VENOUS BLD VENIPUNCTURE: CPT

## 2023-01-27 PROCEDURE — 74176 CT ABD & PELVIS W/O CONTRAST: CPT

## 2023-01-27 PROCEDURE — 6370000000 HC RX 637 (ALT 250 FOR IP): Performed by: STUDENT IN AN ORGANIZED HEALTH CARE EDUCATION/TRAINING PROGRAM

## 2023-01-27 PROCEDURE — 85610 PROTHROMBIN TIME: CPT

## 2023-01-27 PROCEDURE — 80053 COMPREHEN METABOLIC PANEL: CPT

## 2023-01-27 PROCEDURE — 85025 COMPLETE CBC W/AUTO DIFF WBC: CPT

## 2023-01-27 PROCEDURE — 99284 EMERGENCY DEPT VISIT MOD MDM: CPT

## 2023-01-27 PROCEDURE — 83690 ASSAY OF LIPASE: CPT

## 2023-01-27 PROCEDURE — 81001 URINALYSIS AUTO W/SCOPE: CPT

## 2023-01-27 RX ORDER — ACETAMINOPHEN 500 MG
1000 TABLET ORAL ONCE
Status: DISCONTINUED | OUTPATIENT
Start: 2023-01-27 | End: 2023-01-27

## 2023-01-27 RX ORDER — HYDROCODONE BITARTRATE AND ACETAMINOPHEN 5; 325 MG/1; MG/1
1 TABLET ORAL ONCE
Status: COMPLETED | OUTPATIENT
Start: 2023-01-27 | End: 2023-01-27

## 2023-01-27 RX ADMIN — HYDROCODONE BITARTRATE AND ACETAMINOPHEN 1 TABLET: 5; 325 TABLET ORAL at 11:12

## 2023-01-27 ASSESSMENT — ENCOUNTER SYMPTOMS
VOMITING: 0
NAUSEA: 0
BLOOD IN STOOL: 1
ANAL BLEEDING: 1
ABDOMINAL PAIN: 1
CONSTIPATION: 1
SHORTNESS OF BREATH: 0
DIARRHEA: 0

## 2023-01-27 ASSESSMENT — PAIN - FUNCTIONAL ASSESSMENT: PAIN_FUNCTIONAL_ASSESSMENT: 0-10

## 2023-01-27 ASSESSMENT — PAIN SCALES - GENERAL
PAINLEVEL_OUTOF10: 3
PAINLEVEL_OUTOF10: 8

## 2023-01-27 ASSESSMENT — PAIN DESCRIPTION - LOCATION
LOCATION: ABDOMEN
LOCATION: ABDOMEN

## 2023-01-27 ASSESSMENT — PAIN DESCRIPTION - PAIN TYPE: TYPE: ACUTE PAIN

## 2023-01-27 ASSESSMENT — PAIN DESCRIPTION - DESCRIPTORS
DESCRIPTORS: CRAMPING
DESCRIPTORS: ACHING

## 2023-01-27 ASSESSMENT — PAIN DESCRIPTION - ORIENTATION: ORIENTATION: RIGHT;LEFT;LOWER

## 2023-01-27 ASSESSMENT — PAIN DESCRIPTION - FREQUENCY: FREQUENCY: INTERMITTENT

## 2023-01-27 NOTE — ED PROVIDER NOTES
ED Attending Attestation Note     Date of evaluation: 1/27/2023    This patient was seen by the resident. I have seen and examined the patient, agree with the workup, evaluation, management and diagnosis. The care plan has been discussed. I was present for any procedures performed in the resident's  note and have made edits to the note where appropriate. My assessment reveals 57 y.o. female with history of chronic constipation presenting for lower abdominal cramping and bright red blood per rectum after large bowel movement yesterday. Here she has reassuring vital signs, is in no distress, respirations even and unlabored, no acute abdomen.         Jose Caraballo MD  01/27/23 1488

## 2023-01-27 NOTE — ED NOTES
Patient prepared for and ready to be discharged. Patient discharged at this time in no acute distress after verbalizing understanding of discharge instructions. Patient left after receiving After Visit Summary instructions.       Edgar Munson RN  01/27/23 7879

## 2023-01-27 NOTE — DISCHARGE INSTRUCTIONS
You were seen and evaluated in the ER for rectal bleeding. You did not have any blood on your rectal exam.  Your hemoglobin level was stable. Your remaining lab work was reassuring. You had a CT of your abdomen which did not show any acute abnormalities. Your CT did show a small pulmonary nodule. You should follow-up with your primary care doctor for this. You may need to have further scan in the future. If you have worsening bleeding, lightheadedness, loss of consciousness, chest pain, shortness of breath, fever, or any other new or concerning symptoms you should return to the ER.

## 2023-01-27 NOTE — ED PROVIDER NOTES
[address] [facility] RESIDENT NOTE       Date of evaluation: 1/27/2023    Chief Complaint     Rectal Bleeding (Pt states yesterday she had difficulty with bowel movement. Since this started noticing bright red blood in toilet. Also c/o cramping in lower abdomen. )      History of Present Illness     Mak Lemus is a 57 y.o. female with history of DM2, CHF, A. fib, colitis, CKD who presents bright red blood per rectum. Patient states yesterday she had 4 bowel movements. The last time she had a bowel movement, she had bright red blood in the stool and on wiping. She states overnight she had 5 occasions of just passing blood through her rectum. She states with each episode she had crampy abdominal pain and felt like she was needing to have a bowel movement. She states after these episodes she only had blood in the toilet. No stool. Last episode was earlier this morning. She has never had similar symptoms in the past.  She states her abdomen hurts in the left lower quadrant and suprapubic area. She states it is a cramping pain. Is typically associated with rectal bleeding. No fevers. No nausea or vomiting. She is not anticoagulated. No history of bleeding disorders. Only abdominal surgeries are bilateral tubal ligation. No chest pain or shortness of breath. She does take iron for iron deficiency anemia. Denies vaginal bleeding. No urinary symptoms. MEDICAL DECISION MAKING / ASSESSMENT / PLAN     INITIAL VITALS: BP: (!) 157/92, Temp: 98.8 °F (37.1 °C), Heart Rate: 74, Resp: 18, SpO2: 98 %    Mak Lemus is a 57 y.o. female with history as above presenting with bright red blood per rectum. Reports multiple episodes of blood from her rectum without having an actual bowel movement overnight. Reports crampy abdominal pain which is new for her most notable in the left lower quadrant and suprapubic area. She is also tender in this area.   She has stable vital signs in the ED. She is well-appearing. Will evaluate for possible diverticulitis, colitis, significant anemia, coagulopathy. Will give Tylenol for pain. .      Medical Decision Making      This patient was also evaluated by the attending physician. All care plans werediscussed and agreed upon. Clinical Impression     No diagnosis found. Disposition     PATIENT REFERRED TO:  No follow-up provider specified. DISCHARGE MEDICATIONS:  New Prescriptions    No medications on file       DISPOSITION          Diagnostic Results and Other Data     RADIOLOGY:  CT ABDOMEN PELVIS W IV CONTRAST Additional Contrast? None    (Results Pending)       LABS:   No results found for this visit on 01/27/23. EKG   Interpreted in conjunction with emergencydepartment physician No att. providers found  Rhythm: {CARDIACMONITOR STRIP RHYTHM:20107}  Rate: {EKG RATE:20108}  Axis: {EKG AXIS:20114}  Ectopy: {EKGECTOPY:20109}  Conduction: {EKG CONDUCTION:20110}  ST Segments: {EKG ST SEGMENTS:20112}  T Waves:{EKG T WAVES:20113}  Q Waves: {EKG LEADS 2:20111}  Clinical Impression: {EKG CLINICAL IMPRESSION:20115}  Comparison:  ***    ED BEDSIDE ULTRASOUND:  No results found. RECENT VITALS:  BP: (!) 157/92, Temp: 98.8 °F (37.1 °C), Heart Rate: 74,Resp: 18, SpO2: 98 %     Procedures     ***    ED Course     Nursing Notes, Past Medical Hx, Past Surgical Hx, Social Hx, Allergies, and Family Hx were reviewed. The patient was given the following medications:  Orders Placed This Encounter   Medications    acetaminophen (TYLENOL) tablet 1,000 mg       CONSULTS:  None    Review of Systems     Review of Systems   Constitutional:  Negative for appetite change, fatigue and fever. Respiratory:  Negative for shortness of breath. Cardiovascular:  Negative for chest pain and leg swelling. Gastrointestinal:  Positive for abdominal pain, anal bleeding, blood in stool and constipation. Negative for diarrhea, nausea and vomiting. Genitourinary:  Negative for dysuria, hematuria, vaginal bleeding and vaginal discharge. Skin:  Negative for pallor. Neurological:  Negative for dizziness and light-headedness. All other systems reviewed and are negative. Past Medical, Surgical, Family, and Social History     She has a past medical history of CHF (congestive heart failure) ([facility] [address] [address] Chronic kidney disease, Diabetes mellitus ([facility] [address] Hyperlipidemia, Hypertension, and MDRO (multiple drug resistant organisms) resistance. She has a past surgical history that includes Colonoscopy; Endoscopy, colon, diagnostic; and Tubal ligation. Her family history includes Diabetes in her mother; Hypertension in her mother; No Known Problems in her brother, father, maternal grandfather, maternal grandmother, paternal grandfather, paternal grandmother, sister, and another family member. She reports that she quit smoking about 6 years ago. Her smoking use included cigarettes. She has never used smokeless tobacco. She reports current alcohol use. She reports that she does not use drugs. Medications     Previous Medications    ALBUTEROL SULFATE  (90 BASE) MCG/ACT INHALER    Inhale 2 puffs into the lungs 4 times daily as needed for Wheezing    AMIODARONE (CORDARONE) 200 MG TABLET    TAKE 1 TABLET BY MOUTH DAILY    AMLODIPINE (NORVASC) 5 MG TABLET    Take 1 tablet by mouth daily    ATORVASTATIN (LIPITOR) 40 MG TABLET    Take 40 mg by mouth nightly     CARVEDILOL (COREG) 12.5 MG TABLET    Take 12.5 mg by mouth 2 times daily (with meals)    FAMOTIDINE (PEPCID) 20 MG TABLET    Take 1 tablet by mouth daily    FERROUS GLUCONATE 324 (37.5 FE) MG TABS    Take 1 tablet by mouth daily (with breakfast)    GABAPENTIN (NEURONTIN) 300 MG CAPSULE    Take 1 capsule by mouth 3 times daily for 30 days. HYDROCODONE-ACETAMINOPHEN (NORCO) 7.5-325 MG PER TABLET    Take 1 tablet by mouth every 4 hours as needed for Pain.      INSULIN GLARGINE (LANTUS SOLOSTAR) 100 UNIT/ML INJECTION PEN    Inject 25 Units into the skin nightly    NYSTATIN (MYCOSTATIN) 710339 UNIT/GM CREAM    Apply topically 2 times daily. POLYETHYLENE GLYCOL (GLYCOLAX) PACKET    Take 17 g by mouth daily    TORSEMIDE (DEMADEX) 20 MG TABLET    Take 2 tablets by mouth daily Take weight daily:  If weight < 255 pounds, do not take any torsemide that day  If weight 255-260 pounds, take 1 tab (20mg)  If weight 260-265 pounds, take 2 tabs (40mg)  If weight > 265 pounds, take 3 tabs (00XH)    TRULICITY 5.25 YT/2.5NR SOPN    ADM 0.5 ML SC Q WK    VITAMIN D (CHOLECALCIFEROL) 125 MCG (5000 UT) CAPS CAPSULE    Take 1 capsule by mouth daily       Allergies     She is allergic to dicumarol, percocet [oxycodone-acetaminophen], and warfarin and related. Physical Exam     INITIAL VITALS: BP: (!) 157/92, Temp: 98.8 °F (37.1 °C), Heart Rate: 74, Resp: 18, SpO2: 98 %   Physical Exam  Vitals and nursing note reviewed. Exam conducted with a chaperone present. Constitutional:       General: She is not in acute distress. Appearance: Normal appearance. She is not ill-appearing or toxic-appearing. HENT:      Head: Normocephalic and atraumatic. Right Ear: External ear normal.      Left Ear: External ear normal.      Nose: Nose normal. No congestion or rhinorrhea. Mouth/Throat:      Mouth: Mucous membranes are moist.      Pharynx: Oropharynx is clear. No oropharyngeal exudate or posterior oropharyngeal erythema. Eyes:      General: No scleral icterus. Right eye: No discharge. Left eye: No discharge. Extraocular Movements: Extraocular movements intact. Conjunctiva/sclera: Conjunctivae normal.      Pupils: Pupils are equal, round, and reactive to light. Cardiovascular:      Rate and Rhythm: Normal rate and regular rhythm. Pulses: Normal pulses. Heart sounds: Normal heart sounds. No murmur heard. No friction rub. No gallop.    Pulmonary:      Effort: Pulmonary effort is normal. No respiratory distress. Breath sounds: Normal breath sounds. No wheezing, rhonchi or rales. Abdominal:      General: Abdomen is flat. Palpations: Abdomen is soft. Tenderness: There is abdominal tenderness (LLQ, suprapubic area). There is no guarding or rebound. Genitourinary:     Rectum: Normal.      Comments: No obvious external hemorrhoids, no obvious internal hemorrhoids palpated, normal rectal tone, light brown stool in rectal vault  Musculoskeletal:         General: Normal range of motion. Cervical back: Normal range of motion and neck supple. Right lower leg: No edema. Left lower leg: No edema. Skin:     General: Skin is warm and dry. Neurological:      General: No focal deficit present. Mental Status: She is alert and oriented to person, place, and time.    Psychiatric:         Mood and Affect: Mood normal.         Behavior: Behavior normal. R-3Print      vitamin D (CHOLECALCIFEROL) 125 MCG (5000 UT) CAPS capsule Take 1 capsule by mouth daily, Disp-30 capsule, R-0Normal      nystatin (MYCOSTATIN) 121120 UNIT/GM cream Apply topically 2 times daily. , Disp-1 Tube, R-1, Normal      albuterol sulfate  (90 Base) MCG/ACT inhaler Inhale 2 puffs into the lungs 4 times daily as needed for Wheezing, Disp-1 Inhaler, R-0Normal      polyethylene glycol (GLYCOLAX) packet Take 17 g by mouth dailyHistorical Med      carvedilol (COREG) 12.5 MG tablet Take 12.5 mg by mouth 2 times daily (with meals)Historical Med      HYDROcodone-acetaminophen (NORCO) 7.5-325 MG per tablet Take 1 tablet by mouth every 4 hours as needed for Pain. Historical Med      atorvastatin (LIPITOR) 40 MG tablet Take 40 mg by mouth nightly Historical Med             Allergies     She is allergic to dicumarol, percocet [oxycodone-acetaminophen], and warfarin and related. Physical Exam     INITIAL VITALS: BP: (!) 157/92, Temp: 98.8 °F (37.1 °C), Heart Rate: 74, Resp: 18, SpO2: 98 %   Physical Exam  Vitals and nursing note reviewed. Exam conducted with a chaperone present. Constitutional:       General: She is not in acute distress. Appearance: Normal appearance. She is not ill-appearing or toxic-appearing. HENT:      Head: Normocephalic and atraumatic. Right Ear: External ear normal.      Left Ear: External ear normal.      Nose: Nose normal. No congestion or rhinorrhea. Mouth/Throat:      Mouth: Mucous membranes are moist.      Pharynx: Oropharynx is clear. No oropharyngeal exudate or posterior oropharyngeal erythema. Eyes:      General: No scleral icterus. Right eye: No discharge. Left eye: No discharge. Extraocular Movements: Extraocular movements intact. Conjunctiva/sclera: Conjunctivae normal.      Pupils: Pupils are equal, round, and reactive to light. Cardiovascular:      Rate and Rhythm: Normal rate and regular rhythm.       Pulses: Normal pulses. Heart sounds: Normal heart sounds. No murmur heard. No friction rub. No gallop. Pulmonary:      Effort: Pulmonary effort is normal. No respiratory distress. Breath sounds: Normal breath sounds. No wheezing, rhonchi or rales. Abdominal:      General: Abdomen is flat. Palpations: Abdomen is soft. Tenderness: There is abdominal tenderness (LLQ, suprapubic area). There is no guarding or rebound. Genitourinary:     Rectum: Normal.      Comments: No obvious external hemorrhoids, no obvious internal hemorrhoids palpated, normal rectal tone, light brown stool in rectal vault  Musculoskeletal:         General: Normal range of motion. Cervical back: Normal range of motion and neck supple. Right lower leg: No edema. Left lower leg: No edema. Skin:     General: Skin is warm and dry. Neurological:      General: No focal deficit present. Mental Status: She is alert and oriented to person, place, and time.    Psychiatric:         Mood and Affect: Mood normal.         Behavior: Behavior normal.              Maria Fernanda Santizo MD  Resident  02/01/23 1210

## 2023-06-01 DIAGNOSIS — N18.4 CKD (CHRONIC KIDNEY DISEASE) STAGE 4, GFR 15-29 ML/MIN (HCC): ICD-10-CM

## 2023-06-01 DIAGNOSIS — K62.5 GASTROINTESTINAL HEMORRHAGE ASSOCIATED WITH ANORECTAL SOURCE: ICD-10-CM

## 2023-06-01 DIAGNOSIS — R60.9 EDEMA, UNSPECIFIED TYPE: ICD-10-CM

## 2023-06-01 DIAGNOSIS — E87.5 HYPERKALEMIA: ICD-10-CM

## 2023-06-01 DIAGNOSIS — I10 ESSENTIAL HYPERTENSION: ICD-10-CM

## 2023-06-01 DIAGNOSIS — E11.69 TYPE 2 DIABETES MELLITUS WITH OTHER SPECIFIED COMPLICATION, UNSPECIFIED WHETHER LONG TERM INSULIN USE (HCC): ICD-10-CM

## 2023-06-01 DIAGNOSIS — I48.0 PAROXYSMAL ATRIAL FIBRILLATION (HCC): ICD-10-CM

## 2023-06-01 DIAGNOSIS — I50.32 CHRONIC DIASTOLIC CONGESTIVE HEART FAILURE (HCC): ICD-10-CM

## 2023-06-01 DIAGNOSIS — R94.31 ABNORMAL EKG: ICD-10-CM

## 2023-06-01 DIAGNOSIS — I10 HYPERTENSION, UNSPECIFIED TYPE: ICD-10-CM

## 2023-06-02 LAB
ALBUMIN SERPL-MCNC: 4.4 G/DL (ref 3.4–5)
ANION GAP SERPL CALCULATED.3IONS-SCNC: 13 MMOL/L (ref 3–16)
BASOPHILS # BLD: 0 K/UL (ref 0–0.2)
BASOPHILS NFR BLD: 0.4 %
BUN SERPL-MCNC: 25 MG/DL (ref 7–20)
CALCIUM SERPL-MCNC: 9.4 MG/DL (ref 8.3–10.6)
CHLORIDE SERPL-SCNC: 106 MMOL/L (ref 99–110)
CO2 SERPL-SCNC: 22 MMOL/L (ref 21–32)
CREAT SERPL-MCNC: 2.2 MG/DL (ref 0.6–1.2)
CREAT UR-MCNC: 165.4 MG/DL (ref 28–259)
DEPRECATED RDW RBC AUTO: 19.7 % (ref 12.4–15.4)
EOSINOPHIL # BLD: 0.1 K/UL (ref 0–0.6)
EOSINOPHIL NFR BLD: 0.8 %
GFR SERPLBLD CREATININE-BSD FMLA CKD-EPI: 24 ML/MIN/{1.73_M2}
GLUCOSE SERPL-MCNC: 153 MG/DL (ref 70–99)
HCT VFR BLD AUTO: 28.9 % (ref 36–48)
HGB BLD-MCNC: 9.3 G/DL (ref 12–16)
LYMPHOCYTES # BLD: 1.7 K/UL (ref 1–5.1)
LYMPHOCYTES NFR BLD: 19.8 %
MCH RBC QN AUTO: 29.6 PG (ref 26–34)
MCHC RBC AUTO-ENTMCNC: 32.3 G/DL (ref 31–36)
MCV RBC AUTO: 91.7 FL (ref 80–100)
MICROALBUMIN UR DL<=1MG/L-MCNC: 35.6 MG/DL
MICROALBUMIN/CREAT UR: 215.2 MG/G (ref 0–30)
MONOCYTES # BLD: 0.5 K/UL (ref 0–1.3)
MONOCYTES NFR BLD: 5.2 %
NEUTROPHILS # BLD: 6.4 K/UL (ref 1.7–7.7)
NEUTROPHILS NFR BLD: 73.8 %
PHOSPHATE SERPL-MCNC: 3.9 MG/DL (ref 2.5–4.9)
PLATELET # BLD AUTO: 264 K/UL (ref 135–450)
PMV BLD AUTO: 8 FL (ref 5–10.5)
POTASSIUM SERPL-SCNC: 4.7 MMOL/L (ref 3.5–5.1)
RBC # BLD AUTO: 3.15 M/UL (ref 4–5.2)
SODIUM SERPL-SCNC: 141 MMOL/L (ref 136–145)
TSH SERPL DL<=0.005 MIU/L-ACNC: 0.32 UIU/ML (ref 0.27–4.2)
WBC # BLD AUTO: 8.7 K/UL (ref 4–11)

## 2023-06-06 ENCOUNTER — OFFICE VISIT (OUTPATIENT)
Dept: CARDIOLOGY CLINIC | Age: 63
End: 2023-06-06
Payer: MEDICARE

## 2023-06-06 VITALS
SYSTOLIC BLOOD PRESSURE: 126 MMHG | DIASTOLIC BLOOD PRESSURE: 70 MMHG | HEART RATE: 68 BPM | WEIGHT: 244.4 LBS | BODY MASS INDEX: 40.67 KG/M2

## 2023-06-06 DIAGNOSIS — I50.32 CHRONIC DIASTOLIC CONGESTIVE HEART FAILURE (HCC): Primary | ICD-10-CM

## 2023-06-06 DIAGNOSIS — R94.31 ABNORMAL EKG: ICD-10-CM

## 2023-06-06 DIAGNOSIS — I10 ESSENTIAL HYPERTENSION: ICD-10-CM

## 2023-06-06 DIAGNOSIS — I48.0 PAROXYSMAL ATRIAL FIBRILLATION (HCC): ICD-10-CM

## 2023-06-06 PROCEDURE — G8427 DOCREV CUR MEDS BY ELIG CLIN: HCPCS | Performed by: INTERNAL MEDICINE

## 2023-06-06 PROCEDURE — 3078F DIAST BP <80 MM HG: CPT | Performed by: INTERNAL MEDICINE

## 2023-06-06 PROCEDURE — 1036F TOBACCO NON-USER: CPT | Performed by: INTERNAL MEDICINE

## 2023-06-06 PROCEDURE — 1090F PRES/ABSN URINE INCON ASSESS: CPT | Performed by: INTERNAL MEDICINE

## 2023-06-06 PROCEDURE — G8400 PT W/DXA NO RESULTS DOC: HCPCS | Performed by: INTERNAL MEDICINE

## 2023-06-06 PROCEDURE — 99214 OFFICE O/P EST MOD 30 MIN: CPT | Performed by: INTERNAL MEDICINE

## 2023-06-06 PROCEDURE — 3074F SYST BP LT 130 MM HG: CPT | Performed by: INTERNAL MEDICINE

## 2023-06-06 PROCEDURE — G8417 CALC BMI ABV UP PARAM F/U: HCPCS | Performed by: INTERNAL MEDICINE

## 2023-06-06 PROCEDURE — 1123F ACP DISCUSS/DSCN MKR DOCD: CPT | Performed by: INTERNAL MEDICINE

## 2023-06-06 PROCEDURE — 3017F COLORECTAL CA SCREEN DOC REV: CPT | Performed by: INTERNAL MEDICINE

## 2023-06-06 NOTE — PROGRESS NOTES
Pulse: 68           Wt 244    Review of Systems   Constitutional: Negative for activity change, appetite change and fatigue. Respiratory: Negative for cough, choking, chest tightness and shortness of breath. Cardiovascular: Negative for chest pain, palpitations and leg swelling. Denies PND or orthopnea. No tachycardia or syncope. Neurological: Negative for dizziness, syncope and headaches. Psychiatric/Behavioral: Negative for agitation, behavioral problems and confusion. All other systems reviewed negative as done. Objective:   Physical Exam   Constitutional: She is oriented to person, place, and time. She appears well-developed and well-nourished. No distress. HENT:   Head: Normocephalic and atraumatic. Eyes: Conjunctivae and EOM are normal. Right eye exhibits no discharge. Left eye exhibits no discharge. Neck: Normal range of motion. No JVD present. Cardiovascular: Normal rate, regular rhythm and normal heart sounds. Exam reveals no gallop. No murmur heard. Pulmonary/Chest: Effort normal and breath sounds normal. No respiratory distress. Some wheezing. She has no rales. Abdominal: Soft. Bowel sounds are normal. There is no tenderness. Musculoskeletal: Normal range of motion. She exhibits no edema. Neurological: She is alert and oriented to person, place, and time. Skin: Skin is warm and dry. Psychiatric: She has a normal mood and affect. Her behavior is normal. Thought content normal.       Assessment:       Diagnosis Orders   1. Chronic diastolic congestive heart failure (HCC)        2. Paroxysmal atrial fibrillation ([address]        3. Essential hypertension        4. Abnormal EKG                Plan:      CV stable. No exertional sx. Compensated. BP good  Rhythm stable. No ac due to GI bleed. Continues smoking. Stop smoking. wt up. Will continue amio for afib. Continue torsemide/coreg for CHF.  Thyroid fxn/ PFTs-diffusion capacity since on amio since did not

## 2023-10-11 NOTE — TELEPHONE ENCOUNTER
Requested Prescriptions     Pending Prescriptions Disp Refills    amiodarone (CORDARONE) 200 MG tablet [Pharmacy Med Name: AMIODARONE 200MG TABLETS] 90 tablet 3     Sig: TAKE 1 TABLET BY MOUTH DAILY              Last Office Visit: 6/6/2023     Next Office Visit: 12/7/2023

## 2023-10-12 RX ORDER — AMIODARONE HYDROCHLORIDE 200 MG/1
200 TABLET ORAL DAILY
Qty: 90 TABLET | Refills: 3 | Status: SHIPPED | OUTPATIENT
Start: 2023-10-12

## 2023-12-07 ENCOUNTER — OFFICE VISIT (OUTPATIENT)
Dept: CARDIOLOGY CLINIC | Age: 63
End: 2023-12-07

## 2023-12-07 VITALS
HEART RATE: 53 BPM | BODY MASS INDEX: 40.94 KG/M2 | SYSTOLIC BLOOD PRESSURE: 126 MMHG | WEIGHT: 246 LBS | DIASTOLIC BLOOD PRESSURE: 80 MMHG

## 2023-12-07 DIAGNOSIS — R94.31 ABNORMAL EKG: ICD-10-CM

## 2023-12-07 DIAGNOSIS — I48.0 PAROXYSMAL ATRIAL FIBRILLATION (HCC): ICD-10-CM

## 2023-12-07 DIAGNOSIS — R07.9 CHEST PAIN, UNSPECIFIED TYPE: Primary | ICD-10-CM

## 2023-12-07 DIAGNOSIS — I50.32 CHRONIC DIASTOLIC CONGESTIVE HEART FAILURE (HCC): ICD-10-CM

## 2023-12-07 DIAGNOSIS — I10 ESSENTIAL HYPERTENSION: ICD-10-CM

## 2023-12-07 RX ORDER — ISOSORBIDE MONONITRATE 30 MG/1
30 TABLET, EXTENDED RELEASE ORAL DAILY
Qty: 30 TABLET | Refills: 5 | Status: SHIPPED | OUTPATIENT
Start: 2023-12-07

## 2023-12-07 RX ORDER — ISOSORBIDE MONONITRATE 30 MG/1
30 TABLET, EXTENDED RELEASE ORAL DAILY
Qty: 90 TABLET | OUTPATIENT
Start: 2023-12-07

## 2023-12-07 NOTE — PROGRESS NOTES
(congestive heart failure) ([address] Chronic kidney disease, Diabetes mellitus ([address] ), Hyperlipidemia, Hypertension, and MDRO (multiple drug resistant organisms) resistance. Current Outpatient Medications   Medication Sig Dispense Refill    amiodarone (CORDARONE) 200 MG tablet TAKE 1 TABLET BY MOUTH DAILY 90 tablet 3    TRULICITY 1.74 ED/0.9TP SOPN ADM 0.5 ML SC Q WK      amLODIPine (NORVASC) 5 MG tablet Take 1 tablet by mouth daily 90 tablet 1    gabapentin (NEURONTIN) 300 MG capsule Take 1 capsule by mouth 3 times daily for 30 days. 90 capsule 3    insulin glargine (LANTUS SOLOSTAR) 100 UNIT/ML injection pen Inject 25 Units into the skin nightly 5 pen 3    ferrous gluconate 324 (37.5 Fe) MG TABS Take 1 tablet by mouth daily (with breakfast) (Patient taking differently: Take 324 mg by mouth 2 times daily ) 30 tablet 0    famotidine (PEPCID) 20 MG tablet Take 1 tablet by mouth daily 60 tablet 3    torsemide (DEMADEX) 20 MG tablet Take 2 tablets by mouth daily Take weight daily:  If weight < 255 pounds, do not take any torsemide that day  If weight 255-260 pounds, take 1 tab (20mg)  If weight 260-265 pounds, take 2 tabs (40mg)  If weight > 265 pounds, take 3 tabs (60mg) 30 tablet 3    vitamin D (CHOLECALCIFEROL) 125 MCG (5000 UT) CAPS capsule Take 1 capsule by mouth daily 30 capsule 0    nystatin (MYCOSTATIN) 729447 UNIT/GM cream Apply topically 2 times daily. 1 Tube 1    albuterol sulfate  (90 Base) MCG/ACT inhaler Inhale 2 puffs into the lungs 4 times daily as needed for Wheezing 1 Inhaler 0    polyethylene glycol (GLYCOLAX) packet Take 17 g by mouth daily      carvedilol (COREG) 12.5 MG tablet Take 1 tablet by mouth 2 times daily (with meals)      HYDROcodone-acetaminophen (NORCO) 7.5-325 MG per tablet Take 1 tablet by mouth every 4 hours as needed for Pain. atorvastatin (LIPITOR) 40 MG tablet Take 1 tablet by mouth nightly       No current facility-administered medications for this visit.

## 2024-01-02 NOTE — TELEPHONE ENCOUNTER
The patient is requesting a 90 day supply   For the medication listed below.    Medication  isosorbide mononitrate (IMDUR) 30 MG extended release tablet [[id number]]  isosorbide mononitrate (IMDUR) 30 MG extended release tablet [[id number]]     Order Details  Dose: 30 mg Route: Oral Frequency: DAILY   Dispense Quantity: 30 tablet Refills: 5          Sig: Take 1 tablet by mouth daily         Pharmacy    [id number] - [place], [place] - [facility] - P [phone] - F [phone]   [facility] [address] [facility] [id number]   Phone:  [phone]  Fax:  [phone]

## 2024-03-07 ENCOUNTER — OFFICE VISIT (OUTPATIENT)
Dept: CARDIOLOGY CLINIC | Age: 64
End: 2024-03-07
Payer: MEDICARE

## 2024-03-07 VITALS
BODY MASS INDEX: 40.94 KG/M2 | HEART RATE: 68 BPM | WEIGHT: 246 LBS | DIASTOLIC BLOOD PRESSURE: 70 MMHG | SYSTOLIC BLOOD PRESSURE: 134 MMHG

## 2024-03-07 DIAGNOSIS — I50.32 CHRONIC DIASTOLIC CONGESTIVE HEART FAILURE (HCC): Primary | ICD-10-CM

## 2024-03-07 DIAGNOSIS — I10 ESSENTIAL HYPERTENSION: ICD-10-CM

## 2024-03-07 DIAGNOSIS — I48.0 PAROXYSMAL ATRIAL FIBRILLATION (HCC): ICD-10-CM

## 2024-03-07 DIAGNOSIS — R94.31 ABNORMAL EKG: ICD-10-CM

## 2024-03-07 PROCEDURE — 1090F PRES/ABSN URINE INCON ASSESS: CPT | Performed by: INTERNAL MEDICINE

## 2024-03-07 PROCEDURE — G8484 FLU IMMUNIZE NO ADMIN: HCPCS | Performed by: INTERNAL MEDICINE

## 2024-03-07 PROCEDURE — G8400 PT W/DXA NO RESULTS DOC: HCPCS | Performed by: INTERNAL MEDICINE

## 2024-03-07 PROCEDURE — G8417 CALC BMI ABV UP PARAM F/U: HCPCS | Performed by: INTERNAL MEDICINE

## 2024-03-07 PROCEDURE — 99214 OFFICE O/P EST MOD 30 MIN: CPT | Performed by: INTERNAL MEDICINE

## 2024-03-07 PROCEDURE — 3078F DIAST BP <80 MM HG: CPT | Performed by: INTERNAL MEDICINE

## 2024-03-07 PROCEDURE — 3075F SYST BP GE 130 - 139MM HG: CPT | Performed by: INTERNAL MEDICINE

## 2024-03-07 PROCEDURE — 3017F COLORECTAL CA SCREEN DOC REV: CPT | Performed by: INTERNAL MEDICINE

## 2024-03-07 PROCEDURE — 1123F ACP DISCUSS/DSCN MKR DOCD: CPT | Performed by: INTERNAL MEDICINE

## 2024-03-07 PROCEDURE — 1036F TOBACCO NON-USER: CPT | Performed by: INTERNAL MEDICINE

## 2024-03-07 PROCEDURE — G8427 DOCREV CUR MEDS BY ELIG CLIN: HCPCS | Performed by: INTERNAL MEDICINE

## 2024-03-07 RX ORDER — SEMAGLUTIDE 1.34 MG/ML
INJECTION, SOLUTION SUBCUTANEOUS
COMMUNITY

## 2024-03-07 NOTE — PROGRESS NOTES
Subjective:      Patient ID: Veronica Vargas is a 63 y.o. female.    HPI Mrs. Vargas is here today for follow up  for afib/BP/abn ekg/chf.  Chest tightness over past month.  Moderate. Unpredictable.  Lasting 2-3 minutes. No exertional cp/sob.   Rhythm stable.  No tachycardia.  No syncope.   BP doing well at home. Still smoking.  No pnd or orthopnea.  No palp.     Past Medical History:   Diagnosis Date    CHF (congestive heart failure) (HCC)     Chronic kidney disease     Diabetes mellitus (HCC)     Hyperlipidemia     Hypertension     MDRO (multiple drug resistant organisms) resistance 6/3/2016    E Coli-urine     Past Surgical History:   Procedure Laterality Date    COLONOSCOPY      ENDOSCOPY, COLON, DIAGNOSTIC      TUBAL LIGATION           Allergies   Allergen Reactions    Dicumarol      Other    Percocet [Oxycodone-Acetaminophen] Itching    Warfarin And Related Hives     Other          Social History     Socioeconomic History    Marital status: Single     Spouse name: Not on file    Number of children: Not on file    Years of education: Not on file    Highest education level: Not on file   Occupational History    Not on file   Tobacco Use    Smoking status: Former     Current packs/day: 0.00     Types: Cigarettes     Quit date: 2016     Years since quittin.1    Smokeless tobacco: Never   Vaping Use    Vaping Use: Never used   Substance and Sexual Activity    Alcohol use: Yes     Comment: about 1 drink weekly    Drug use: No    Sexual activity: Not on file   Other Topics Concern    Not on file   Social History Narrative    Not on file     Social Determinants of Health     Financial Resource Strain: Not on file   Food Insecurity: Not on file   Transportation Needs: Not on file   Physical Activity: Not on file   Stress: Not on file   Social Connections: Not on file   Intimate Partner Violence: Not on file   Housing Stability: Not on file        FH reviewed, denies FH cardiac issues      Patient  has a

## 2024-04-16 RX ORDER — AMIODARONE HYDROCHLORIDE 200 MG/1
200 TABLET ORAL DAILY
Qty: 90 TABLET | Refills: 3 | Status: SHIPPED | OUTPATIENT
Start: 2024-04-16

## 2024-06-11 ENCOUNTER — TELEPHONE (OUTPATIENT)
Dept: CARDIOLOGY CLINIC | Age: 64
End: 2024-06-11

## 2024-06-11 NOTE — TELEPHONE ENCOUNTER
[facility] Medication Refills:    Medication: Isosorbide     Dosage: 30 mg     Number: 90    Refills: 3    Last Office Visit: 03/07/2024    Next Office Visit: 06/19/2024    Last Refill: 12/07/2023    Last Labs: 06/05/2024 CBC                   12/07/2023 PTH/ Renal/ Iron & TIBC

## 2024-06-19 ENCOUNTER — OFFICE VISIT (OUTPATIENT)
Dept: CARDIOLOGY CLINIC | Age: 64
End: 2024-06-19
Payer: MEDICARE

## 2024-06-19 VITALS
WEIGHT: 234 LBS | DIASTOLIC BLOOD PRESSURE: 70 MMHG | SYSTOLIC BLOOD PRESSURE: 134 MMHG | HEART RATE: 70 BPM | BODY MASS INDEX: 38.94 KG/M2

## 2024-06-19 DIAGNOSIS — I48.0 PAROXYSMAL ATRIAL FIBRILLATION (HCC): ICD-10-CM

## 2024-06-19 DIAGNOSIS — R94.31 ABNORMAL EKG: ICD-10-CM

## 2024-06-19 DIAGNOSIS — I10 ESSENTIAL HYPERTENSION: ICD-10-CM

## 2024-06-19 DIAGNOSIS — I50.32 CHRONIC DIASTOLIC CONGESTIVE HEART FAILURE (HCC): Primary | ICD-10-CM

## 2024-06-19 PROCEDURE — 1036F TOBACCO NON-USER: CPT | Performed by: INTERNAL MEDICINE

## 2024-06-19 PROCEDURE — 99214 OFFICE O/P EST MOD 30 MIN: CPT | Performed by: INTERNAL MEDICINE

## 2024-06-19 PROCEDURE — 1123F ACP DISCUSS/DSCN MKR DOCD: CPT | Performed by: INTERNAL MEDICINE

## 2024-06-19 PROCEDURE — G8427 DOCREV CUR MEDS BY ELIG CLIN: HCPCS | Performed by: INTERNAL MEDICINE

## 2024-06-19 PROCEDURE — 3017F COLORECTAL CA SCREEN DOC REV: CPT | Performed by: INTERNAL MEDICINE

## 2024-06-19 PROCEDURE — G8417 CALC BMI ABV UP PARAM F/U: HCPCS | Performed by: INTERNAL MEDICINE

## 2024-06-19 PROCEDURE — 3075F SYST BP GE 130 - 139MM HG: CPT | Performed by: INTERNAL MEDICINE

## 2024-06-19 PROCEDURE — 1090F PRES/ABSN URINE INCON ASSESS: CPT | Performed by: INTERNAL MEDICINE

## 2024-06-19 PROCEDURE — G8400 PT W/DXA NO RESULTS DOC: HCPCS | Performed by: INTERNAL MEDICINE

## 2024-06-19 PROCEDURE — 3078F DIAST BP <80 MM HG: CPT | Performed by: INTERNAL MEDICINE

## 2024-06-19 RX ORDER — ISOSORBIDE MONONITRATE 30 MG/1
30 TABLET, EXTENDED RELEASE ORAL DAILY
Qty: 90 TABLET | Refills: 3 | Status: SHIPPED | OUTPATIENT
Start: 2024-06-19

## 2024-06-19 NOTE — PROGRESS NOTES
hypertension        4. Abnormal EKG                Plan:   Assessment & Plan   CV stable.  No cp.    Compensated.  BP good  Rhythm stable. No ac due to GI bleed.  Empiric imdur 30 qd.    Stopped smoking about yr ago..  wt up.  Will continue amio for afib.  Continue torsemide/coreg for CHF. Thyroid fxn/ PFTs-diffusion capacity since on amio since did not get from last visit.  She will not get.   No changes.  Reviewed previous records and testing including myoview 2/20.   Did not get myoview as ordered 12/23..   Continue to monitor. Follow up 6 months.

## 2024-07-09 ENCOUNTER — OFFICE VISIT (OUTPATIENT)
Dept: CARDIOLOGY CLINIC | Age: 64
End: 2024-07-09
Payer: MEDICARE

## 2024-07-09 ENCOUNTER — ANCILLARY PROCEDURE (OUTPATIENT)
Dept: CARDIOLOGY CLINIC | Age: 64
End: 2024-07-09

## 2024-07-09 VITALS
SYSTOLIC BLOOD PRESSURE: 124 MMHG | WEIGHT: 236 LBS | HEART RATE: 68 BPM | BODY MASS INDEX: 39.27 KG/M2 | DIASTOLIC BLOOD PRESSURE: 80 MMHG

## 2024-07-09 DIAGNOSIS — R55 PRE-SYNCOPE: ICD-10-CM

## 2024-07-09 DIAGNOSIS — R55 PRE-SYNCOPE: Primary | ICD-10-CM

## 2024-07-09 PROCEDURE — 3079F DIAST BP 80-89 MM HG: CPT | Performed by: NURSE PRACTITIONER

## 2024-07-09 PROCEDURE — 3074F SYST BP LT 130 MM HG: CPT | Performed by: NURSE PRACTITIONER

## 2024-07-09 PROCEDURE — G8400 PT W/DXA NO RESULTS DOC: HCPCS | Performed by: NURSE PRACTITIONER

## 2024-07-09 PROCEDURE — G8427 DOCREV CUR MEDS BY ELIG CLIN: HCPCS | Performed by: NURSE PRACTITIONER

## 2024-07-09 PROCEDURE — 99215 OFFICE O/P EST HI 40 MIN: CPT | Performed by: NURSE PRACTITIONER

## 2024-07-09 PROCEDURE — G8417 CALC BMI ABV UP PARAM F/U: HCPCS | Performed by: NURSE PRACTITIONER

## 2024-07-09 PROCEDURE — 1090F PRES/ABSN URINE INCON ASSESS: CPT | Performed by: NURSE PRACTITIONER

## 2024-07-09 PROCEDURE — 1123F ACP DISCUSS/DSCN MKR DOCD: CPT | Performed by: NURSE PRACTITIONER

## 2024-07-09 PROCEDURE — 3017F COLORECTAL CA SCREEN DOC REV: CPT | Performed by: NURSE PRACTITIONER

## 2024-07-09 PROCEDURE — 1036F TOBACCO NON-USER: CPT | Performed by: NURSE PRACTITIONER

## 2024-07-09 RX ORDER — TORSEMIDE 20 MG/1
40 TABLET ORAL DAILY
Qty: 30 TABLET | Refills: 3 | Status: SHIPPED | OUTPATIENT
Start: 2024-07-09

## 2024-07-09 NOTE — PROGRESS NOTES
fatigue, or weakness.  HEENT: No new vision difficulties or ringing in the ears.  RESPIRATORY: No new SOB, PND, orthopnea or cough.   CARDIOVASCULAR: See HPI  GI: No N/V/D, constipation, or abdominal pain. No black/tarry stools  : No urinary urgency, incontinence, or hematuria.  SKIN: No cyanosis or skin lesions.  MUSCULOSKELETAL: No new muscle or joint pain.  NEUROLOGICAL: No syncope or TIA-like symptoms.  PSYCHIATRIC: No anxiety, pain, insomnia or depression        Objective:   PHYSICAL EXAM:        Vitals:    07/09/24 1002   BP: 124/80   Pulse: 68   Weight: 107 kg (236 lb)      VITALS:  /80   Pulse 68   Wt 107 kg (236 lb)   BMI 39.27 kg/m²   CONSTITUTIONAL: Cooperative, no apparent distress, and appears well nourished / developed  NEUROLOGIC:  Awake and orientated to person, place, and time.  PSYCH: Calm affect.  SKIN: Warm and dry.  HEENT: Sclera non-icteric, normocephalic, neck supple.  RESPIRATORY:  No increased work of breathing and clear to auscultation, no crackles or wheezing.  CARDIOVASCULAR:  Regular rate and rhythm without murmur. Normal S1 and S2. No gallops or rubs. Normal PMI. No elevation of JVP. Normal carotid pulses with no bruits.    GI:  Normal bowel sounds. Non-distended. Non-tender to palpation  EXT: No edema. No calf tenderness. Pulses are present bilaterally.    Wt Readings from Last 3 Encounters:   07/09/24 107 kg (236 lb)   06/19/24 106.1 kg (234 lb)   06/11/24 110.2 kg (243 lb)      Pulse Readings from Last 3 Encounters:   07/09/24 68   06/19/24 70   06/11/24 69     BP Readings from Last 3 Encounters:   07/09/24 124/80   06/19/24 134/70   06/11/24 139/73        DATA:    Lab Results   Component Value Date    ALT 10 01/27/2023    AST 11 (L) 01/27/2023    ALKPHOS 88 01/27/2023    BILITOT <0.2 01/27/2023     Lab Results   Component Value Date    CREATININE 2.3 (H) 12/01/2023    BUN 29 (H) 12/01/2023     12/01/2023    K 5.0 12/01/2023     12/01/2023    CO2 25 12/01/2023

## 2024-07-09 NOTE — NURSING NOTE
14 day CAM monitor placed during OV  Ordered by Claudia Rubalcava  Placed By Jessica LEIJA   Dx: Syncope  CAM ID # [id number]

## 2024-09-24 ENCOUNTER — TELEPHONE (OUTPATIENT)
Dept: CARDIOLOGY CLINIC | Age: 64
End: 2024-09-24

## 2024-11-01 ENCOUNTER — HOSPITAL ENCOUNTER (EMERGENCY)
Age: 64
Discharge: HOME | End: 2024-11-01
Attending: EMERGENCY MEDICINE
Payer: MEDICARE

## 2024-11-01 ENCOUNTER — APPOINTMENT (OUTPATIENT)
Dept: GENERAL RADIOLOGY | Age: 64
End: 2024-11-01
Payer: MEDICARE

## 2024-11-01 VITALS
HEIGHT: 65 IN | DIASTOLIC BLOOD PRESSURE: 68 MMHG | RESPIRATION RATE: 16 BRPM | WEIGHT: 241.4 LBS | HEART RATE: 74 BPM | TEMPERATURE: 98.7 F | BODY MASS INDEX: 40.22 KG/M2 | SYSTOLIC BLOOD PRESSURE: 185 MMHG | OXYGEN SATURATION: 100 %

## 2024-11-01 DIAGNOSIS — R06.89 DYSPNEA AND RESPIRATORY ABNORMALITIES: Primary | ICD-10-CM

## 2024-11-01 DIAGNOSIS — R06.00 DYSPNEA AND RESPIRATORY ABNORMALITIES: Primary | ICD-10-CM

## 2024-11-01 LAB
ANION GAP SERPL CALCULATED.3IONS-SCNC: 10 MMOL/L (ref 3–16)
BASE EXCESS BLDV CALC-SCNC: 0.1 MMOL/L (ref -2–3)
BASOPHILS # BLD: 0 K/UL (ref 0–0.2)
BASOPHILS NFR BLD: 0.4 %
BUN SERPL-MCNC: 26 MG/DL (ref 7–20)
CALCIUM SERPL-MCNC: 9.4 MG/DL (ref 8.3–10.6)
CHLORIDE SERPL-SCNC: 105 MMOL/L (ref 99–110)
CO2 BLDV-SCNC: 28 MMOL/L
CO2 SERPL-SCNC: 24 MMOL/L (ref 21–32)
COHGB MFR BLDV: 3.2 % (ref 0–1.5)
CREAT SERPL-MCNC: 2.1 MG/DL (ref 0.6–1.2)
DEPRECATED RDW RBC AUTO: 17.5 % (ref 12.4–15.4)
DO-HGB MFR BLDV: 27.2 %
EKG ATRIAL RATE: 75 BPM
EKG DIAGNOSIS: NORMAL
EKG P AXIS: 36 DEGREES
EKG P-R INTERVAL: 172 MS
EKG Q-T INTERVAL: 398 MS
EKG QRS DURATION: 92 MS
EKG QTC CALCULATION (BAZETT): 444 MS
EKG R AXIS: 55 DEGREES
EKG T AXIS: 102 DEGREES
EKG VENTRICULAR RATE: 75 BPM
EOSINOPHIL # BLD: 0.2 K/UL (ref 0–0.6)
EOSINOPHIL NFR BLD: 1.6 %
FLUAV RNA RESP QL NAA+PROBE: NOT DETECTED
FLUBV RNA RESP QL NAA+PROBE: NOT DETECTED
GFR SERPLBLD CREATININE-BSD FMLA CKD-EPI: 25 ML/MIN/{1.73_M2}
GLUCOSE SERPL-MCNC: 116 MG/DL (ref 70–99)
HCO3 BLDV-SCNC: 26.1 MMOL/L (ref 24–28)
HCT VFR BLD AUTO: 31.8 % (ref 36–48)
HGB BLD-MCNC: 9.8 G/DL (ref 12–16)
LYMPHOCYTES # BLD: 1.1 K/UL (ref 1–5.1)
LYMPHOCYTES NFR BLD: 10.7 %
MCH RBC QN AUTO: 28.1 PG (ref 26–34)
MCHC RBC AUTO-ENTMCNC: 30.9 G/DL (ref 31–36)
MCV RBC AUTO: 90.8 FL (ref 80–100)
METHGB MFR BLDV: 0.2 % (ref 0–1.5)
MONOCYTES # BLD: 0.6 K/UL (ref 0–1.3)
MONOCYTES NFR BLD: 6.4 %
NEUTROPHILS # BLD: 8 K/UL (ref 1.7–7.7)
NEUTROPHILS NFR BLD: 80.9 %
NT-PROBNP SERPL-MCNC: 1904 PG/ML (ref 0–124)
PCO2 BLDV: 47.5 MMHG (ref 41–51)
PH BLDV: 7.35 [PH] (ref 7.35–7.45)
PLATELET # BLD AUTO: 275 K/UL (ref 135–450)
PMV BLD AUTO: 8.1 FL (ref 5–10.5)
PO2 BLDV: 39 MMHG (ref 25–40)
POTASSIUM SERPL-SCNC: 4.7 MMOL/L (ref 3.5–5.1)
RBC # BLD AUTO: 3.51 M/UL (ref 4–5.2)
SAO2 % BLDV: 72 %
SARS-COV-2 RNA RESP QL NAA+PROBE: NOT DETECTED
SODIUM SERPL-SCNC: 139 MMOL/L (ref 136–145)
TROPONIN, HIGH SENSITIVITY: 13 NG/L (ref 0–14)
TROPONIN, HIGH SENSITIVITY: 13 NG/L (ref 0–14)
WBC # BLD AUTO: 9.8 K/UL (ref 4–11)

## 2024-11-01 PROCEDURE — 84484 ASSAY OF TROPONIN QUANT: CPT

## 2024-11-01 PROCEDURE — 6360000002 HC RX W HCPCS: Performed by: PHYSICIAN ASSISTANT

## 2024-11-01 PROCEDURE — 80048 BASIC METABOLIC PNL TOTAL CA: CPT

## 2024-11-01 PROCEDURE — 94761 N-INVAS EAR/PLS OXIMETRY MLT: CPT

## 2024-11-01 PROCEDURE — 6370000000 HC RX 637 (ALT 250 FOR IP): Performed by: PHYSICIAN ASSISTANT

## 2024-11-01 PROCEDURE — 99285 EMERGENCY DEPT VISIT HI MDM: CPT

## 2024-11-01 PROCEDURE — 96374 THER/PROPH/DIAG INJ IV PUSH: CPT

## 2024-11-01 PROCEDURE — 36415 COLL VENOUS BLD VENIPUNCTURE: CPT

## 2024-11-01 PROCEDURE — 94640 AIRWAY INHALATION TREATMENT: CPT

## 2024-11-01 PROCEDURE — 83880 ASSAY OF NATRIURETIC PEPTIDE: CPT

## 2024-11-01 PROCEDURE — 93005 ELECTROCARDIOGRAM TRACING: CPT | Performed by: EMERGENCY MEDICINE

## 2024-11-01 PROCEDURE — 71045 X-RAY EXAM CHEST 1 VIEW: CPT

## 2024-11-01 PROCEDURE — 85025 COMPLETE CBC W/AUTO DIFF WBC: CPT

## 2024-11-01 PROCEDURE — 96375 TX/PRO/DX INJ NEW DRUG ADDON: CPT

## 2024-11-01 PROCEDURE — 82803 BLOOD GASES ANY COMBINATION: CPT

## 2024-11-01 PROCEDURE — 2580000003 HC RX 258: Performed by: PHYSICIAN ASSISTANT

## 2024-11-01 PROCEDURE — 87636 SARSCOV2 & INF A&B AMP PRB: CPT

## 2024-11-01 PROCEDURE — 2700000000 HC OXYGEN THERAPY PER DAY

## 2024-11-01 RX ORDER — AZITHROMYCIN 250 MG/1
500 TABLET, FILM COATED ORAL ONCE
Status: COMPLETED | OUTPATIENT
Start: 2024-11-01 | End: 2024-11-01

## 2024-11-01 RX ORDER — PREDNISONE 10 MG/1
TABLET ORAL
Qty: 20 TABLET | Refills: 0 | Status: SHIPPED | OUTPATIENT
Start: 2024-11-01 | End: 2024-11-11

## 2024-11-01 RX ORDER — FUROSEMIDE 10 MG/ML
20 INJECTION INTRAMUSCULAR; INTRAVENOUS ONCE
Status: COMPLETED | OUTPATIENT
Start: 2024-11-01 | End: 2024-11-01

## 2024-11-01 RX ORDER — IPRATROPIUM BROMIDE AND ALBUTEROL SULFATE 2.5; .5 MG/3ML; MG/3ML
1 SOLUTION RESPIRATORY (INHALATION) ONCE
Status: COMPLETED | OUTPATIENT
Start: 2024-11-01 | End: 2024-11-01

## 2024-11-01 RX ORDER — AZITHROMYCIN 250 MG/1
TABLET, FILM COATED ORAL
Qty: 6 TABLET | Refills: 0 | Status: SHIPPED | OUTPATIENT
Start: 2024-11-01 | End: 2024-11-11

## 2024-11-01 RX ORDER — LORATADINE 10 MG/1
TABLET ORAL
COMMUNITY
Start: 2024-10-30

## 2024-11-01 RX ORDER — ALBUTEROL SULFATE 90 UG/1
2 INHALANT RESPIRATORY (INHALATION) EVERY 4 HOURS PRN
Qty: 18 G | Refills: 0 | Status: SHIPPED | OUTPATIENT
Start: 2024-11-01

## 2024-11-01 RX ORDER — FERROUS SULFATE 325(65) MG
1 TABLET ORAL DAILY
COMMUNITY
Start: 2024-10-25

## 2024-11-01 RX ORDER — ALBUTEROL SULFATE 0.83 MG/ML
2.5 SOLUTION RESPIRATORY (INHALATION) ONCE
Status: COMPLETED | OUTPATIENT
Start: 2024-11-01 | End: 2024-11-01

## 2024-11-01 RX ORDER — PREDNISONE 20 MG/1
40 TABLET ORAL ONCE
Status: COMPLETED | OUTPATIENT
Start: 2024-11-01 | End: 2024-11-01

## 2024-11-01 RX ADMIN — CEFTRIAXONE 1000 MG: 1 INJECTION, POWDER, FOR SOLUTION INTRAMUSCULAR; INTRAVENOUS at 12:51

## 2024-11-01 RX ADMIN — FUROSEMIDE 20 MG: 10 INJECTION, SOLUTION INTRAMUSCULAR; INTRAVENOUS at 12:59

## 2024-11-01 RX ADMIN — PREDNISONE 40 MG: 20 TABLET ORAL at 12:15

## 2024-11-01 RX ADMIN — ALBUTEROL SULFATE 2.5 MG: 2.5 SOLUTION RESPIRATORY (INHALATION) at 10:07

## 2024-11-01 RX ADMIN — AZITHROMYCIN DIHYDRATE 500 MG: 250 TABLET, FILM COATED ORAL at 12:50

## 2024-11-01 RX ADMIN — IPRATROPIUM BROMIDE AND ALBUTEROL SULFATE 1 DOSE: 2.5; .5 SOLUTION RESPIRATORY (INHALATION) at 10:08

## 2024-11-01 ASSESSMENT — ENCOUNTER SYMPTOMS
NAUSEA: 0
SHORTNESS OF BREATH: 1
EYE REDNESS: 0
ABDOMINAL PAIN: 0
VOMITING: 0
WHEEZING: 1
DIARRHEA: 0
COUGH: 1

## 2024-11-01 ASSESSMENT — PAIN - FUNCTIONAL ASSESSMENT: PAIN_FUNCTIONAL_ASSESSMENT: NONE - DENIES PAIN

## 2024-11-01 NOTE — DISCHARGE INSTRUCTIONS
Take antibiotics as prescribed until complete.  Take steroids as prescribed until complete.  Use albuterol inhaler with spacer 2 puffs every 4 hours as needed for shortness of breath and wheezing.    Follow-up with your primary care physician.    Return to emergency department with new or worsening symptoms.

## 2024-11-01 NOTE — ED PROVIDER NOTES
THE [facility]  EMERGENCY DEPARTMENT ENCOUNTER          PHYSICIAN ASSISTANT NOTE       Date of evaluation: 11/1/2024    Chief Complaint     Shortness of Breath (Cough/sob started Wednesday )      History of Present Illness     Veronica Vargas is a 64 y.o. female with past medical history of atrial fibrillation, CHF, CKD, diabetes type 2 who presents with 2 days of productive cough of clear sputum and increased shortness of breath.  Patient denies associated fevers, chest pain, nausea or vomiting, diarrhea, leg swelling or pain.  She denies ill contacts.  She is not vaccinated for COVID or influenza.  Patient denies history of COPD.  Denies inhaler use.  Denies tobacco use.    ASSESSMENT / PLAN  (MEDICAL DECISION MAKING)     INITIAL VITALS: BP: (!) 180/70, Temp: 98.7 °F (37.1 °C), Pulse: 84, Respirations: 26, SpO2: (!) 88 %    Veronica Vargas is a 64 y.o. female with past medical history of atrial fibrillation, CHF, CKD, diabetes type 2 who presents with 2 days of productive cough of clear sputum and increased shortness of breath.  Patient denies associated fevers, chest pain, nausea or vomiting, diarrhea, leg swelling or pain.  On arrival, she is hypoxic to 88% on room air and was placed on 2 L nasal cannula with oxygenation around 92%.  She is afebrile.  She is mildly tachypneic and is hypertensive.  Heart rhythm is regular.  Lung sounds with rhonchi and expiratory wheeze diffusely.  No increased work of breathing.  Abdomen is soft and nontender.  No peripheral edema or calf tenderness.    EKG obtained revealed normal sinus rhythm without acute ischemic changes.  CXR reveals ill-defined bilateral perihilar opacities possibly representing mild  interstitial edema.  Mild right infrahilar airspace disease or atelectasis.  COVID/influenza negative.  Labs with normal pH, white blood cell count 9.8, hemoglobin 9.8 (stable), creatinine 2.1 (stable), potassium 4.7, high sensitive troponin 13--> 13.  proBNP  performed during the hospital encounter of 11/01/24   COVID-19 & Influenza Combo    Specimen: Nasopharyngeal Swab   Result Value Ref Range    SARS-CoV-2 RNA, RT PCR NOT DETECTED NOT DETECTED    Influenza A NOT DETECTED NOT DETECTED    Influenza B NOT DETECTED NOT DETECTED   BNP   Result Value Ref Range    NT Pro-BNP 1,904 (H) 0 - 124 pg/mL   CBC with Auto Differential   Result Value Ref Range    WBC 9.8 4.0 - 11.0 K/uL    RBC 3.51 (L) 4.00 - 5.20 M/uL    Hemoglobin 9.8 (L) 12.0 - 16.0 g/dL    Hematocrit 31.8 (L) 36.0 - 48.0 %    MCV 90.8 80.0 - 100.0 fL    MCH 28.1 26.0 - 34.0 pg    MCHC 30.9 (L) 31.0 - 36.0 g/dL    RDW 17.5 (H) 12.4 - 15.4 %    Platelets 275 135 - 450 K/uL    MPV 8.1 5.0 - 10.5 fL    Neutrophils % 80.9 %    Lymphocytes % 10.7 %    Monocytes % 6.4 %    Eosinophils % 1.6 %    Basophils % 0.4 %    Neutrophils Absolute 8.0 (H) 1.7 - 7.7 K/uL    Lymphocytes Absolute 1.1 1.0 - 5.1 K/uL    Monocytes Absolute 0.6 0.0 - 1.3 K/uL    Eosinophils Absolute 0.2 0.0 - 0.6 K/uL    Basophils Absolute 0.0 0.0 - 0.2 K/uL   BMP w/ Reflex to MG   Result Value Ref Range    Sodium 139 136 - 145 mmol/L    Potassium reflex Magnesium 4.7 3.5 - 5.1 mmol/L    Chloride 105 99 - 110 mmol/L    CO2 24 21 - 32 mmol/L    Anion Gap 10 3 - 16    Glucose 116 (H) 70 - 99 mg/dL    BUN 26 (H) 7 - 20 mg/dL    Creatinine 2.1 (H) 0.6 - 1.2 mg/dL    Est, Glom Filt Rate 25 (A) >60    Calcium 9.4 8.3 - 10.6 mg/dL   Blood Gas, Venous   Result Value Ref Range    pH, Billy 7.349 (L) 7.350 - 7.450    pCO2, Billy 47.5 41.0 - 51.0 mmHg    PO2, Billy 39.0 25.0 - 40.0 mmHg    HCO3, Venous 26.1 24.0 - 28.0 mmol/L    Base Excess, Billy 0.1 -2.0 - 3.0 mmol/L    O2 Sat, Billy 72 Not established %    Carboxyhemoglobin 3.2 (H) 0.0 - 1.5 %    MetHgb, Billy 0.2 0.0 - 1.5 %    TC02 (Calc), Billy 28 mmol/L    Hemoglobin, Billy, Reduced 27.20 %   Troponin   Result Value Ref Range    Troponin, High Sensitivity 13 0 - 14 ng/L   Troponin   Result Value Ref Range    Troponin, High

## 2024-11-01 NOTE — ED PROVIDER NOTES
ED Attending Attestation Note     Date of evaluation: 11/1/2024    This patient was seen by the advance practice provider.  I have seen and examined the patient, agree with the workup, evaluation, management and diagnosis. The care plan has been discussed.  I have reviewed the ECG and concur with the FELISA's interpretation.  My assessment reveals adult female who presents with wheezing, rhonchi, shortness of breath.  She is somewhat improved reportedly after nebulizer treatment.  On my exam still has some rhonchi, does not appear in significant distress but does have a intermittently new oxygen requirement the satting into the upper 80s.  No obvious consolidation, there is some opacity in the bases may be atelectasis versus pneumonia, no fever no leukocytosis no signs of sepsis..       Osman Lopez MD  11/01/24 2851

## 2025-02-12 NOTE — PATIENT INSTRUCTIONS
Stop amiodarone        Thank you for choosing [facility] for your cardiac care.    During your visit today, we reviewed and confirmed your cardiac medications along with  medication prescribed by your other healthcare team members. Please be sure to discuss any  changes to medication with your providers.    Please bring a list of ALL medications (or the bottles) with you to EVERY appointment.  Also include vitamins and over-the-counter medications.    If you need refills for any cardiac medications, please call your pharmacy and they will reach out to us electronically.    Did your provider order testing today? If yes, then you will receive your results in three  possible ways. You can receive a Smart [employer]pack message, a phone call, or letter in the mail. Please  note, if you are an active Smart [employer] Backpack user, some of your testing will be available within 1-2 days.    Finally, please know that it is good for your heart to exercise and follow a healthy, low-fat diet  as advised by your physician and health care providers.    If you are experiencing a medical emergency, please call 911 immediately.    It's easy to register for a [employer] account if you don't already have one. With a [employer]  account you can manage your health record, view test results, schedule appointments and  more.     Dr. Pascual's clinical staff can be reached at the following phone number: [phone]    If any cardiac testing is ordered, please contact central scheduling at [phone] to get your test scheduled.

## 2025-02-12 NOTE — PROGRESS NOTES
12/12/24 108.1 kg (238 lb 6.4 oz)   11/01/24 109.5 kg (241 lb 6.4 oz)       Physical Exam  CONSTITUTIONAL: awake, alert, cooperative, no apparent distress, Oriented x 3.  Obese  HEENT/NECK : Normal. No JVD. No thyromegaly. No bruit  LUNGS: Good respiratory effort. . On auscultation bilateral equal air entry. Normal breath sounds  CARDIOVASCULAR: No left parasternal heave., Regular rate and rhythm, normal S1 and S2, no S3 or S4, and no murmur or rub noted.  ABDOMEN: Soft, nontender, Bowel sounds present. No masses or tenderness.  SKIN: Warm and dry. no jaundice and no petechiae  EXTREMITIES: No lower extremity edema. No cyanosis or clubbing.  NEUROLOGICAL : No gross focal neurological deficit  MUSCULOSKELETAL: Muscle tone: normal. No tenderness  GENITAL/URINARY: not assessed    Labs:   Reviewed    Lab Results   Component Value Date    WBC 9.8 11/01/2024    HGB 9.8 (L) 11/01/2024    HCT 31.8 (L) 11/01/2024    MCV 90.8 11/01/2024     11/01/2024     Lab Results   Component Value Date     12/05/2024    K 4.8 12/05/2024     12/05/2024    CO2 21 12/05/2024    BUN 35 (H) 12/05/2024    CREATININE 2.1 (H) 12/05/2024    GLUCOSE 105 (H) 12/05/2024    CALCIUM 9.6 12/05/2024    BILITOT <0.2 01/27/2023    ALKPHOS 88 01/27/2023    AST 11 (L) 01/27/2023    ALT 10 01/27/2023    LABGLOM 25 (A) 12/05/2024    GFRAA 29 (A) 08/25/2022    AGRATIO 1.3 01/27/2023    GLOB 2.6 07/30/2019         Lab Results   Component Value Date    CHOL 175 02/20/2020     Lab Results   Component Value Date    TRIG 35 02/20/2020     Lab Results   Component Value Date     (H) 02/20/2020    HDL 87 (H) 10/29/2019     No components found for: \"LDLCHOLESTEROL\", \"LDLCALC\"  Lab Results   Component Value Date    VLDL 7 02/20/2020    VLDL 12 10/29/2019     No results found for: \"CHOLHDLRATIO\"    Lab Results   Component Value Date    INR 1.08 01/27/2023    INR 1.41 (H) 01/31/2017    INR 1.4 09/08/2016    PROTIME 13.9 01/27/2023    PROTIME

## 2025-02-13 ENCOUNTER — HOSPITAL ENCOUNTER (OUTPATIENT)
Dept: GENERAL RADIOLOGY | Age: 65
Discharge: HOME | End: 2025-02-13
Payer: MEDICARE

## 2025-02-13 ENCOUNTER — OFFICE VISIT (OUTPATIENT)
Dept: CARDIOLOGY CLINIC | Age: 65
End: 2025-02-13
Payer: MEDICARE

## 2025-02-13 VITALS
DIASTOLIC BLOOD PRESSURE: 60 MMHG | HEART RATE: 87 BPM | WEIGHT: 232 LBS | BODY MASS INDEX: 38.61 KG/M2 | SYSTOLIC BLOOD PRESSURE: 134 MMHG | OXYGEN SATURATION: 94 %

## 2025-02-13 DIAGNOSIS — I48.0 PAROXYSMAL ATRIAL FIBRILLATION (HCC): ICD-10-CM

## 2025-02-13 DIAGNOSIS — I10 ESSENTIAL HYPERTENSION: Primary | ICD-10-CM

## 2025-02-13 DIAGNOSIS — M47.896 OTHER OSTEOARTHRITIS OF SPINE, LUMBAR REGION: ICD-10-CM

## 2025-02-13 PROCEDURE — 1160F RVW MEDS BY RX/DR IN RCRD: CPT | Performed by: INTERNAL MEDICINE

## 2025-02-13 PROCEDURE — G2211 COMPLEX E/M VISIT ADD ON: HCPCS | Performed by: INTERNAL MEDICINE

## 2025-02-13 PROCEDURE — 99214 OFFICE O/P EST MOD 30 MIN: CPT | Performed by: INTERNAL MEDICINE

## 2025-02-13 PROCEDURE — 3078F DIAST BP <80 MM HG: CPT | Performed by: INTERNAL MEDICINE

## 2025-02-13 PROCEDURE — 4004F PT TOBACCO SCREEN RCVD TLK: CPT | Performed by: INTERNAL MEDICINE

## 2025-02-13 PROCEDURE — G8427 DOCREV CUR MEDS BY ELIG CLIN: HCPCS | Performed by: INTERNAL MEDICINE

## 2025-02-13 PROCEDURE — 1159F MED LIST DOCD IN RCRD: CPT | Performed by: INTERNAL MEDICINE

## 2025-02-13 PROCEDURE — 1123F ACP DISCUSS/DSCN MKR DOCD: CPT | Performed by: INTERNAL MEDICINE

## 2025-02-13 PROCEDURE — G8417 CALC BMI ABV UP PARAM F/U: HCPCS | Performed by: INTERNAL MEDICINE

## 2025-02-13 PROCEDURE — 3075F SYST BP GE 130 - 139MM HG: CPT | Performed by: INTERNAL MEDICINE

## 2025-02-13 PROCEDURE — 1090F PRES/ABSN URINE INCON ASSESS: CPT | Performed by: INTERNAL MEDICINE

## 2025-02-13 PROCEDURE — G8400 PT W/DXA NO RESULTS DOC: HCPCS | Performed by: INTERNAL MEDICINE

## 2025-02-13 PROCEDURE — 3017F COLORECTAL CA SCREEN DOC REV: CPT | Performed by: INTERNAL MEDICINE

## 2025-02-13 PROCEDURE — 72110 X-RAY EXAM L-2 SPINE 4/>VWS: CPT

## 2025-02-13 RX ORDER — VARENICLINE TARTRATE 0.5 (11)-1
1 KIT ORAL DAILY
Qty: 1 EACH | Refills: 0 | Status: SHIPPED | OUTPATIENT
Start: 2025-02-13

## 2025-02-13 RX ORDER — ISOSORBIDE MONONITRATE 30 MG/1
30 TABLET, EXTENDED RELEASE ORAL DAILY
Qty: 90 TABLET | Refills: 3 | Status: SHIPPED | OUTPATIENT
Start: 2025-02-13

## 2025-02-13 RX ORDER — AMIODARONE HYDROCHLORIDE 200 MG/1
200 TABLET ORAL DAILY
Qty: 90 TABLET | Refills: 3 | Status: CANCELLED | OUTPATIENT
Start: 2025-02-13

## 2025-03-30 ENCOUNTER — APPOINTMENT (OUTPATIENT)
Dept: CT IMAGING | Age: 65
DRG: 823 | End: 2025-03-30
Payer: MEDICARE

## 2025-03-30 ENCOUNTER — APPOINTMENT (OUTPATIENT)
Dept: GENERAL RADIOLOGY | Age: 65
DRG: 823 | End: 2025-03-30
Payer: MEDICARE

## 2025-03-30 ENCOUNTER — HOSPITAL ENCOUNTER (INPATIENT)
Age: 65
LOS: 3 days | Discharge: HOME | DRG: 823 | End: 2025-04-02
Admitting: INTERNAL MEDICINE
Payer: MEDICARE

## 2025-03-30 DIAGNOSIS — I31.39 PERICARDIAL EFFUSION: ICD-10-CM

## 2025-03-30 DIAGNOSIS — J96.01 ACUTE HYPOXIC RESPIRATORY FAILURE: Primary | ICD-10-CM

## 2025-03-30 DIAGNOSIS — R59.1 LYMPHADENOPATHY: ICD-10-CM

## 2025-03-30 DIAGNOSIS — I42.9 CARDIOMYOPATHY, UNSPECIFIED TYPE (HCC): ICD-10-CM

## 2025-03-30 PROBLEM — R91.8 LUNG MASS: Status: ACTIVE | Noted: 2025-03-30

## 2025-03-30 LAB
ANION GAP SERPL CALCULATED.3IONS-SCNC: 12 MMOL/L (ref 3–16)
BASE EXCESS BLDV CALC-SCNC: -1.9 MMOL/L (ref -2–3)
BASOPHILS # BLD: 0 K/UL (ref 0–0.2)
BASOPHILS NFR BLD: 0.4 %
BUN SERPL-MCNC: 24 MG/DL (ref 7–20)
CALCIUM SERPL-MCNC: 9 MG/DL (ref 8.3–10.6)
CHLORIDE SERPL-SCNC: 107 MMOL/L (ref 99–110)
CO2 BLDV-SCNC: 25 MMOL/L
CO2 SERPL-SCNC: 21 MMOL/L (ref 21–32)
COHGB MFR BLDV: 1.3 % (ref 0–1.5)
CREAT SERPL-MCNC: 1.8 MG/DL (ref 0.6–1.2)
DEPRECATED RDW RBC AUTO: 17.3 % (ref 12.4–15.4)
DO-HGB MFR BLDV: 13.5 %
EOSINOPHIL # BLD: 0.1 K/UL (ref 0–0.6)
EOSINOPHIL NFR BLD: 1.1 %
FERRITIN SERPL IA-MCNC: 768 NG/ML (ref 15–150)
FLUAV RNA RESP QL NAA+PROBE: NOT DETECTED
FLUBV RNA RESP QL NAA+PROBE: NOT DETECTED
GFR SERPLBLD CREATININE-BSD FMLA CKD-EPI: 31 ML/MIN/{1.73_M2}
GLUCOSE BLD-MCNC: 252 MG/DL (ref 70–99)
GLUCOSE BLD-MCNC: 273 MG/DL (ref 70–99)
GLUCOSE SERPL-MCNC: 161 MG/DL (ref 70–99)
HCO3 BLDV-SCNC: 23.7 MMOL/L (ref 24–28)
HCT VFR BLD AUTO: 29.2 % (ref 36–48)
HGB BLD-MCNC: 9.8 G/DL (ref 12–16)
LYMPHOCYTES # BLD: 1.4 K/UL (ref 1–5.1)
LYMPHOCYTES NFR BLD: 13.2 %
MCH RBC QN AUTO: 28.9 PG (ref 26–34)
MCHC RBC AUTO-ENTMCNC: 33.4 G/DL (ref 31–36)
MCV RBC AUTO: 86.5 FL (ref 80–100)
METHGB MFR BLDV: <0 % (ref 0–1.5)
MONOCYTES # BLD: 0.8 K/UL (ref 0–1.3)
MONOCYTES NFR BLD: 7.8 %
NEUTROPHILS # BLD: 8.4 K/UL (ref 1.7–7.7)
NEUTROPHILS NFR BLD: 77.5 %
NT-PROBNP SERPL-MCNC: 794 PG/ML (ref 0–124)
PCO2 BLDV: 43.2 MMHG (ref 41–51)
PERFORMED ON: ABNORMAL
PERFORMED ON: ABNORMAL
PH BLDV: 7.35 [PH] (ref 7.35–7.45)
PLATELET # BLD AUTO: 267 K/UL (ref 135–450)
PMV BLD AUTO: 7.4 FL (ref 5–10.5)
PO2 BLDV: 52.7 MMHG (ref 25–40)
POTASSIUM SERPL-SCNC: 4.5 MMOL/L (ref 3.5–5.1)
RBC # BLD AUTO: 3.37 M/UL (ref 4–5.2)
SAO2 % BLDV: 86 %
SARS-COV-2 RNA RESP QL NAA+PROBE: NOT DETECTED
SODIUM SERPL-SCNC: 140 MMOL/L (ref 136–145)
TROPONIN, HIGH SENSITIVITY: 12 NG/L (ref 0–14)
TROPONIN, HIGH SENSITIVITY: 13 NG/L (ref 0–14)
WBC # BLD AUTO: 10.8 K/UL (ref 4–11)

## 2025-03-30 PROCEDURE — 83540 ASSAY OF IRON: CPT

## 2025-03-30 PROCEDURE — 2700000000 HC OXYGEN THERAPY PER DAY

## 2025-03-30 PROCEDURE — 2500000003 HC RX 250 WO HCPCS

## 2025-03-30 PROCEDURE — 74176 CT ABD & PELVIS W/O CONTRAST: CPT

## 2025-03-30 PROCEDURE — 87636 SARSCOV2 & INF A&B AMP PRB: CPT

## 2025-03-30 PROCEDURE — 80048 BASIC METABOLIC PNL TOTAL CA: CPT

## 2025-03-30 PROCEDURE — 85025 COMPLETE CBC W/AUTO DIFF WBC: CPT

## 2025-03-30 PROCEDURE — 71275 CT ANGIOGRAPHY CHEST: CPT

## 2025-03-30 PROCEDURE — 1200000000 HC SEMI PRIVATE

## 2025-03-30 PROCEDURE — 6360000002 HC RX W HCPCS

## 2025-03-30 PROCEDURE — 6370000000 HC RX 637 (ALT 250 FOR IP)

## 2025-03-30 PROCEDURE — 94640 AIRWAY INHALATION TREATMENT: CPT

## 2025-03-30 PROCEDURE — 84484 ASSAY OF TROPONIN QUANT: CPT

## 2025-03-30 PROCEDURE — 71045 X-RAY EXAM CHEST 1 VIEW: CPT

## 2025-03-30 PROCEDURE — 6360000002 HC RX W HCPCS: Performed by: INTERNAL MEDICINE

## 2025-03-30 PROCEDURE — 94761 N-INVAS EAR/PLS OXIMETRY MLT: CPT

## 2025-03-30 PROCEDURE — 93005 ELECTROCARDIOGRAM TRACING: CPT

## 2025-03-30 PROCEDURE — 82728 ASSAY OF FERRITIN: CPT

## 2025-03-30 PROCEDURE — 99285 EMERGENCY DEPT VISIT HI MDM: CPT

## 2025-03-30 PROCEDURE — 83550 IRON BINDING TEST: CPT

## 2025-03-30 PROCEDURE — 82803 BLOOD GASES ANY COMBINATION: CPT

## 2025-03-30 PROCEDURE — 83880 ASSAY OF NATRIURETIC PEPTIDE: CPT

## 2025-03-30 PROCEDURE — 96374 THER/PROPH/DIAG INJ IV PUSH: CPT

## 2025-03-30 PROCEDURE — 6360000004 HC RX CONTRAST MEDICATION

## 2025-03-30 RX ORDER — ALBUTEROL SULFATE 0.83 MG/ML
2.5 SOLUTION RESPIRATORY (INHALATION)
Status: DISCONTINUED | OUTPATIENT
Start: 2025-03-30 | End: 2025-03-30

## 2025-03-30 RX ORDER — IPRATROPIUM BROMIDE AND ALBUTEROL SULFATE 2.5; .5 MG/3ML; MG/3ML
1 SOLUTION RESPIRATORY (INHALATION)
Status: DISCONTINUED | OUTPATIENT
Start: 2025-03-30 | End: 2025-03-30

## 2025-03-30 RX ORDER — AMLODIPINE BESYLATE 10 MG/1
10 TABLET ORAL DAILY
Status: DISCONTINUED | OUTPATIENT
Start: 2025-03-30 | End: 2025-04-02 | Stop reason: HOSPADM

## 2025-03-30 RX ORDER — PREDNISONE 20 MG/1
40 TABLET ORAL DAILY
Status: DISCONTINUED | OUTPATIENT
Start: 2025-03-31 | End: 2025-04-02 | Stop reason: HOSPADM

## 2025-03-30 RX ORDER — FAMOTIDINE 20 MG/1
40 TABLET, FILM COATED ORAL 2 TIMES DAILY PRN
Status: DISCONTINUED | OUTPATIENT
Start: 2025-03-30 | End: 2025-03-31

## 2025-03-30 RX ORDER — SODIUM CHLORIDE 0.9 % (FLUSH) 0.9 %
5-40 SYRINGE (ML) INJECTION PRN
Status: DISCONTINUED | OUTPATIENT
Start: 2025-03-30 | End: 2025-04-02 | Stop reason: HOSPADM

## 2025-03-30 RX ORDER — ALBUTEROL SULFATE 0.83 MG/ML
15 SOLUTION RESPIRATORY (INHALATION)
Status: DISCONTINUED | OUTPATIENT
Start: 2025-03-30 | End: 2025-03-30

## 2025-03-30 RX ORDER — DOCUSATE SODIUM 100 MG/1
100 CAPSULE, LIQUID FILLED ORAL 2 TIMES DAILY
Status: DISCONTINUED | OUTPATIENT
Start: 2025-03-30 | End: 2025-04-02 | Stop reason: HOSPADM

## 2025-03-30 RX ORDER — POTASSIUM CHLORIDE 7.45 MG/ML
10 INJECTION INTRAVENOUS PRN
Status: DISCONTINUED | OUTPATIENT
Start: 2025-03-30 | End: 2025-04-02

## 2025-03-30 RX ORDER — ALBUTEROL SULFATE 0.83 MG/ML
2.5 SOLUTION RESPIRATORY (INHALATION) EVERY 4 HOURS PRN
Status: DISCONTINUED | OUTPATIENT
Start: 2025-03-30 | End: 2025-04-02 | Stop reason: HOSPADM

## 2025-03-30 RX ORDER — SODIUM CHLORIDE 9 MG/ML
INJECTION, SOLUTION INTRAVENOUS PRN
Status: DISCONTINUED | OUTPATIENT
Start: 2025-03-30 | End: 2025-04-02 | Stop reason: HOSPADM

## 2025-03-30 RX ORDER — POTASSIUM CHLORIDE 1500 MG/1
40 TABLET, EXTENDED RELEASE ORAL PRN
Status: DISCONTINUED | OUTPATIENT
Start: 2025-03-30 | End: 2025-04-02

## 2025-03-30 RX ORDER — FAMOTIDINE 40 MG/1
40 TABLET, FILM COATED ORAL 2 TIMES DAILY PRN
COMMUNITY

## 2025-03-30 RX ORDER — IOPAMIDOL 755 MG/ML
75 INJECTION, SOLUTION INTRAVASCULAR
Status: COMPLETED | OUTPATIENT
Start: 2025-03-30 | End: 2025-03-30

## 2025-03-30 RX ORDER — ONDANSETRON 4 MG/1
4 TABLET, ORALLY DISINTEGRATING ORAL EVERY 8 HOURS PRN
Status: DISCONTINUED | OUTPATIENT
Start: 2025-03-30 | End: 2025-04-02 | Stop reason: HOSPADM

## 2025-03-30 RX ORDER — ALBUTEROL SULFATE 0.83 MG/ML
2.5 SOLUTION RESPIRATORY (INHALATION)
Status: DISCONTINUED | OUTPATIENT
Start: 2025-03-30 | End: 2025-04-02

## 2025-03-30 RX ORDER — SENNOSIDES 8.6 MG
1300 CAPSULE ORAL 2 TIMES DAILY
COMMUNITY

## 2025-03-30 RX ORDER — DOCUSATE SODIUM 100 MG/1
100 CAPSULE, LIQUID FILLED ORAL 2 TIMES DAILY
COMMUNITY

## 2025-03-30 RX ORDER — ACETAMINOPHEN 650 MG/1
650 SUPPOSITORY RECTAL EVERY 6 HOURS PRN
Status: DISCONTINUED | OUTPATIENT
Start: 2025-03-30 | End: 2025-04-02 | Stop reason: HOSPADM

## 2025-03-30 RX ORDER — ACETAMINOPHEN 325 MG/1
1300 TABLET ORAL 2 TIMES DAILY
Status: DISCONTINUED | OUTPATIENT
Start: 2025-03-30 | End: 2025-03-30

## 2025-03-30 RX ORDER — CETIRIZINE HYDROCHLORIDE 10 MG/1
10 TABLET ORAL DAILY
Status: DISCONTINUED | OUTPATIENT
Start: 2025-03-30 | End: 2025-04-02 | Stop reason: HOSPADM

## 2025-03-30 RX ORDER — ATORVASTATIN CALCIUM 40 MG/1
40 TABLET, FILM COATED ORAL NIGHTLY
Status: DISCONTINUED | OUTPATIENT
Start: 2025-03-30 | End: 2025-04-02 | Stop reason: HOSPADM

## 2025-03-30 RX ORDER — MAGNESIUM SULFATE IN WATER 40 MG/ML
2000 INJECTION, SOLUTION INTRAVENOUS PRN
Status: DISCONTINUED | OUTPATIENT
Start: 2025-03-30 | End: 2025-04-02

## 2025-03-30 RX ORDER — AMLODIPINE BESYLATE 10 MG/1
10 TABLET ORAL DAILY
COMMUNITY

## 2025-03-30 RX ORDER — OXYCODONE AND ACETAMINOPHEN 7.5; 325 MG/1; MG/1
1 TABLET ORAL EVERY 6 HOURS PRN
Status: DISCONTINUED | OUTPATIENT
Start: 2025-03-30 | End: 2025-04-02 | Stop reason: HOSPADM

## 2025-03-30 RX ORDER — SODIUM CHLORIDE 0.9 % (FLUSH) 0.9 %
5-40 SYRINGE (ML) INJECTION EVERY 12 HOURS SCHEDULED
Status: DISCONTINUED | OUTPATIENT
Start: 2025-03-30 | End: 2025-04-02 | Stop reason: HOSPADM

## 2025-03-30 RX ORDER — POLYETHYLENE GLYCOL 3350 17 G/17G
17 POWDER, FOR SOLUTION ORAL DAILY PRN
Status: DISCONTINUED | OUTPATIENT
Start: 2025-03-30 | End: 2025-04-02 | Stop reason: HOSPADM

## 2025-03-30 RX ORDER — CARVEDILOL 12.5 MG/1
12.5 TABLET ORAL 2 TIMES DAILY WITH MEALS
Status: DISCONTINUED | OUTPATIENT
Start: 2025-03-30 | End: 2025-04-02 | Stop reason: HOSPADM

## 2025-03-30 RX ORDER — INSULIN LISPRO 100 [IU]/ML
0-8 INJECTION, SOLUTION INTRAVENOUS; SUBCUTANEOUS
Status: DISCONTINUED | OUTPATIENT
Start: 2025-03-30 | End: 2025-04-02

## 2025-03-30 RX ORDER — ONDANSETRON 2 MG/ML
4 INJECTION INTRAMUSCULAR; INTRAVENOUS EVERY 6 HOURS PRN
Status: DISCONTINUED | OUTPATIENT
Start: 2025-03-30 | End: 2025-04-02 | Stop reason: HOSPADM

## 2025-03-30 RX ORDER — ISOSORBIDE MONONITRATE 60 MG/1
60 TABLET, EXTENDED RELEASE ORAL DAILY
Status: DISCONTINUED | OUTPATIENT
Start: 2025-03-31 | End: 2025-04-02 | Stop reason: HOSPADM

## 2025-03-30 RX ORDER — HEPARIN SODIUM 5000 [USP'U]/ML
5000 INJECTION, SOLUTION INTRAVENOUS; SUBCUTANEOUS EVERY 8 HOURS SCHEDULED
Status: DISCONTINUED | OUTPATIENT
Start: 2025-03-30 | End: 2025-04-02 | Stop reason: HOSPADM

## 2025-03-30 RX ORDER — ISOSORBIDE MONONITRATE 30 MG/1
30 TABLET, EXTENDED RELEASE ORAL ONCE
Status: COMPLETED | OUTPATIENT
Start: 2025-03-30 | End: 2025-03-30

## 2025-03-30 RX ORDER — ACETAMINOPHEN 325 MG/1
650 TABLET ORAL EVERY 6 HOURS PRN
Status: DISCONTINUED | OUTPATIENT
Start: 2025-03-30 | End: 2025-04-02 | Stop reason: HOSPADM

## 2025-03-30 RX ORDER — ISOSORBIDE MONONITRATE 30 MG/1
30 TABLET, EXTENDED RELEASE ORAL DAILY
Status: DISCONTINUED | OUTPATIENT
Start: 2025-03-30 | End: 2025-03-30

## 2025-03-30 RX ADMIN — ALBUTEROL SULFATE 2.5 MG: 2.5 SOLUTION RESPIRATORY (INHALATION) at 14:47

## 2025-03-30 RX ADMIN — AMLODIPINE BESYLATE 10 MG: 10 TABLET ORAL at 17:41

## 2025-03-30 RX ADMIN — SODIUM CHLORIDE, PRESERVATIVE FREE 10 ML: 5 INJECTION INTRAVENOUS at 21:15

## 2025-03-30 RX ADMIN — INSULIN LISPRO 4 UNITS: 100 INJECTION, SOLUTION INTRAVENOUS; SUBCUTANEOUS at 21:16

## 2025-03-30 RX ADMIN — OXYCODONE HYDROCHLORIDE AND ACETAMINOPHEN 1 TABLET: 7.5; 325 TABLET ORAL at 16:29

## 2025-03-30 RX ADMIN — ALBUTEROL SULFATE 2.5 MG: 2.5 SOLUTION RESPIRATORY (INHALATION) at 21:48

## 2025-03-30 RX ADMIN — ATORVASTATIN CALCIUM 40 MG: 40 TABLET, FILM COATED ORAL at 21:19

## 2025-03-30 RX ADMIN — ALBUTEROL SULFATE 2.5 MG: 2.5 SOLUTION RESPIRATORY (INHALATION) at 12:21

## 2025-03-30 RX ADMIN — ISOSORBIDE MONONITRATE 30 MG: 30 TABLET, EXTENDED RELEASE ORAL at 21:17

## 2025-03-30 RX ADMIN — IOPAMIDOL 75 ML: 755 INJECTION, SOLUTION INTRAVENOUS at 12:58

## 2025-03-30 RX ADMIN — DOCUSATE SODIUM 100 MG: 100 CAPSULE, LIQUID FILLED ORAL at 21:22

## 2025-03-30 RX ADMIN — ISOSORBIDE MONONITRATE 30 MG: 30 TABLET, EXTENDED RELEASE ORAL at 17:41

## 2025-03-30 RX ADMIN — CARVEDILOL 12.5 MG: 12.5 TABLET, FILM COATED ORAL at 16:29

## 2025-03-30 RX ADMIN — OXYCODONE HYDROCHLORIDE AND ACETAMINOPHEN 1 TABLET: 7.5; 325 TABLET ORAL at 22:45

## 2025-03-30 RX ADMIN — METHYLPREDNISOLONE SODIUM SUCCINATE 125 MG: 125 INJECTION INTRAMUSCULAR; INTRAVENOUS at 12:26

## 2025-03-30 ASSESSMENT — PAIN DESCRIPTION - ONSET
ONSET: ON-GOING
ONSET: ON-GOING
ONSET: PROGRESSIVE

## 2025-03-30 ASSESSMENT — PAIN DESCRIPTION - FREQUENCY
FREQUENCY: CONTINUOUS

## 2025-03-30 ASSESSMENT — PAIN DESCRIPTION - DESCRIPTORS
DESCRIPTORS: ACHING
DESCRIPTORS: STABBING
DESCRIPTORS: ACHING

## 2025-03-30 ASSESSMENT — PAIN SCALES - GENERAL
PAINLEVEL_OUTOF10: 3
PAINLEVEL_OUTOF10: 8
PAINLEVEL_OUTOF10: 10
PAINLEVEL_OUTOF10: 5
PAINLEVEL_OUTOF10: 10
PAINLEVEL_OUTOF10: 10
PAINLEVEL_OUTOF10: 5

## 2025-03-30 ASSESSMENT — PAIN DESCRIPTION - ORIENTATION
ORIENTATION: RIGHT;LEFT
ORIENTATION: RIGHT
ORIENTATION: RIGHT;LEFT
ORIENTATION: RIGHT;LEFT

## 2025-03-30 ASSESSMENT — PAIN DESCRIPTION - LOCATION
LOCATION: HIP;LEG;BACK
LOCATION: HIP;LEG;BACK
LOCATION: LEG
LOCATION: GENERALIZED

## 2025-03-30 ASSESSMENT — PAIN - FUNCTIONAL ASSESSMENT
PAIN_FUNCTIONAL_ASSESSMENT: 0-10
PAIN_FUNCTIONAL_ASSESSMENT: ACTIVITIES ARE NOT PREVENTED
PAIN_FUNCTIONAL_ASSESSMENT: PREVENTS OR INTERFERES SOME ACTIVE ACTIVITIES AND ADLS
PAIN_FUNCTIONAL_ASSESSMENT: ACTIVITIES ARE NOT PREVENTED
PAIN_FUNCTIONAL_ASSESSMENT: PREVENTS OR INTERFERES SOME ACTIVE ACTIVITIES AND ADLS

## 2025-03-30 ASSESSMENT — PAIN DESCRIPTION - PAIN TYPE
TYPE: ACUTE PAIN
TYPE: CHRONIC PAIN
TYPE: CHRONIC PAIN

## 2025-03-30 ASSESSMENT — LIFESTYLE VARIABLES
HOW MANY STANDARD DRINKS CONTAINING ALCOHOL DO YOU HAVE ON A TYPICAL DAY: 1 OR 2
HOW OFTEN DO YOU HAVE A DRINK CONTAINING ALCOHOL: MONTHLY OR LESS

## 2025-03-30 ASSESSMENT — PAIN DESCRIPTION - DIRECTION: RADIATING_TOWARDS: NO

## 2025-03-30 NOTE — ED NOTES
Patient Name: Veronica Vargas  : 1960 64 y.o.  MRN: [id number]  ED Room #: B20/B20-20     Chief complaint:   Chief Complaint   Patient presents with    Shortness of Breath    Leg Pain     Hospital Problem/Diagnosis:   Hospital Problems           Last Modified POA    * (Principal) Lung mass 3/30/2025 Yes    Acute hypoxic respiratory failure (HCC) 3/30/2025 Yes         O2 Flow Rate:O2 Device: Nasal cannula O2 Flow Rate (L/min): 2 L/min (if applicable)  Cardiac Rhythm:  NSR  Active LDA's:   Peripheral IV 25 Right Antecubital (Active)            How does patient ambulate? Unknown, did not assess in the Emergency Department, states she is \"feeling weak\". Typically uses walker at home.     2. How does patient take pills? Whole with Water    3. Is patient alert? Alert    4. Is patient oriented? To Person, To Place, To Time, and To Situation    5.   Patient arrived from:  home    6. If patient is disoriented or from a Skill Nursing Facility has family been notified of admission?  N/A, but family aware of admission.     7. Patient belongings? Belongings: Cell Phone and Clothing    Disposition of belongings? Kept with Patient     8. Any specific patient or family belongings/needs/dynamics?   a. Pt came in with respiratory distress. Typically not on o2, requiring 2L o2 here.   B. CT chest showed cancer.   C. Hx of CHF, diabetes, and ESRD (not on dialysis).     9. Miscellaneous comments/pending orders?  a.      If there are any additional questions please reach out to the Emergency Department.       Atiya Antonio, RN  [phone]

## 2025-03-30 NOTE — PROGRESS NOTES
03/30/25 1853   RT Protocol   History Pulmonary Disease 1   Respiratory pattern 0   Breath sounds 4   Cough 1   Indications for Bronchodilator Therapy On home bronchodilators   Bronchodilator Assessment Score 6

## 2025-03-30 NOTE — PROGRESS NOTES
4 Eyes Admission Assessment     I agree as the admission nurse that 2 RN's have performed a thorough Head to Toe Skin Assessment on the patient. ALL assessment sites listed below have been assessed on admission.       Areas assessed by both nurses: Palak &   [x]   Head, Face, and Ears   [x]   Shoulders, Back, and Chest  [x]   Arms, Elbows, and Hands   [x]   Coccyx, Sacrum, and Ischum  [x]   Legs, Feet, and Heels        Does the Patient have Skin Breakdown?  No         Luis Fernando Prevention initiated:  No   Wound Care Orders initiated:  No      [facility] nurse consulted for Pressure Injury (Stage 3,4, Unstageable, DTI, NWPT, and Complex wounds):  No      Nurse 1 eSignature: Electronically signed by Palak Jaquez RN on 3/30/25 at 5:54 PM EDT    **SHARE this note so that the co-signing nurse is able to place an eSignature**    Nurse 2 eSignature: {Esignature:[id number]}

## 2025-03-30 NOTE — PROGRESS NOTES
Clinical Pharmacy Consult Note  Medication History     Admit Date: 3/30/2025    List of current medications patient is taking is complete. Home Medication list in Epic updated to reflect changes noted below.    Source of information: Patient & Outpatient Dispense Report    [facility]'s home [facility]: [id number] ([phone]) & [id number] ([phone])    Changes made to medication list:   Medications removed:  Albuterol HFA - duplicate  Ferosul - d/c d/t Constipation x 2 weeks  Ferrous Gluconate - d/c d/t Constipation x 2 weeks  Hydrocodone-APAP - filled but never taken  PEG - does not help pt with constipation  Semaglutide  Varenicline - helped pt quit, no longer taking  Vit D3 - started MVI instead  Medications added:   APAP 650 m tabs PO BID  Diclofenac 1% Gel: 4 g AAA 4x/day PRN Arthritis Pain in Joints  Docusate 100 m cap PO BID  MVI: 1 tab PO daily  Medication doses adjusted:   Amlodipine 5 mg ? 10 mg  Famotidine: 20 mg (1 tab PO daily) ? 40 mg (1 tab PO BID PRN Acid Reflux  Gabapentin 300 mg ? 400 mg  Nystatin: AAA BID ? AAA BID PRN redness under breasts  Oxy-APAP: 1 tab PO Q4h PRN Pain ? 1 tab PO Q6h PRN Pain  Other notes:   Pt is good historian  Drug Allergies Updated:  Removed Percocet Allergy (itching) with pt's permission  Pt is currently taking Percocet and tolerating it well  Dicumarin: Hives, Medium, Allergy (warfarin-related compound)  Warfarin and Related: Hives, Medium, Allergy  Ibuprofen: Avoid d/t CKD, Low, Unspecified Type    Current Outpatient Medications   Medication Instructions    acetaminophen (TYLENOL) 1,300 mg, Oral, 2 times daily    albuterol sulfate HFA (PROVENTIL HFA) 108 (90 Base) MCG/ACT inhaler 2 puffs, Inhalation, EVERY 4 HOURS PRN, Space out to every 6 hours as symptoms improve.    amLODIPine (NORVASC) 10 mg, Oral, DAILY    atorvastatin (LIPITOR) 40 mg, Oral, NIGHTLY    carvedilol (COREG) 12.5 mg, Oral, 2 TIMES DAILY WITH MEALS    diclofenac sodium (VOLTAREN) 4  g, Topical, 4 TIMES DAILY PRN    docusate sodium (COLACE) 100 mg, Oral, 2 TIMES DAILY    dulaglutide (TRULICITY) 0.75 MG/0.5ML SOPN SC injection Inject 0.5 mLs into the skin once a week (Thursdays)    famotidine (PEPCID) 40 mg, Oral, 2 TIMES DAILY PRN    isosorbide mononitrate (IMDUR) 30 mg, Oral, DAILY    loratadine (CLARITIN) 10 MG tablet TAKE 1 TABLET BY MOUTH EVERY DAY AS NEEDED FOR ALLERGIES    Multiple Vitamins-Minerals (CENTRUM SILVER 50+WOMEN) TABS 1 tablet, Oral, DAILY    nystatin (MYCOSTATIN) 745304 UNIT/GM cream Apply topically 2 times daily.    oxyCODONE-acetaminophen (PERCOCET) 7.5-325 MG per tablet 1 tablet, Oral, EVERY 6 HOURS PRN    tiZANidine (ZANAFLEX) 4 mg, Oral, EVERY 8 HOURS PRN       Thank you for consulting pharmacy,    Manuel Parker  PharmD Candidate [id number]  [phone]  03/30/25, 3:57 PM

## 2025-03-30 NOTE — RT PROTOCOL NOTE
RT Inhaler-Nebulizer Bronchodilator Protocol Note    There is a bronchodilator order in the chart from a provider indicating to follow the RT Bronchodilator Protocol and there is an “Initiate RT Inhaler-Nebulizer Bronchodilator Protocol” order as well (see protocol at bottom of note).    CXR Findings:  XR CHEST PORTABLE  Result Date: 3/30/2025  Suboptimal exam. No definite consolidation. Electronically signed by Amos Hutton MD      The findings from the last RT Protocol Assessment were as follows:   History Pulmonary Disease: Smoker 15 pack years or more  Respiratory Pattern: Regular pattern and RR 12-20 bpm  Breath Sounds: Intermittent or unilateral wheezes  Cough: Strong, productive  Indication for Bronchodilator Therapy: On home bronchodilators  Bronchodilator Assessment Score: 6    Aerosolized bronchodilator medication orders have been revised according to the RT Inhaler-Nebulizer Bronchodilator Protocol below.    Respiratory Therapist to perform RT Therapy Protocol Assessment initially then follow the protocol.  Repeat RT Therapy Protocol Assessment PRN for score 0-3 or on second treatment, BID, and PRN for scores above 3.    No Indications - adjust the frequency to every 6 hours PRN wheezing or bronchospasm, if no treatments needed after 48 hours then discontinue using Per Protocol order mode.     If indication present, adjust the RT bronchodilator orders based on the Bronchodilator Assessment Score as indicated below.  Use Inhaler orders unless patient has one or more of the following: on home nebulizer, not able to hold breath for 10 seconds, is not alert and oriented, cannot activate and use MDI correctly, or respiratory rate 25 breaths per minute or more, then use the equivalent nebulizer order(s) with same Frequency and PRN reasons based on the score.  If a patient is on this medication at home then do not decrease Frequency below that used at home.    0-3 - enter or revise RT bronchodilator order(s) to

## 2025-03-30 NOTE — H&P
Internal Medicine H&P    Date:   3/30/2025   Patient:  Veronica Vargas   :   1960     CC:  Shortness of Breath and Leg Pain       Source of HPI: Patient    Subjective     HPI:   Ms. Veronica Vargas is a 64 y.o. female with a MHx significant for  chronic diastolic CHF, HTN, HLD, pAfib (not on AC due H/O GI bleed), T2DM, CKD IV, who presented to the ED with SOB and Back pain.     The patient presenting with a 3-month history of progressive shortness of breath (SOB) with dyspnea on exertion requiring rest after walking short distances. She has a history of CHF but denies a COPD diagnosis and is not using supplemental oxygen at baseline.    In January, she fell and has been using a wheelchair for ambulation since that time. Over the past 2 days, her SOB has become more persistent and has been accompanied by wheezing.  She denies chest pain, cough, palpitations, orthopnea, or leg swelling. Additionally, the patient reports night sweats and weight loss of 24 pounds over the last 2 months, accompanied by a decreased appetite. No fever or chills were noted. She reported back pain for the same duration that is severe awakening her from sleep and have radiation to the right leg.     The patient has an extensive smoking history of 1 to 1.5 pack/day for 50 years, though she quit smoking one month ago. Denies alcohol or substance use.    ED Course:  On arrival to the ED, vital signs /91 HR 75 RR 18 SpO2 79 on room air then 94 on 3 L  Labs were significant for:  VBG 7.34 PCO2 43 pO2 52 HCO3 23.7   (4 months ago 1900)  Troponin 13 and 12  CBC showing hemoglobin 9.8   BMP BUN 24 creatinine 1.8 (Similar to her baseline)  Imaging   - CXR suboptimal examination no definite consultation  - CT chest:   1. No evidence of pulmonary embolism.  2. Findings indicating extensive thoracic metastatic disease versus lymphoma.  3. Confluent masses, nodules and infiltration throughout the anterior mediastinum.  4.  Avoid Nephrotoxic meds  - Monitor I/Os strictly      # Chronic Normocytic Anemia:   Reports chronic melena and taking Iron supplements.   2/2 to Obscure GI bleeding (EGD and colonoscopy and capsule was negative) - Hgb on admission 9.8   - CBC daily  - Iron studies ordered      # T2DM   Home regimen   Last HbA1c (12/5/2024) 6.7  - Medium dose sliding scale  - POCT glucose x 4  - Hypoglycemia protocol      # Hypertension - continue amlodipine tablet 10 mg daily  # Hyperlipidemia -  continue statin 40 mg nightly      # PAfib:   Sinus rhythm on EKG. [id number] score 5 . Not on anticoagulation due to GI bleeding and chronic anemia. She was amiodarone for AF d/c on 2/2025  - Continue home Coreg 12.5 mg twice daily   - Continuous telemetry    # Obesity   - BMI 37.61        DVT PPx:    Diet:  No diet orders on file   Code status:  Prior     ELOS:  Barriers to discharge:  Disposition  - Preadmission:  - Current:  - Upon discharge:  TBD      Will discuss with attending physician Dr. Retana, Ayan DE LA CRUZ, *     Luther Horvath MD, PGY-1  Internal Medicine Resident  Contact via [employer]

## 2025-03-30 NOTE — ED TRIAGE NOTES
Patient presents to the ED from home with c/o shortness of breath for the last two days, as well as right leg pain from a fall one month ago.  EKG obtained.  Patient placed on cardiac monitor.  MD at bedside.

## 2025-03-30 NOTE — ED PROVIDER NOTES
ED Attending Attestation Note     Date of evaluation: 3/30/2025    This patient was seen by the resident.  I have seen and examined the patient, agree with the workup, evaluation, management and diagnosis. The care plan has been discussed.  I have reviewed the ECG and concur with the resident's interpretation.  My assessment reveals a 64-year-old female with past medical history of venous insufficiency, pneumonia, CKD, dilated cardiomyopathy, DKA, sepsis, presents the emergency department for ex.  To have concern for lung disease like COPD as a cause of her shortness of breath and also concern for possible pulmonary embolism and/or CHF.  Advanced imaging was performed.  There was signs of significant thoracic metastatic disease.  There is also pericardial effusion present.  There did not appear to be signs of tamponade on POCUS.  Her EKG by my interpretation shows a normal sinus rhythm with normal axis, no signs of acute ST elevations or depressions.  On my exam the patient does appear chronically ill.  Heart rate regular rate, pulses intact, mentating well.  She has no abdominal tenderness.  Lungs have some wheezing.  She does continue to remain hypoxic.  She is requiring oxygen.  With oxygen requirement and the newfound metastatic disease she will be admitted to the hospitalist for further evaluation and management       Ronnell Koenig MD  03/30/25 2124

## 2025-03-31 ENCOUNTER — ANESTHESIA (OUTPATIENT)
Dept: OPERATING ROOM | Age: 65
DRG: 823 | End: 2025-03-31
Payer: MEDICARE

## 2025-03-31 ENCOUNTER — APPOINTMENT (OUTPATIENT)
Age: 65
DRG: 823 | End: 2025-03-31
Payer: MEDICARE

## 2025-03-31 ENCOUNTER — ANESTHESIA EVENT (OUTPATIENT)
Dept: OPERATING ROOM | Age: 65
DRG: 823 | End: 2025-03-31
Payer: MEDICARE

## 2025-03-31 PROBLEM — C79.51 METASTASIS TO BONE (HCC): Status: ACTIVE | Noted: 2025-03-31

## 2025-03-31 PROBLEM — M54.17 LUMBOSACRAL RADICULOPATHY: Status: ACTIVE | Noted: 2025-03-31

## 2025-03-31 LAB
ABO/RH: NORMAL
ALBUMIN SERPL-MCNC: 4 G/DL (ref 3.4–5)
ANION GAP SERPL CALCULATED.3IONS-SCNC: 12 MMOL/L (ref 3–16)
ANTIBODY SCREEN: NORMAL
BASOPHILS # BLD: 0.1 K/UL (ref 0–0.2)
BASOPHILS NFR BLD: 0.5 %
BUN SERPL-MCNC: 24 MG/DL (ref 7–20)
CALCIUM SERPL-MCNC: 9.3 MG/DL (ref 8.3–10.6)
CHLORIDE SERPL-SCNC: 106 MMOL/L (ref 99–110)
CO2 SERPL-SCNC: 23 MMOL/L (ref 21–32)
CREAT SERPL-MCNC: 1.7 MG/DL (ref 0.6–1.2)
DEPRECATED RDW RBC AUTO: 17.4 % (ref 12.4–15.4)
ECHO AO ASC DIAM: 3.3 CM
ECHO AO ASCENDING AORTA INDEX: 1.59 CM/M2
ECHO AO ROOT DIAM: 3.6 CM
ECHO AO ROOT INDEX: 1.73 CM/M2
ECHO AV AREA PEAK VELOCITY: 2.8 CM2
ECHO AV AREA VTI: 2.6 CM2
ECHO AV AREA/BSA PEAK VELOCITY: 1.3 CM2/M2
ECHO AV AREA/BSA VTI: 1.3 CM2/M2
ECHO AV MEAN GRADIENT: 3 MMHG
ECHO AV MEAN VELOCITY: 0.8 M/S
ECHO AV PEAK GRADIENT: 5 MMHG
ECHO AV PEAK VELOCITY: 1.1 M/S
ECHO AV VELOCITY RATIO: 1
ECHO AV VTI: 22.7 CM
ECHO BSA: 2.17 M2
ECHO IVC PROX: 1.6 CM
ECHO LA DIAMETER INDEX: 1.68 CM/M2
ECHO LA DIAMETER: 3.5 CM
ECHO LA TO AORTIC ROOT RATIO: 0.97
ECHO LV E' LATERAL VELOCITY: 8.7 CM/S
ECHO LV E' SEPTAL VELOCITY: 7.83 CM/S
ECHO LV EDV A2C: 127 ML
ECHO LV EDV A4C: 93 ML
ECHO LV EDV INDEX A4C: 45 ML/M2
ECHO LV EDV NDEX A2C: 61 ML/M2
ECHO LV EF PHYSICIAN: 55 %
ECHO LV EJECTION FRACTION A2C: 64 %
ECHO LV EJECTION FRACTION A4C: 57 %
ECHO LV EJECTION FRACTION BIPLANE: 60 % (ref 55–100)
ECHO LV ESV A2C: 46 ML
ECHO LV ESV A4C: 41 ML
ECHO LV ESV INDEX A2C: 22 ML/M2
ECHO LV ESV INDEX A4C: 20 ML/M2
ECHO LV FRACTIONAL SHORTENING: 35 % (ref 28–44)
ECHO LV INTERNAL DIMENSION DIASTOLE INDEX: 1.92 CM/M2
ECHO LV INTERNAL DIMENSION DIASTOLIC: 4 CM (ref 3.9–5.3)
ECHO LV INTERNAL DIMENSION SYSTOLIC INDEX: 1.25 CM/M2
ECHO LV INTERNAL DIMENSION SYSTOLIC: 2.6 CM
ECHO LV IVSD: 1.7 CM (ref 0.6–0.9)
ECHO LV MASS 2D: 271 G (ref 67–162)
ECHO LV MASS INDEX 2D: 130.3 G/M2 (ref 43–95)
ECHO LV POSTERIOR WALL DIASTOLIC: 1.6 CM (ref 0.6–0.9)
ECHO LV RELATIVE WALL THICKNESS RATIO: 0.8
ECHO LVOT AREA: 2.8 CM2
ECHO LVOT AV VTI INDEX: 0.9
ECHO LVOT DIAM: 1.9 CM
ECHO LVOT MEAN GRADIENT: 3 MMHG
ECHO LVOT PEAK GRADIENT: 5 MMHG
ECHO LVOT PEAK VELOCITY: 1.1 M/S
ECHO LVOT STROKE VOLUME INDEX: 27.9 ML/M2
ECHO LVOT SV: 58.1 ML
ECHO LVOT VTI: 20.5 CM
ECHO MV A VELOCITY: 1.08 M/S
ECHO MV E DECELERATION TIME (DT): 211 MS
ECHO MV E VELOCITY: 0.69 M/S
ECHO MV E/A RATIO: 0.64
ECHO MV E/E' LATERAL: 7.93
ECHO MV E/E' RATIO (AVERAGED): 8.37
ECHO MV E/E' SEPTAL: 8.81
ECHO PV MAX VELOCITY: 1 M/S
ECHO PV MEAN GRADIENT: 2 MMHG
ECHO PV MEAN VELOCITY: 0.7 M/S
ECHO PV PEAK GRADIENT: 4 MMHG
ECHO PV VTI: 19.1 CM
ECHO RV FREE WALL PEAK S': 15.4 CM/S
ECHO RV TAPSE: 1.7 CM (ref 1.7–?)
EOSINOPHIL # BLD: 0 K/UL (ref 0–0.6)
EOSINOPHIL NFR BLD: 0 %
GFR SERPLBLD CREATININE-BSD FMLA CKD-EPI: 33 ML/MIN/{1.73_M2}
GLUCOSE BLD-MCNC: 218 MG/DL (ref 70–99)
GLUCOSE BLD-MCNC: 243 MG/DL (ref 70–99)
GLUCOSE BLD-MCNC: 244 MG/DL (ref 70–99)
GLUCOSE BLD-MCNC: 245 MG/DL (ref 70–99)
GLUCOSE SERPL-MCNC: 247 MG/DL (ref 70–99)
HCT VFR BLD AUTO: 29.1 % (ref 36–48)
HGB BLD-MCNC: 9.4 G/DL (ref 12–16)
INR PPP: 1.15 (ref 0.85–1.15)
IRON SATN MFR SERPL: 16 % (ref 15–50)
IRON SERPL-MCNC: 27 UG/DL (ref 37–145)
LYMPHOCYTES # BLD: 0.8 K/UL (ref 1–5.1)
LYMPHOCYTES NFR BLD: 6.6 %
MAGNESIUM SERPL-MCNC: 2.53 MG/DL (ref 1.8–2.4)
MCH RBC QN AUTO: 28.6 PG (ref 26–34)
MCHC RBC AUTO-ENTMCNC: 32.5 G/DL (ref 31–36)
MCV RBC AUTO: 87.9 FL (ref 80–100)
MONOCYTES # BLD: 0.4 K/UL (ref 0–1.3)
MONOCYTES NFR BLD: 3.3 %
NEUTROPHILS # BLD: 10.3 K/UL (ref 1.7–7.7)
NEUTROPHILS NFR BLD: 89.6 %
PERFORMED ON: ABNORMAL
PHOSPHATE SERPL-MCNC: 3.7 MG/DL (ref 2.5–4.9)
PLATELET # BLD AUTO: 267 K/UL (ref 135–450)
PMV BLD AUTO: 8.1 FL (ref 5–10.5)
POTASSIUM SERPL-SCNC: 4.9 MMOL/L (ref 3.5–5.1)
POTASSIUM SERPL-SCNC: 5.1 MMOL/L (ref 3.5–5.1)
PROTHROMBIN TIME: 14.9 SEC (ref 11.9–14.9)
RBC # BLD AUTO: 3.31 M/UL (ref 4–5.2)
SODIUM SERPL-SCNC: 141 MMOL/L (ref 136–145)
TIBC SERPL-MCNC: 167 UG/DL (ref 260–445)
WBC # BLD AUTO: 11.5 K/UL (ref 4–11)

## 2025-03-31 PROCEDURE — 86900 BLOOD TYPING SEROLOGIC ABO: CPT

## 2025-03-31 PROCEDURE — 85610 PROTHROMBIN TIME: CPT

## 2025-03-31 PROCEDURE — 6370000000 HC RX 637 (ALT 250 FOR IP)

## 2025-03-31 PROCEDURE — 99223 1ST HOSP IP/OBS HIGH 75: CPT | Performed by: SURGERY

## 2025-03-31 PROCEDURE — 36415 COLL VENOUS BLD VENIPUNCTURE: CPT

## 2025-03-31 PROCEDURE — 6360000004 HC RX CONTRAST MEDICATION

## 2025-03-31 PROCEDURE — 80069 RENAL FUNCTION PANEL: CPT

## 2025-03-31 PROCEDURE — 99223 1ST HOSP IP/OBS HIGH 75: CPT | Performed by: INTERNAL MEDICINE

## 2025-03-31 PROCEDURE — C8929 TTE W OR WO FOL WCON,DOPPLER: HCPCS

## 2025-03-31 PROCEDURE — 85025 COMPLETE CBC W/AUTO DIFF WBC: CPT

## 2025-03-31 PROCEDURE — 93306 TTE W/DOPPLER COMPLETE: CPT | Performed by: INTERNAL MEDICINE

## 2025-03-31 PROCEDURE — 84132 ASSAY OF SERUM POTASSIUM: CPT

## 2025-03-31 PROCEDURE — 2700000000 HC OXYGEN THERAPY PER DAY

## 2025-03-31 PROCEDURE — 83735 ASSAY OF MAGNESIUM: CPT

## 2025-03-31 PROCEDURE — 86850 RBC ANTIBODY SCREEN: CPT

## 2025-03-31 PROCEDURE — 94640 AIRWAY INHALATION TREATMENT: CPT

## 2025-03-31 PROCEDURE — 6360000002 HC RX W HCPCS: Performed by: INTERNAL MEDICINE

## 2025-03-31 PROCEDURE — 1200000000 HC SEMI PRIVATE

## 2025-03-31 PROCEDURE — 2500000003 HC RX 250 WO HCPCS

## 2025-03-31 PROCEDURE — 94761 N-INVAS EAR/PLS OXIMETRY MLT: CPT

## 2025-03-31 PROCEDURE — 86901 BLOOD TYPING SEROLOGIC RH(D): CPT

## 2025-03-31 PROCEDURE — 6370000000 HC RX 637 (ALT 250 FOR IP): Performed by: NURSE PRACTITIONER

## 2025-03-31 RX ORDER — OXYCODONE HYDROCHLORIDE 5 MG/1
2.5 TABLET ORAL
Refills: 0 | Status: DISCONTINUED | OUTPATIENT
Start: 2025-03-31 | End: 2025-04-02 | Stop reason: HOSPADM

## 2025-03-31 RX ORDER — FAMOTIDINE 20 MG/1
20 TABLET, FILM COATED ORAL 2 TIMES DAILY PRN
Status: DISCONTINUED | OUTPATIENT
Start: 2025-03-31 | End: 2025-03-31

## 2025-03-31 RX ORDER — FAMOTIDINE 20 MG/1
20 TABLET, FILM COATED ORAL DAILY PRN
Status: DISCONTINUED | OUTPATIENT
Start: 2025-03-31 | End: 2025-04-02 | Stop reason: HOSPADM

## 2025-03-31 RX ADMIN — PREDNISONE 40 MG: 20 TABLET ORAL at 09:01

## 2025-03-31 RX ADMIN — INSULIN LISPRO 2 UNITS: 100 INJECTION, SOLUTION INTRAVENOUS; SUBCUTANEOUS at 12:32

## 2025-03-31 RX ADMIN — SODIUM CHLORIDE, PRESERVATIVE FREE 10 ML: 5 INJECTION INTRAVENOUS at 09:07

## 2025-03-31 RX ADMIN — ISOSORBIDE MONONITRATE 60 MG: 60 TABLET, EXTENDED RELEASE ORAL at 09:01

## 2025-03-31 RX ADMIN — INSULIN LISPRO 2 UNITS: 100 INJECTION, SOLUTION INTRAVENOUS; SUBCUTANEOUS at 17:41

## 2025-03-31 RX ADMIN — OXYCODONE HYDROCHLORIDE AND ACETAMINOPHEN 1 TABLET: 7.5; 325 TABLET ORAL at 09:12

## 2025-03-31 RX ADMIN — DOCUSATE SODIUM 100 MG: 100 CAPSULE, LIQUID FILLED ORAL at 09:00

## 2025-03-31 RX ADMIN — INSULIN LISPRO 2 UNITS: 100 INJECTION, SOLUTION INTRAVENOUS; SUBCUTANEOUS at 09:06

## 2025-03-31 RX ADMIN — SODIUM CHLORIDE, PRESERVATIVE FREE 10 ML: 5 INJECTION INTRAVENOUS at 23:16

## 2025-03-31 RX ADMIN — ALBUTEROL SULFATE 2.5 MG: 2.5 SOLUTION RESPIRATORY (INHALATION) at 14:25

## 2025-03-31 RX ADMIN — OXYCODONE HYDROCHLORIDE 2.5 MG: 5 TABLET ORAL at 23:15

## 2025-03-31 RX ADMIN — SULFUR HEXAFLUORIDE 2 ML: 60.7; .19; .19 INJECTION, POWDER, LYOPHILIZED, FOR SUSPENSION INTRAVENOUS; INTRAVESICAL at 10:04

## 2025-03-31 RX ADMIN — ALBUTEROL SULFATE 2.5 MG: 2.5 SOLUTION RESPIRATORY (INHALATION) at 21:21

## 2025-03-31 RX ADMIN — ATORVASTATIN CALCIUM 40 MG: 40 TABLET, FILM COATED ORAL at 22:57

## 2025-03-31 RX ADMIN — ALBUTEROL SULFATE 2.5 MG: 2.5 SOLUTION RESPIRATORY (INHALATION) at 08:34

## 2025-03-31 RX ADMIN — AMLODIPINE BESYLATE 10 MG: 10 TABLET ORAL at 08:59

## 2025-03-31 RX ADMIN — INSULIN LISPRO 2 UNITS: 100 INJECTION, SOLUTION INTRAVENOUS; SUBCUTANEOUS at 22:58

## 2025-03-31 RX ADMIN — DOCUSATE SODIUM 100 MG: 100 CAPSULE, LIQUID FILLED ORAL at 22:57

## 2025-03-31 RX ADMIN — OXYCODONE HYDROCHLORIDE AND ACETAMINOPHEN 1 TABLET: 7.5; 325 TABLET ORAL at 18:12

## 2025-03-31 ASSESSMENT — PAIN - FUNCTIONAL ASSESSMENT: PAIN_FUNCTIONAL_ASSESSMENT: ACTIVITIES ARE NOT PREVENTED

## 2025-03-31 ASSESSMENT — PAIN DESCRIPTION - FREQUENCY: FREQUENCY: CONTINUOUS

## 2025-03-31 ASSESSMENT — PAIN DESCRIPTION - LOCATION
LOCATION: HEAD
LOCATION: GENERALIZED

## 2025-03-31 ASSESSMENT — PAIN DESCRIPTION - DESCRIPTORS
DESCRIPTORS: ACHING;DISCOMFORT
DESCRIPTORS: ACHING

## 2025-03-31 ASSESSMENT — PAIN SCALES - WONG BAKER
WONGBAKER_NUMERICALRESPONSE: NO HURT

## 2025-03-31 ASSESSMENT — PAIN SCALES - GENERAL
PAINLEVEL_OUTOF10: 7
PAINLEVEL_OUTOF10: 7
PAINLEVEL_OUTOF10: 10
PAINLEVEL_OUTOF10: 5
PAINLEVEL_OUTOF10: 10
PAINLEVEL_OUTOF10: 7
PAINLEVEL_OUTOF10: 10

## 2025-03-31 ASSESSMENT — PAIN DESCRIPTION - ORIENTATION: ORIENTATION: MID

## 2025-03-31 ASSESSMENT — PAIN DESCRIPTION - ONSET: ONSET: ON-GOING

## 2025-03-31 ASSESSMENT — PAIN DESCRIPTION - PAIN TYPE: TYPE: CHRONIC PAIN

## 2025-03-31 NOTE — CONSULTS
non-tender, non-distended, no rebound, guarding, or rigidity.  Skin: warm and dry, no rashes  Extremities: Bilateral axilla with multiple healed scars from recurrent hidradenitis, nodularity appreciated L>R  Genitourinary:   Neuro: A&Ox3, no focal deficits, sensation intact    Labs:    CBC:   Recent Labs     03/30/25  1210 03/31/25  0606   WBC 10.8 11.5*   HGB 9.8* 9.4*   HCT 29.2* 29.1*   MCV 86.5 87.9    267     BMP:   Recent Labs     03/30/25  1210 03/31/25  0606    141   K 4.5 5.1    106   CO2 21 23   PHOS  --  3.7   BUN 24* 24*   CREATININE 1.8* 1.7*     PT/INR: No results for input(s): \"PROTIME\", \"INR\" in the last 72 hours.  APTT: No results for input(s): \"APTT\" in the last 72 hours.  Liver Profile:   Lab Results   Component Value Date/Time    AST 11 01/27/2023 10:26 AM    ALT 10 01/27/2023 10:26 AM    BILIDIR <0.2 04/04/2019 10:51 AM    BILITOT <0.2 01/27/2023 10:26 AM    ALKPHOS 88 01/27/2023 10:26 AM    PROTEIN 7.6 01/27/2023 10:26 AM     Lab Results   Component Value Date/Time    CHOL 175 02/20/2020 05:00 AM     02/20/2020 05:00 AM    TRIG 35 02/20/2020 05:00 AM     UA:   Lab Results   Component Value Date/Time    COLORU Yellow 01/27/2023 11:38 AM    PHUR 6.0 01/27/2023 11:38 AM    LABCAST 0-1 Coarse Gran 05/20/2017 11:55 AM    WBCUA 0-2 01/27/2023 11:38 AM    RBCUA 3-4 01/27/2023 11:38 AM    BACTERIA Rare 01/27/2023 11:38 AM    CLARITYU Clear 01/27/2023 11:38 AM    LEUKOCYTESUR Negative 01/27/2023 11:38 AM    UROBILINOGEN 0.2 01/27/2023 11:38 AM    BILIRUBINUR Negative 01/27/2023 11:38 AM    BLOODU TRACE-INTACT 01/27/2023 11:38 AM    GLUCOSEU Negative 01/27/2023 11:38 AM    AMORPHOUS 3+ 06/01/2016 11:50 AM       Imaging:   CT ABDOMEN PELVIS WO CONTRAST Additional Contrast? None   Final Result      1.  Osteolytic lesions in the pelvis compatible with metastatic disease.   2.  Indeterminate 2 cm left adrenal nodule may relate to primary adrenal lesion such as adenoma or metastasis.       Electronically signed by Hemanth Brandon MD      CTA Chest W/ Contrast R/O PE   Final Result      1.  No evidence of pulmonary embolism.   2.  Findings indicating extensive thoracic metastatic disease versus lymphoma.   3.  Confluent masses, nodules and infiltration throughout the anterior mediastinum.   4.  Paratracheal, subcarinal and bilateral hilar lymphadenopathy. Extensive left axillary/pectoral lymphadenopathy.   5.  Dominant left upper lobe mass.   6.  Bilateral interlobular septal thickening in the lungs is relatively smooth and therefore may favor lymphatic congestion/interstitial edema. Neoplastic lymphangitic infiltration is not excluded.   7.  Moderate pericardial effusion.   8.  Small left pleural effusion and passive atelectasis.   9.  Osteolytic vertebral lesions at T10.         ----------PERT DATA----------      Data reported only if pulmonary embolism is present:      Most central extent of clot:   NA      RV/LV ratio:   NA      ----------PERT DATA----------      Electronically signed by Amos Hutton MD      XR CHEST PORTABLE   Final Result      Suboptimal exam. No definite consolidation.      Electronically signed by Amos Hutton MD            Assessment/Plan:  This is a 64 y.o. female with Hx listed above presents to [facility] with worsening shortness of breath and was found to have a new dominant centrilobular/irregular mass within the anterior left upper lobe with associated mediastinal, hilar and axillary lymphadenopathy concerning for metastatic disease. General surgery consulted for left axillary lymph node biopsy.     - Patient has been NPO since midnight, continue NPO  - Will plan for left axillary lymph node biopsy for this evening  - Ancef on call to OR  - Chest CT reviewed  - EKG and Echo reviewed  - Anesthesia to see patient  - Consent obtained and placed in the patient's chart.  - Patient discussed with Dr. Campa who agrees with the above plans.     Eleanor Chen DO  03/31/25  12:24 PM

## 2025-03-31 NOTE — CARE COORDINATION
Case Management Assessment  Initial Evaluation    Date/Time of Evaluation: 3/31/2025 4:06 PM  Assessment Completed by: Sussy France    If patient is discharged prior to next notation, then this note serves as note for discharge by case management.    Patient Name: Veronica Vargas                   YOB: 1960  Diagnosis: Lung mass [R91.8]  Acute hypoxic respiratory failure [J96.01]                   Date / Time: 3/30/2025 11:37 AM    Patient Admission Status: Inpatient   Readmission Risk (Low < 19, Mod (19-27), High > 27): Readmission Risk Score: 18    Current PCP: Ayan Fong MD  PCP verified by CM? Yes    Chart Reviewed: Yes      History Provided by: Patient  Patient Orientation: Alert and Oriented    Patient Cognition: Alert    Hospitalization in the last 30 days (Readmission):  No    If yes, Readmission Assessment in  Navigator will be completed.    Advance Directives:      Code Status: Full Code   Patient's Primary Decision Maker is: Legal Next of Kin      Discharge Planning:    Patient lives with: Alone Type of Home: House  Primary Care Giver: Self  Patient Support Systems include: Family Members, Children   Current Financial resources: Medicare  Current community resources: Other (Comment) ([facility] - MOWs)  Current services prior to admission: None            Current DME:              Type of Home Care services:  OT, PT    ADLS  Prior functional level: Independent in ADLs/IADLs  Current functional level: Independent in ADLs/IADLs    PT AM-PAC:   /24  OT AM-PAC:   /24    Family can provide assistance at DC: Yes  Would you like Case Management to discuss the discharge plan with any other family members/significant others, and if so, who?    Plans to Return to Present Housing: Yes  Other Identified Issues/Barriers to RETURNING to current housing: None  Potential Assistance needed at discharge: N/A            Potential DME:    Patient expects to discharge to: House  Plan for transportation at  discharge:      Financial    Payor: [facility] [place] MEDICARE / Plan: [place] DUAL COMPLETE / Product Type: *No Product type* /     Does insurance require precert for SNF: Yes    Potential assistance Purchasing Medications:    Meds-to-Beds request:        [id number] - [place], [place] - [address] - P [phone] - F [phone]  [address]  [facility] [address]  Phone: [phone] Fax: [phone]    [id number] [place], [place] - [address] - P [phone] - F [phone] [address]  [facility] [address]  Phone: [phone] Fax: [phone]      Notes:    Factors facilitating achievement of predicted outcomes: Family support    Barriers to discharge: 4L O2 (BL is RA)Lung mass, Left axillary lymph node biopsy, possible right,  Lymphadenopathy, General surgery & Pulmonology following       Additional Case Management Notes: Patient is from home alone.  Grand daughter at bedside.  Patient is A&OX4.  IPTA.  Patient is active with PCP and COA for MOWs.  Plan is to return home.  Family to transport.      The Plan for Transition of Care is related to the following treatment goals of Lung mass [R91.8]  Acute hypoxic respiratory failure [J96.01]    IF APPLICABLE: The Patient and/or patient representative Veronica and her family were provided with a choice of provider and agrees with the discharge plan. Freedom of choice list with basic dialogue that supports the patient's individualized plan of care/goals and shares the quality data associated with the providers was provided to:     Patient Representative Name:       The Patient and/or Patient Representative Agree with the Discharge Plan?      Sussy France  Case Management Department  Ph: [phone] Fax: [phone]

## 2025-03-31 NOTE — PROGRESS NOTES
Instructed pt to start on NPO, she agreed but refused for her food to be taken away, she said she can comply as NPO even if her food is at bedside.

## 2025-03-31 NOTE — PROGRESS NOTES
Point of Care Note:  Bariatric Surgery  Veronica RAHMAN Sam    3:50 PM  3/31/2025      Will postpone surgery (left axillary lymph node biopsy) to tomorrow morning at 7:15am.     Okay for diet for the rest of the day  Patient to be NPO at midnight.     Eleanor Chen DO, MS  PGY4, General Surgery  03/31/25  3:51 PM

## 2025-03-31 NOTE — CONSULTS
CC: Abnormal chest CT scan    Veronica Vargas is a 64-year-old woman referred by Dr. Ronnell Cartagena for evaluation of abnormal chest CT scan.  She presented to the hospital with complaints of increased shortness of breath for at least a few days prior to admission, also increasing weakness over recent several weeks.  She does not describe any acute respiratory process, denies cough or chest discomfort.  No upper respiratory infection symptoms.  She has not had fevers or chills.  Her complaints of tiredness have been developing for an uncertain length of time.  She has chairs planted different places around her home so that she can stop and sit down for a rest whenever she is tired.  She got a rollator from a friend to make it easier to walk around her home.  Appetite has been down and she reports weight loss of 24 pounds in past 2 months.  She does not have nocturnal chest symptoms.  She has been treated for diastolic heart failure and chronic kidney disease, which she states have been stable.  She has not been diagnosed or treated for chronic lung disease.  She has been a cigarette smoker throughout her entire adult life, at least 1 pack/day until quitting 1 month ago.  She quit because she did not enjoy it anymore    PMH: Diastolic heart failure.  CKD.  Diabetes mellitus.  Hypertension.    Social history: Patient lives in her own home.  Smoking history of 50 pack years, quit 1 month ago.  Rarely drinks alcohol.    Physical examination reveals a pleasant, obese woman in no acute distress resting in bed.  Pulse 84, regular.  Blood pressure 174/64.  Respirations 14.  O2 saturation 95%, O2 at 4 L/min.  Temperature 97.9 °F  HEENT: Eyes ears nose normal.  No oral lesions.  Mucous membranes are moist.  Neck: No lymphadenopathy or thyromegaly.  No JVD.  Chest: Normal configuration.  Normal to percussion.  Breath sounds decreased at left base, otherwise normal.  No adventitious breath sounds.  Cardiovascular: Radial pulses 2+  bilaterally.  S1, S2 normal.  No murmur.  Abdomen: Obese.  No mass tenderness organomegaly.  Extremities: No cyanosis or edema.    Chest CT scan shows an irregular mass in the left upper lobe measuring 3.1 x 2.5 cm.  There is a large prevascular lymph node measuring 6 x 4 cm.  Large right hilar lymph node measures 3 x 4 cm.  There is a smaller subcarinal lymph node.  Left axillary lymphadenopathy fairly extensive..  There is a lytic lesion in T10 vertebral body.    A&P: This extensive disease in the chest may be consistent with a bronchogenic carcinoma in the left upper lobe with mediastinal and axillary lymphadenopathy.  Findings may also be consistent with lymphoma.  Tissue sampling could be obtained from the axilla, mediastinal lymph node or left upper lobe mass.  The greatest tissue availability, which would show lymph node architecture, would be excising a left axillary node.  Core needle aspiration biopsy could be obtained from the prevascular lymph node.  Bronchoscopy can sample right hilar and subcarinal lymphadenopathy, although sample size is likely to be much less.  That may not be as much of a problem if she has a bronchogenic carcinoma, but certainly lymphoma is in the differential, and rajni architecture is important.  Consulting general surgery for lymph node biopsy.  Discussed with medical team

## 2025-03-31 NOTE — PLAN OF CARE
Problem: Pain  Goal: Verbalizes/displays adequate comfort level or baseline comfort level  3/31/2025 0754 by Rupert Mcduffie RN  Outcome: Progressing  Flowsheets (Taken 3/31/2025 0754)  Verbalizes/displays adequate comfort level or baseline comfort level:   Encourage patient to monitor pain and request assistance   Assess pain using appropriate pain scale   Administer analgesics based on type and severity of pain and evaluate response  Note: Pain level assessed and PRN medication made available when needed for breakthrough pain. Patient stated that she was not experiencing any pain at this time. Will continue to monitor per protocol and provide support when needed.     Problem: Safety - Adult  Goal: Free from fall injury  3/31/2025 0754 by Rupert Mcduffie RN  Outcome: Progressing  Flowsheets (Taken 3/31/2025 0754)  Free From Fall Injury:   Instruct family/caregiver on patient safety   Based on caregiver fall risk screen, instruct family/caregiver to ask for assistance with transferring infant if caregiver noted to have fall risk factors  Note: Due to weakness, patient has been deemed a high fall risk. Bed alarm is engaged, bed is in lowest position, room is free of clutter and call light is within reach of patient at this time. Education provided on calling when assistance is needed. Patient verbalized understanding at this time.

## 2025-03-31 NOTE — PLAN OF CARE
Problem: Discharge Planning  Goal: Discharge to home or other facility with appropriate resources  3/30/2025 2342 by June Doshi RN  Outcome: Progressing     Problem: Chronic Conditions and Co-morbidities  Goal: Patient's chronic conditions and co-morbidity symptoms are monitored and maintained or improved  Outcome: Progressing  Flowsheets (Taken 3/30/2025 2339)  Care Plan - Patient's Chronic Conditions and Co-Morbidity Symptoms are Monitored and Maintained or Improved:   Monitor and assess patient's chronic conditions and comorbid symptoms for stability, deterioration, or improvement   Collaborate with multidisciplinary team to address chronic and comorbid conditions and prevent exacerbation or deterioration   Update acute care plan with appropriate goals if chronic or comorbid symptoms are exacerbated and prevent overall improvement and discharge     Problem: Pain  Goal: Verbalizes/displays adequate comfort level or baseline comfort level  Outcome: Progressing  Flowsheets (Taken 3/30/2025 2339)  Verbalizes/displays adequate comfort level or baseline comfort level:   Encourage patient to monitor pain and request assistance   Assess pain using appropriate pain scale   Administer analgesics based on type and severity of pain and evaluate response   Implement non-pharmacological measures as appropriate and evaluate response   Consider cultural and social influences on pain and pain management   Notify Licensed Independent Practitioner if interventions unsuccessful or patient reports new pain     Problem: Safety - Adult  Goal: Free from fall injury  Outcome: Progressing  Flowsheets (Taken 3/30/2025 2339)  Free From Fall Injury:   Instruct family/caregiver on patient safety   Based on caregiver fall risk screen, instruct family/caregiver to ask for assistance with transferring infant if caregiver noted to have fall risk factors     Problem: Metabolic/Fluid and Electrolytes - Adult  Goal: Electrolytes maintained

## 2025-03-31 NOTE — PROGRESS NOTES
POINT OF CARE NOTE  Department of Surgery      Chief Complaint or Reason for Surgery: Lymphadenopathy    Procedure: Left axillary lymph node biopsy, possible right  Expected time: 3/31/2025, evening    Plan  1. Diet: NPO effective now  2. IVF: None  3. Antibiotics: Ancef on call to the OR  4. Labs to be drawn: Labs reviewed  5. Access: R antecubital working IV  6. Anesthesia: to see patient  7. Consent: Obtained and in the patient's chart  8. Pulmonary: CT chest reviewed, no need for additional imaging  9. Cardiac: EKG and echo reviewed  10. Pregnancy test: No indicated 2/2 age, s/p tubal ligation    Eleanor Chen DO  03/31/25  1:16 PM

## 2025-03-31 NOTE — PROGRESS NOTES
Internal Medicine Progress Note    Date: 3/31/2025   Patient: Veronica RAHMAN [facility] Day: 1      CC: Shortness of Breath and Leg Pain       Interval Hx   No acute events overnight  Patient is seen on bedside. No acute distress  - No active complaint today  Denies any headache,  difficulty swallowing, cough, SOB, chest pain or tightness, fever, chill, night sweats, palpitation, ABD pain, nausea, vomiting, bowel and urinary habit changes, leg swelling,  and change in sleep and appetite.    - Vitals signs are stable /67. She is on 4 L O2.     - Plans for left axillary LN biopsy by general surgery later today     - CT abdomen (3/30):   Osteolytic lesions in the pelvis compatible with metastatic disease.  Indeterminate 2 cm left adrenal nodule may relate to primary adrenal lesion such as adenoma or metastasis.    Echo today: showed LVEF 55-60% G1DD, Small (<1 cm) pericardial effusion present.         Objective     Vital Signs:  Patient Vitals for the past 8 hrs:   BP Temp Temp src Pulse Resp SpO2 Weight   03/31/25 0733 (!) 162/67 98.1 °F (36.7 °C) Oral 84 16 94 % --   03/31/25 0428 (!) 162/70 97.8 °F (36.6 °C) Oral 84 18 97 % 102.6 kg (226 lb 3.1 oz)       Physical Exam  Constitutional:       General: She is in acute distress.   HENT:      Head: Normocephalic and atraumatic.      Right Ear: Tympanic membrane normal.      Left Ear: Tympanic membrane normal.      Nose: Nose normal.      Mouth/Throat:      Mouth: Mucous membranes are moist.      Pharynx: Oropharynx is clear.   Eyes:      General:         Right eye: No discharge.         Left eye: No discharge.      Pupils: Pupils are equal, round, and reactive to light.   Cardiovascular:      Rate and Rhythm: Normal rate and regular rhythm.      Pulses: Normal pulses.      Heart sounds: Normal heart sounds.   Pulmonary:      Effort: Pulmonary effort is normal.      Breath sounds: Wheezing present.   Abdominal:      General: Bowel sounds are normal.       adenoma or metastasis.  - Pulmonology consulted appreciate reccs    - Bronchogenic carcinoma VS lymphoma.  Bronchoscopy cannot sample the hilar and subcarinal 7 carinal LAP this will help diagnosing bronchogenic carcinoma but rajni architecture is important for lymphoma side preference to have a tissue sampling from the axillary  - General Surgery was consulted for axillary lymph node biopsy   - Oncology consulted appreciate rec   - Left axillary LN biopsy (expected 3/31)  - Duoneb  - Continue oxygen support wean off as possible, goal SPO2 greater than 88%.   - Prednisone 40 mg daily for 4 days.       # Back Pain  P/w back pain since January after her fall that is severe awakening her from sleep and have radiation to the right leg. CT chest showed osteolytic lesions at T10, CT abdomen Osteolytic lesions in the pelvis compatible with metastatic disease.Most likely 2/2 to metastasis.   - Continue to monitor  - radiation oncology consulted appreciate rec    - Pain management with tylenol and percocet         Chronic medical conditions:   # Chronic CHF  (not in excacerbation)   Last echo (2020) LV EF 35-40% Global left ventricular hypokinesis. Mild MR, mod TR. Ischemic workup was deferred due to renal insufficiency and later was refused by the patient.   - follow up with Dr. Pascual (2/2025)  - Repeat Echo (3/31/2025) showed LVEF 55-60% G1DD, Small (<1 cm) pericardial effusion present.         # CKD 4   Cr 1.8, similar to Baseline. Follow-up with Dr. Mccullough  - f/u RFP daily  - Monitor lytes replace as needed  - Avoid Nephrotoxic meds  - Monitor I/Os strictly        # Chronic Normocytic Anemia:   Reports chronic melena and taking Iron supplements.   2/2 to Obscure GI bleeding (EGD and colonoscopy and capsule was negative) - Hgb on admission 9.8   - CBC daily  - Iron studies ordered        # T2DM   Home regimen   Last HbA1c (12/5/2024) 6.7  - Medium dose sliding scale  - POCT glucose x 4  - Hypoglycemia

## 2025-04-01 ENCOUNTER — APPOINTMENT (OUTPATIENT)
Dept: MRI IMAGING | Age: 65
DRG: 823 | End: 2025-04-01
Payer: MEDICARE

## 2025-04-01 LAB
ALBUMIN SERPL-MCNC: 4.1 G/DL (ref 3.4–5)
ANION GAP SERPL CALCULATED.3IONS-SCNC: 13 MMOL/L (ref 3–16)
BASOPHILS # BLD: 0.1 K/UL (ref 0–0.2)
BASOPHILS NFR BLD: 0.8 %
BUN SERPL-MCNC: 26 MG/DL (ref 7–20)
CALCIUM SERPL-MCNC: 9.6 MG/DL (ref 8.3–10.6)
CHLORIDE SERPL-SCNC: 105 MMOL/L (ref 99–110)
CO2 SERPL-SCNC: 23 MMOL/L (ref 21–32)
CREAT SERPL-MCNC: 1.6 MG/DL (ref 0.6–1.2)
DEPRECATED RDW RBC AUTO: 17.6 % (ref 12.4–15.4)
EKG ATRIAL RATE: 83 BPM
EKG DIAGNOSIS: NORMAL
EKG P AXIS: 19 DEGREES
EKG P-R INTERVAL: 154 MS
EKG Q-T INTERVAL: 368 MS
EKG QRS DURATION: 90 MS
EKG QTC CALCULATION (BAZETT): 432 MS
EKG R AXIS: 25 DEGREES
EKG T AXIS: 107 DEGREES
EKG VENTRICULAR RATE: 83 BPM
EOSINOPHIL # BLD: 0 K/UL (ref 0–0.6)
EOSINOPHIL NFR BLD: 0 %
GFR SERPLBLD CREATININE-BSD FMLA CKD-EPI: 35 ML/MIN/{1.73_M2}
GLUCOSE BLD-MCNC: 206 MG/DL (ref 70–99)
GLUCOSE BLD-MCNC: 222 MG/DL (ref 70–99)
GLUCOSE BLD-MCNC: 225 MG/DL (ref 70–99)
GLUCOSE BLD-MCNC: 251 MG/DL (ref 70–99)
GLUCOSE BLD-MCNC: 259 MG/DL (ref 70–99)
GLUCOSE SERPL-MCNC: 205 MG/DL (ref 70–99)
HCT VFR BLD AUTO: 30.4 % (ref 36–48)
HGB BLD-MCNC: 9.7 G/DL (ref 12–16)
LYMPHOCYTES # BLD: 1 K/UL (ref 1–5.1)
LYMPHOCYTES NFR BLD: 7 %
MAGNESIUM SERPL-MCNC: 2.55 MG/DL (ref 1.8–2.4)
MCH RBC QN AUTO: 28.3 PG (ref 26–34)
MCHC RBC AUTO-ENTMCNC: 31.8 G/DL (ref 31–36)
MCV RBC AUTO: 88.8 FL (ref 80–100)
MONOCYTES # BLD: 1 K/UL (ref 0–1.3)
MONOCYTES NFR BLD: 7 %
NEUTROPHILS # BLD: 11.7 K/UL (ref 1.7–7.7)
NEUTROPHILS NFR BLD: 85.2 %
PERFORMED ON: ABNORMAL
PHOSPHATE SERPL-MCNC: 3.4 MG/DL (ref 2.5–4.9)
PLATELET # BLD AUTO: 298 K/UL (ref 135–450)
PMV BLD AUTO: 7.8 FL (ref 5–10.5)
POTASSIUM SERPL-SCNC: 4.9 MMOL/L (ref 3.5–5.1)
RBC # BLD AUTO: 3.42 M/UL (ref 4–5.2)
SODIUM SERPL-SCNC: 141 MMOL/L (ref 136–145)
WBC # BLD AUTO: 13.7 K/UL (ref 4–11)

## 2025-04-01 PROCEDURE — 80069 RENAL FUNCTION PANEL: CPT

## 2025-04-01 PROCEDURE — 2700000000 HC OXYGEN THERAPY PER DAY

## 2025-04-01 PROCEDURE — A9576 INJ PROHANCE MULTIPACK: HCPCS | Performed by: NURSE PRACTITIONER

## 2025-04-01 PROCEDURE — 2580000003 HC RX 258

## 2025-04-01 PROCEDURE — 3700000000 HC ANESTHESIA ATTENDED CARE: Performed by: SURGERY

## 2025-04-01 PROCEDURE — 36415 COLL VENOUS BLD VENIPUNCTURE: CPT

## 2025-04-01 PROCEDURE — 2500000003 HC RX 250 WO HCPCS

## 2025-04-01 PROCEDURE — 94640 AIRWAY INHALATION TREATMENT: CPT

## 2025-04-01 PROCEDURE — 6370000000 HC RX 637 (ALT 250 FOR IP)

## 2025-04-01 PROCEDURE — 6370000000 HC RX 637 (ALT 250 FOR IP): Performed by: STUDENT IN AN ORGANIZED HEALTH CARE EDUCATION/TRAINING PROGRAM

## 2025-04-01 PROCEDURE — 6360000002 HC RX W HCPCS: Performed by: ANESTHESIOLOGY

## 2025-04-01 PROCEDURE — 7100000000 HC PACU RECOVERY - FIRST 15 MIN: Performed by: SURGERY

## 2025-04-01 PROCEDURE — 88341 IMHCHEM/IMCYTCHM EA ADD ANTB: CPT

## 2025-04-01 PROCEDURE — 2709999900 HC NON-CHARGEABLE SUPPLY: Performed by: SURGERY

## 2025-04-01 PROCEDURE — 6360000002 HC RX W HCPCS: Performed by: INTERNAL MEDICINE

## 2025-04-01 PROCEDURE — 88305 TISSUE EXAM BY PATHOLOGIST: CPT

## 2025-04-01 PROCEDURE — 6360000002 HC RX W HCPCS

## 2025-04-01 PROCEDURE — 88342 IMHCHEM/IMCYTCHM 1ST ANTB: CPT

## 2025-04-01 PROCEDURE — 2500000003 HC RX 250 WO HCPCS: Performed by: SURGERY

## 2025-04-01 PROCEDURE — 3700000001 HC ADD 15 MINUTES (ANESTHESIA): Performed by: SURGERY

## 2025-04-01 PROCEDURE — 83735 ASSAY OF MAGNESIUM: CPT

## 2025-04-01 PROCEDURE — 07B60ZX EXCISION OF LEFT AXILLARY LYMPHATIC, OPEN APPROACH, DIAGNOSTIC: ICD-10-PCS | Performed by: SURGERY

## 2025-04-01 PROCEDURE — 72197 MRI PELVIS W/O & W/DYE: CPT

## 2025-04-01 PROCEDURE — 88184 FLOWCYTOMETRY/ TC 1 MARKER: CPT

## 2025-04-01 PROCEDURE — 38525 BIOPSY/REMOVAL LYMPH NODES: CPT | Performed by: SURGERY

## 2025-04-01 PROCEDURE — 6360000004 HC RX CONTRAST MEDICATION: Performed by: NURSE PRACTITIONER

## 2025-04-01 PROCEDURE — 6360000002 HC RX W HCPCS: Performed by: STUDENT IN AN ORGANIZED HEALTH CARE EDUCATION/TRAINING PROGRAM

## 2025-04-01 PROCEDURE — 77334 RADIATION TREATMENT AID(S): CPT

## 2025-04-01 PROCEDURE — 3600000014 HC SURGERY LEVEL 4 ADDTL 15MIN: Performed by: SURGERY

## 2025-04-01 PROCEDURE — 77290 THER RAD SIMULAJ FIELD CPLX: CPT

## 2025-04-01 PROCEDURE — 3600000004 HC SURGERY LEVEL 4 BASE: Performed by: SURGERY

## 2025-04-01 PROCEDURE — 6360000002 HC RX W HCPCS: Performed by: SURGERY

## 2025-04-01 PROCEDURE — 7100000001 HC PACU RECOVERY - ADDTL 15 MIN: Performed by: SURGERY

## 2025-04-01 PROCEDURE — 72157 MRI CHEST SPINE W/O & W/DYE: CPT

## 2025-04-01 PROCEDURE — 88185 FLOWCYTOMETRY/TC ADD-ON: CPT

## 2025-04-01 PROCEDURE — 1200000000 HC SEMI PRIVATE

## 2025-04-01 PROCEDURE — 85025 COMPLETE CBC W/AUTO DIFF WBC: CPT

## 2025-04-01 PROCEDURE — 94761 N-INVAS EAR/PLS OXIMETRY MLT: CPT

## 2025-04-01 RX ORDER — MAGNESIUM HYDROXIDE 1200 MG/15ML
LIQUID ORAL CONTINUOUS PRN
Status: COMPLETED | OUTPATIENT
Start: 2025-04-01 | End: 2025-04-01

## 2025-04-01 RX ORDER — SODIUM CHLORIDE 9 MG/ML
INJECTION, SOLUTION INTRAVENOUS PRN
Status: DISCONTINUED | OUTPATIENT
Start: 2025-04-01 | End: 2025-04-01 | Stop reason: HOSPADM

## 2025-04-01 RX ORDER — NALOXONE HYDROCHLORIDE 0.4 MG/ML
INJECTION, SOLUTION INTRAMUSCULAR; INTRAVENOUS; SUBCUTANEOUS PRN
Status: DISCONTINUED | OUTPATIENT
Start: 2025-04-01 | End: 2025-04-01 | Stop reason: HOSPADM

## 2025-04-01 RX ORDER — BUPIVACAINE HYDROCHLORIDE 5 MG/ML
INJECTION, SOLUTION EPIDURAL; INTRACAUDAL; PERINEURAL PRN
Status: DISCONTINUED | OUTPATIENT
Start: 2025-04-01 | End: 2025-04-01 | Stop reason: HOSPADM

## 2025-04-01 RX ORDER — ONDANSETRON 2 MG/ML
INJECTION INTRAMUSCULAR; INTRAVENOUS
Status: DISCONTINUED | OUTPATIENT
Start: 2025-04-01 | End: 2025-04-01 | Stop reason: SDUPTHER

## 2025-04-01 RX ORDER — PROPOFOL 10 MG/ML
INJECTION, EMULSION INTRAVENOUS
Status: DISCONTINUED | OUTPATIENT
Start: 2025-04-01 | End: 2025-04-01 | Stop reason: SDUPTHER

## 2025-04-01 RX ORDER — OXYCODONE HYDROCHLORIDE 5 MG/1
5 TABLET ORAL PRN
Refills: 0 | Status: DISCONTINUED | OUTPATIENT
Start: 2025-04-01 | End: 2025-04-01 | Stop reason: HOSPADM

## 2025-04-01 RX ORDER — FENTANYL CITRATE 50 UG/ML
25 INJECTION, SOLUTION INTRAMUSCULAR; INTRAVENOUS EVERY 5 MIN PRN
Refills: 0 | Status: DISCONTINUED | OUTPATIENT
Start: 2025-04-01 | End: 2025-04-01 | Stop reason: HOSPADM

## 2025-04-01 RX ORDER — SODIUM CHLORIDE 0.9 % (FLUSH) 0.9 %
5-40 SYRINGE (ML) INJECTION EVERY 12 HOURS SCHEDULED
Status: DISCONTINUED | OUTPATIENT
Start: 2025-04-01 | End: 2025-04-01 | Stop reason: HOSPADM

## 2025-04-01 RX ORDER — SODIUM CHLORIDE, SODIUM LACTATE, POTASSIUM CHLORIDE, CALCIUM CHLORIDE 600; 310; 30; 20 MG/100ML; MG/100ML; MG/100ML; MG/100ML
INJECTION, SOLUTION INTRAVENOUS CONTINUOUS
Status: DISCONTINUED | OUTPATIENT
Start: 2025-04-01 | End: 2025-04-01

## 2025-04-01 RX ORDER — KETAMINE HCL IN NACL, ISO-OSM 20 MG/2 ML
SYRINGE (ML) INJECTION
Status: DISCONTINUED | OUTPATIENT
Start: 2025-04-01 | End: 2025-04-01 | Stop reason: SDUPTHER

## 2025-04-01 RX ORDER — OXYCODONE HYDROCHLORIDE 5 MG/1
10 TABLET ORAL PRN
Refills: 0 | Status: DISCONTINUED | OUTPATIENT
Start: 2025-04-01 | End: 2025-04-01 | Stop reason: HOSPADM

## 2025-04-01 RX ORDER — PHENYLEPHRINE HCL IN 0.9% NACL 1 MG/10 ML
SYRINGE (ML) INTRAVENOUS
Status: DISCONTINUED | OUTPATIENT
Start: 2025-04-01 | End: 2025-04-01 | Stop reason: SDUPTHER

## 2025-04-01 RX ORDER — HYDROMORPHONE HYDROCHLORIDE 1 MG/ML
0.5 INJECTION, SOLUTION INTRAMUSCULAR; INTRAVENOUS; SUBCUTANEOUS EVERY 5 MIN PRN
Refills: 0 | Status: DISCONTINUED | OUTPATIENT
Start: 2025-04-01 | End: 2025-04-01 | Stop reason: HOSPADM

## 2025-04-01 RX ORDER — FENTANYL CITRATE 50 UG/ML
INJECTION, SOLUTION INTRAMUSCULAR; INTRAVENOUS
Status: DISCONTINUED | OUTPATIENT
Start: 2025-04-01 | End: 2025-04-01 | Stop reason: SDUPTHER

## 2025-04-01 RX ORDER — ALBUTEROL SULFATE 90 UG/1
INHALANT RESPIRATORY (INHALATION)
Status: DISCONTINUED | OUTPATIENT
Start: 2025-04-01 | End: 2025-04-01 | Stop reason: SDUPTHER

## 2025-04-01 RX ORDER — LIDOCAINE HYDROCHLORIDE 20 MG/ML
INJECTION, SOLUTION INTRAVENOUS
Status: DISCONTINUED | OUTPATIENT
Start: 2025-04-01 | End: 2025-04-01 | Stop reason: SDUPTHER

## 2025-04-01 RX ORDER — LABETALOL HYDROCHLORIDE 5 MG/ML
10 INJECTION, SOLUTION INTRAVENOUS
Status: DISCONTINUED | OUTPATIENT
Start: 2025-04-01 | End: 2025-04-01 | Stop reason: HOSPADM

## 2025-04-01 RX ORDER — SODIUM CHLORIDE 0.9 % (FLUSH) 0.9 %
5-40 SYRINGE (ML) INJECTION PRN
Status: DISCONTINUED | OUTPATIENT
Start: 2025-04-01 | End: 2025-04-01 | Stop reason: HOSPADM

## 2025-04-01 RX ORDER — PROCHLORPERAZINE EDISYLATE 5 MG/ML
5 INJECTION INTRAMUSCULAR; INTRAVENOUS
Status: COMPLETED | OUTPATIENT
Start: 2025-04-01 | End: 2025-04-01

## 2025-04-01 RX ORDER — SUCCINYLCHOLINE CHLORIDE 20 MG/ML
INJECTION INTRAMUSCULAR; INTRAVENOUS
Status: DISCONTINUED | OUTPATIENT
Start: 2025-04-01 | End: 2025-04-01 | Stop reason: SDUPTHER

## 2025-04-01 RX ORDER — SODIUM CHLORIDE, SODIUM LACTATE, POTASSIUM CHLORIDE, CALCIUM CHLORIDE 600; 310; 30; 20 MG/100ML; MG/100ML; MG/100ML; MG/100ML
INJECTION, SOLUTION INTRAVENOUS
Status: DISCONTINUED | OUTPATIENT
Start: 2025-04-01 | End: 2025-04-01 | Stop reason: SDUPTHER

## 2025-04-01 RX ORDER — IPRATROPIUM BROMIDE AND ALBUTEROL SULFATE 2.5; .5 MG/3ML; MG/3ML
1 SOLUTION RESPIRATORY (INHALATION) ONCE
Status: DISCONTINUED | OUTPATIENT
Start: 2025-04-01 | End: 2025-04-01 | Stop reason: HOSPADM

## 2025-04-01 RX ADMIN — FENTANYL CITRATE 50 MCG: 50 INJECTION, SOLUTION INTRAMUSCULAR; INTRAVENOUS at 07:47

## 2025-04-01 RX ADMIN — DOCUSATE SODIUM 100 MG: 100 CAPSULE, LIQUID FILLED ORAL at 20:26

## 2025-04-01 RX ADMIN — Medication 100 MCG: at 08:11

## 2025-04-01 RX ADMIN — PROPOFOL 30 MG: 10 INJECTION, EMULSION INTRAVENOUS at 07:23

## 2025-04-01 RX ADMIN — HEPARIN SODIUM 5000 UNITS: 5000 INJECTION INTRAVENOUS; SUBCUTANEOUS at 16:01

## 2025-04-01 RX ADMIN — ONDANSETRON 4 MG: 2 INJECTION INTRAMUSCULAR; INTRAVENOUS at 07:31

## 2025-04-01 RX ADMIN — PROPOFOL 60 MG: 10 INJECTION, EMULSION INTRAVENOUS at 07:25

## 2025-04-01 RX ADMIN — PROCHLORPERAZINE EDISYLATE 5 MG: 5 INJECTION INTRAMUSCULAR; INTRAVENOUS at 09:08

## 2025-04-01 RX ADMIN — HYDROMORPHONE HYDROCHLORIDE 0.5 MG: 1 INJECTION, SOLUTION INTRAMUSCULAR; INTRAVENOUS; SUBCUTANEOUS at 08:56

## 2025-04-01 RX ADMIN — ISOSORBIDE MONONITRATE 60 MG: 60 TABLET, EXTENDED RELEASE ORAL at 14:25

## 2025-04-01 RX ADMIN — LIDOCAINE HYDROCHLORIDE 100 MG: 20 INJECTION, SOLUTION INTRAVENOUS at 07:25

## 2025-04-01 RX ADMIN — INSULIN LISPRO 4 UNITS: 100 INJECTION, SOLUTION INTRAVENOUS; SUBCUTANEOUS at 20:26

## 2025-04-01 RX ADMIN — SUCCINYLCHOLINE CHLORIDE 120 MG: 20 INJECTION, SOLUTION INTRAMUSCULAR; INTRAVENOUS at 07:25

## 2025-04-01 RX ADMIN — SODIUM CHLORIDE, SODIUM LACTATE, POTASSIUM CHLORIDE, AND CALCIUM CHLORIDE: .6; .31; .03; .02 INJECTION, SOLUTION INTRAVENOUS at 00:40

## 2025-04-01 RX ADMIN — Medication 100 MCG: at 07:59

## 2025-04-01 RX ADMIN — WATER 2000 MG: 1 INJECTION INTRAMUSCULAR; INTRAVENOUS; SUBCUTANEOUS at 07:28

## 2025-04-01 RX ADMIN — ATORVASTATIN CALCIUM 40 MG: 40 TABLET, FILM COATED ORAL at 20:26

## 2025-04-01 RX ADMIN — OXYCODONE HYDROCHLORIDE AND ACETAMINOPHEN 1 TABLET: 7.5; 325 TABLET ORAL at 00:12

## 2025-04-01 RX ADMIN — AMLODIPINE BESYLATE 10 MG: 10 TABLET ORAL at 14:25

## 2025-04-01 RX ADMIN — HYDROMORPHONE HYDROCHLORIDE 0.5 MG: 1 INJECTION, SOLUTION INTRAMUSCULAR; INTRAVENOUS; SUBCUTANEOUS at 08:40

## 2025-04-01 RX ADMIN — DEXMEDETOMIDINE HYDROCHLORIDE 4 MCG: 100 INJECTION, SOLUTION INTRAVENOUS at 08:42

## 2025-04-01 RX ADMIN — ALBUTEROL SULFATE 2.5 MG: 2.5 SOLUTION RESPIRATORY (INHALATION) at 15:30

## 2025-04-01 RX ADMIN — ALBUTEROL SULFATE 4 PUFF: 90 AEROSOL, METERED RESPIRATORY (INHALATION) at 08:03

## 2025-04-01 RX ADMIN — OXYCODONE HYDROCHLORIDE AND ACETAMINOPHEN 1 TABLET: 7.5; 325 TABLET ORAL at 14:25

## 2025-04-01 RX ADMIN — SODIUM CHLORIDE, SODIUM LACTATE, POTASSIUM CHLORIDE, AND CALCIUM CHLORIDE: .6; .31; .03; .02 INJECTION, SOLUTION INTRAVENOUS at 07:15

## 2025-04-01 RX ADMIN — PROPOFOL 30 MG: 10 INJECTION, EMULSION INTRAVENOUS at 07:34

## 2025-04-01 RX ADMIN — OXYCODONE HYDROCHLORIDE AND ACETAMINOPHEN 1 TABLET: 7.5; 325 TABLET ORAL at 20:33

## 2025-04-01 RX ADMIN — DEXMEDETOMIDINE HYDROCHLORIDE 4 MCG: 100 INJECTION, SOLUTION INTRAVENOUS at 08:39

## 2025-04-01 RX ADMIN — DEXMEDETOMIDINE HYDROCHLORIDE 4 MCG: 100 INJECTION, SOLUTION INTRAVENOUS at 08:48

## 2025-04-01 RX ADMIN — FENTANYL CITRATE 50 MCG: 50 INJECTION, SOLUTION INTRAMUSCULAR; INTRAVENOUS at 07:51

## 2025-04-01 RX ADMIN — ALBUTEROL SULFATE 4 PUFF: 90 AEROSOL, METERED RESPIRATORY (INHALATION) at 08:26

## 2025-04-01 RX ADMIN — ALBUTEROL SULFATE 2.5 MG: 2.5 SOLUTION RESPIRATORY (INHALATION) at 19:56

## 2025-04-01 RX ADMIN — GADOTERIDOL 20 ML: 279.3 INJECTION, SOLUTION INTRAVENOUS at 13:49

## 2025-04-01 RX ADMIN — INSULIN LISPRO 4 UNITS: 100 INJECTION, SOLUTION INTRAVENOUS; SUBCUTANEOUS at 17:40

## 2025-04-01 RX ADMIN — DEXMEDETOMIDINE HYDROCHLORIDE 4 MCG: 100 INJECTION, SOLUTION INTRAVENOUS at 08:45

## 2025-04-01 RX ADMIN — BENZOCAINE 6 MG-MENTHOL 10 MG LOZENGES 1 LOZENGE: at 16:35

## 2025-04-01 RX ADMIN — PREDNISONE 40 MG: 20 TABLET ORAL at 14:25

## 2025-04-01 RX ADMIN — FENTANYL CITRATE 100 MCG: 50 INJECTION, SOLUTION INTRAMUSCULAR; INTRAVENOUS at 07:18

## 2025-04-01 RX ADMIN — ALBUTEROL SULFATE 2.5 MG: 2.5 SOLUTION RESPIRATORY (INHALATION) at 08:15

## 2025-04-01 RX ADMIN — Medication 20 MG: at 07:35

## 2025-04-01 RX ADMIN — HEPARIN SODIUM 5000 UNITS: 5000 INJECTION INTRAVENOUS; SUBCUTANEOUS at 20:25

## 2025-04-01 RX ADMIN — LIDOCAINE HYDROCHLORIDE 100 MG: 20 INJECTION, SOLUTION INTRAVENOUS at 08:26

## 2025-04-01 ASSESSMENT — PAIN DESCRIPTION - PAIN TYPE
TYPE: CHRONIC PAIN
TYPE: CHRONIC PAIN;SURGICAL PAIN

## 2025-04-01 ASSESSMENT — PAIN DESCRIPTION - LOCATION
LOCATION: GENERALIZED
LOCATION: GENERALIZED
LOCATION: OTHER (COMMENT)
LOCATION: GENERALIZED

## 2025-04-01 ASSESSMENT — PAIN DESCRIPTION - DESCRIPTORS
DESCRIPTORS: SORE
DESCRIPTORS: ACHING
DESCRIPTORS: DISCOMFORT
DESCRIPTORS: ACHING;DISCOMFORT

## 2025-04-01 ASSESSMENT — PAIN DESCRIPTION - FREQUENCY
FREQUENCY: CONTINUOUS
FREQUENCY: CONTINUOUS

## 2025-04-01 ASSESSMENT — PAIN SCALES - GENERAL
PAINLEVEL_OUTOF10: 4
PAINLEVEL_OUTOF10: 0
PAINLEVEL_OUTOF10: 10
PAINLEVEL_OUTOF10: 0
PAINLEVEL_OUTOF10: 10
PAINLEVEL_OUTOF10: 0
PAINLEVEL_OUTOF10: 7
PAINLEVEL_OUTOF10: 10
PAINLEVEL_OUTOF10: 6

## 2025-04-01 ASSESSMENT — PAIN - FUNCTIONAL ASSESSMENT
PAIN_FUNCTIONAL_ASSESSMENT: PREVENTS OR INTERFERES SOME ACTIVE ACTIVITIES AND ADLS
PAIN_FUNCTIONAL_ASSESSMENT: PREVENTS OR INTERFERES SOME ACTIVE ACTIVITIES AND ADLS
PAIN_FUNCTIONAL_ASSESSMENT: NONE - DENIES PAIN

## 2025-04-01 ASSESSMENT — PAIN DESCRIPTION - ONSET
ONSET: ON-GOING
ONSET: ON-GOING

## 2025-04-01 ASSESSMENT — PAIN DESCRIPTION - ORIENTATION: ORIENTATION: LEFT

## 2025-04-01 NOTE — PROGRESS NOTES
Patient off the floor for numerous runs, no set date for discharge. Will hold off on home O2 evaluation and re-order when set date is confirmed. Possibly wean off O2 in time

## 2025-04-01 NOTE — CARE COORDINATION
DISCHARGE PLANNING:    Chart reviewed.    Patient is from home alone.     Patient admitted with lung mass, Lymphadenopathy, CT showed lesions compatible with metastatic disease & adrenal nodule, Left axillary deep lymph node excisional biopsy POD # 0, General surgery, Oncology & Pulmonology following     Plan is to return home. CM to follow for discharge needs.  Family to transport.     Electronically signed by NAVIN Sears, RN Case Manager on 4/1/2025 at 3:47 PM

## 2025-04-01 NOTE — FLOWSHEET NOTE
03/31/25 2043   Assessment   Charting Type Shift assessment   Psychosocial   Psychosocial (WDL) WDL   Neurological   Level of Consciousness 0   Neuro (WDL) WDL   Denver Coma Scale   Eye Opening 4   Best Verbal Response 5   Best Motor Response 6   Sebastian Coma Scale Score 15   HEENT (Head, Ears, Eyes, Nose, & Throat)   HEENT (WDL) X   Right Eye Impaired vision   Left Eye Impaired vision   Teeth Dentures upper;Dentures lower   Respiratory   Respiratory Pattern Regular   Respiratory Depth Normal   Respiratory Quality/Effort Unlabored   Chest Assessment Chest expansion symmetrical   L Breath Sounds Expiratory Wheezes   R Breath Sounds Expiratory Wheezes   Subcutaneous Air/Crepitus None   Respiratory (WDL) X   Cardiac   Cardiac (WDL) WDL   Gastrointestinal   Abdominal (WDL) WDL   Genitourinary   Genitourinary (WDL) X  (external catheter)   Peripheral Vascular   Peripheral Vascular (WDL) WDL   Skin Integumentary    Skin Integumentary (WDL) WDL   Musculoskeletal   RL Extremity Weakness   LL Extremity Weakness   Musculoskeletal (WDL) X

## 2025-04-01 NOTE — PROGRESS NOTES
PACU Transfer Note    Vitals:    04/01/25 0945   BP: (!) 154/76   Pulse: 66   Resp: 12   Temp: 97 °F (36.1 °C)   SpO2: 94%       In: 800 [I.V.:800]  Out: 10     Pain assessment:  BP in guidelines  Pain Level: 0    Report given to Receiving unit RN.    4/1/2025 9:50 AM    Pt more calm . Sleeping at intervals

## 2025-04-01 NOTE — OP NOTE
[address] [address]                            OPERATIVE REPORT      PATIENT NAME: RHONA CARRENO               : 1960  MED REC NO: [id number]                      ROOM: OR  ACCOUNT NO: [id number]                       ADMIT DATE: 2025  PROVIDER: Brock Campa DO      DATE OF PROCEDURE:  2025    SURGEON:  Brock Campa DO    PREOPERATIVE DIAGNOSIS:  Left axillary lymphadenopathy.    POSTOPERATIVE DIAGNOSIS:  Left axillary lymphadenopathy.    PROCEDURE PERFORMED:  Left axillary deep lymph node excisional biopsy.    ANESTHESIA:  GETA    ESTIMATED BLOOD LOSS:  10 mL.    COMPLICATIONS:  None.    CONDITION:  Stable.    INDICATION FOR PROCEDURE:  The patient is a 64-year-old female with newly diagnosed left upper lung mass with significant lymphadenopathy.  We were consulted for excisional biopsy of her deep left axillary lymph nodes.  After long discussion, understanding risks, benefits, and alternatives of the procedure.    DESCRIPTION OF PROCEDURE:  The patient was brought to the operative suite, laid in supine position with arms outstretched, and after anesthesia was administered, the patient was prepped and draped in usual sterile manner.    A local anesthetic was injected and an incision was made using a 10 blade.  Dissection was carried down through subcutaneous tissue, which was quite thick and extensive all the way down to the pectoralis muscle.  Once the muscle was identified, we stayed lateral to it, correlating with the CT scan, and superficial fascia was incised, and the largest of the lymph nodes was found.  A complete excisional biopsy was performed by circumferentially dissecting the lymph node using clips and cautery where appropriate.  Once the lymph node was excised in entirety, this was sent for lymphoma protocol. Hemostasis was maintained.  Irrigation was performed and closure was done in layers.   The patient tolerated procedure well and brought to the postanesthesia care unit in stable condition.    All sponge, needle, instrument counts correct x2.    I personally called the patient's family multiple times to inform them of the outcome of the procedure; however, I was unable to contact family.          VANITA OTERO DO      D:  04/01/2025 08:21:12     T:  04/01/2025 08:42:59     MEGHAN/GAYE  Job #:  [id number]     Doc#:  [id number]

## 2025-04-01 NOTE — PROGRESS NOTES
Cheyenne Hayes-KarlrMary NP    Patient is newly dx with CA. Patient currently has Percocet 7.5-325 mg @ 6 hr.  She was given  her last dose at 1812.  patient is saying that she got it earlier than that and is requesting a one time dose of pain medication. Thank you, bola      New orders given.

## 2025-04-01 NOTE — FLOWSHEET NOTE
04/01/25 1416   Vitals   Temp 97.2 °F (36.2 °C)   Temp Source Oral   Pulse 88   Heart Rate Source Monitor   Respirations 14   BP (!) 146/100   MAP (Calculated) 115   BP Location Left upper arm   BP Upper/Lower Upper   BP Method Automatic   Patient Position Semi fowlers   Oxygen Therapy   SpO2 (!) 82 %   O2 Device None (Room air)     Patient returned to unit from MRI.

## 2025-04-01 NOTE — PLAN OF CARE
Problem: Chronic Conditions and Co-morbidities  Goal: Patient's chronic conditions and co-morbidity symptoms are monitored and maintained or improved  Outcome: Progressing     Problem: Discharge Planning  Goal: Discharge to home or other facility with appropriate resources  Outcome: Progressing     Problem: Pain  Goal: Verbalizes/displays adequate comfort level or baseline comfort level  Outcome: Progressing     Problem: Safety - Adult  Goal: Free from fall injury  Outcome: Progressing     Problem: Metabolic/Fluid and Electrolytes - Adult  Goal: Electrolytes maintained within normal limits  Outcome: Progressing     Problem: Skin/Tissue Integrity  Goal: Skin integrity remains intact  Description: 1.  Monitor for areas of redness and/or skin breakdown  2.  Assess vascular access sites hourly  3.  Every 4-6 hours minimum:  Change oxygen saturation probe site  4.  Every 4-6 hours:  If on nasal continuous positive airway pressure, respiratory therapy assess nares and determine need for appliance change or resting period  Outcome: Progressing

## 2025-04-01 NOTE — BRIEF OP NOTE
Brief Postoperative Note      Patient: Veronica Vargas  YOB: 1960  MRN: [id number]    Date of Procedure: 4/1/2025    Pre-Op Diagnosis Codes:      * Lymphadenopathy [R59.1]    Post-Op Diagnosis: Same       Procedure(s):  DEEP LEFT AXILLARY LYMPH NODE EXCISIONAL BIOPSY    Surgeon(s):  Brock Campa DO    Assistant:  Surgical Assistant: Jorge Hughes  Resident: Oseas Barakat MD    Anesthesia: General    Estimated Blood Loss (mL): Minimal    Complications: None    Specimens:   ID Type Source Tests Collected by Time Destination   A : LEFT AXILLARY LYMPH NODE *(FOR LYMPHOMA PROTOCOL) Tissue Lymph Node SURGICAL PATHOLOGY Brock Campa DO 4/1/2025 0800        Implants:  * No implants in log *      Drains:   External Urinary Catheter (Active)   Site Assessment Clean,dry & intact 04/01/25 0330   Placement Replaced 04/01/25 0330   Catheter Care Catheter/Wick replaced 04/01/25 0330   Perineal Care Yes 04/01/25 0330   Suction 40 mmgHg continuous 04/01/25 0330   Urine Appearance Clear 04/01/25 0330   Output (mL) 400 mL 04/01/25 0401       Findings:  Infection Present At Time Of Surgery (PATOS) (choose all levels that have infection present):  No infection present  Other Findings: Pathologic left axillary lymph node excised, sent for lymphoma protocol  This procedure was not performed to treat cancer       Electronically signed by Oseas Barakat MD on 4/1/2025 at 8:23 AM

## 2025-04-01 NOTE — PROGRESS NOTES
Called son Rob twice and daughter once no response    Procedure went well as scheduled    Brock Campa

## 2025-04-01 NOTE — PROGRESS NOTES
Department of Surgery  Post Op Note    Objective:  States she was very uncomfortable during MRI. Denies nausea or vomiting. Reports having a bm.     Anesthesia type: General      I/O    Intra op    Post op     Fluids     50     EBL  minimal       Urine  1 occurrence        Exam: VITALS:  BP (!) 154/76   Pulse 66   Temp 97 °F (36.1 °C)   Resp 12   Ht 1.651 m (5' 5\")   Wt 98.1 kg (216 lb 4.3 oz)   SpO2 94%   BMI 35.99 kg/m²   Post-op vital signs:  Stable     Exam:General appearance: alert, appears stated age, and cooperative  Lungs: symmetrical chest rise on room air, no accessory muscle use  Heart: appears well perfused  Left axiliary- incisions C/D/I, well approximated with dermabond         Assessment and Plan  Pt is a 64 year old female s/p DEEP LEFT AXILLARY LYMPH NODE EXCISIONAL BIOPSY  POD #0    Pain management  Diet - Regular  , Fluids LR @ 50 ml/hour   :  has voided   Ambulation: OOB to chair  Respiratory:  IS at bedside, encourage hourly IS and deep breathing  Prophylaxis: YRN Schwartz, CNP  General Surgery

## 2025-04-01 NOTE — PROGRESS NOTES
Changed to 3L/NC, sat now 93%, pt more cooperative/ calm from meds, see MAR for meds.  Denies pain

## 2025-04-01 NOTE — PROGRESS NOTES
Patient returned from PACU.  Heart monitor placed back on.  Patient is very tired states \" she was in a wrestling match down there.\" Son at bedside wanting to be updated with plan of care.  Perfect serve sent to MD.  Patient given a warm blanket .

## 2025-04-01 NOTE — PROGRESS NOTES
Bariatric Surgery   Daily Progress Note  Patient: Veronica Vargas      CC: axillary lymph node biopsy     SUBJECTIVE:   No complaints. All questions answered for OR.     ROS:   A 14 point review of systems was conducted, significant findings as noted above. All other systems negative.    OBJECTIVE:    PHYSICAL EXAM:    Vitals:    04/01/25 0042 04/01/25 0340 04/01/25 0624 04/01/25 0702   BP:  (!) 159/93  (!) 185/83   Pulse:  86  88   Resp: 18   16   Temp:  97.9 °F (36.6 °C)  98.3 °F (36.8 °C)   TempSrc:  Oral  Oral   SpO2:  94%  95%   Weight:   98.1 kg (216 lb 4.3 oz)    Height:         General appearance: alert, no acute distress, grooming appropriate  Chest/Lungs: Normal effort with no accessory muscle use on RA  Cardiovascular: regular rate  Abdomen: Soft, non-tender, non-distended, no rebound, guarding, or rigidity.  Skin: warm and dry, no rashes  Extremities: Bilateral axilla with multiple healed scars from recurrent hidradenitis, nodularity appreciated L>R  Genitourinary:   Neuro: A&Ox3, no focal deficits, sensation intact      ASSESSMENT & PLAN:   This is a 64 y.o. female with Hx listed above presents to [facility] with worsening shortness of breath and was found to have a new dominant centrilobular/irregular mass within the anterior left upper lobe with associated mediastinal, hilar and axillary lymphadenopathy concerning for metastatic disease. General surgery consulted for left axillary lymph node biopsy.     - NPO for OR Today    Shweta Schwartz, CNP  General Surgery

## 2025-04-01 NOTE — PROGRESS NOTES
Internal Medicine Progress Note    Date: 4/1/2025   Patient: Veronica RAHMAN [facility] Day: 2      CC: Shortness of Breath and Leg Pain       Interval Hx   No acute events overnight  Patient is seen on bedside. No acute distress  - No active complaint today    - Vitals signs are stable /76. Still on 4 L of O2.     - S/P left axillary LN biopsy by general surgery, awaiting surgical pathology     Thoracic and pelvis MRI per Radio/Onc. Awaiting surgical pathology before starting radiation.        Objective     Vital Signs:  Patient Vitals for the past 8 hrs:   BP Temp Temp src Pulse Resp SpO2 Weight   04/01/25 0945 (!) 154/76 97 °F (36.1 °C) -- 66 12 94 % --   04/01/25 0930 -- -- -- -- -- 91 % --   04/01/25 0900 -- -- -- 68 15 99 % --   04/01/25 0856 (!) 151/89 -- -- 69 16 (!) 89 % --   04/01/25 0845 (!) 147/134 -- -- 76 19 (!) 79 % --   04/01/25 0840 (!) 143/77 -- -- -- -- -- --   04/01/25 0835 (!) 144/70 97 °F (36.1 °C) Temporal 88 -- 91 % --   04/01/25 0702 (!) 185/83 98.3 °F (36.8 °C) Oral 88 16 95 % --   04/01/25 0624 -- -- -- -- -- -- 98.1 kg (216 lb 4.3 oz)   04/01/25 0340 (!) 159/93 97.9 °F (36.6 °C) Oral 86 -- 94 % --       Physical Exam  Constitutional:       General: She is in acute distress.   HENT:      Head: Normocephalic and atraumatic.      Right Ear: Tympanic membrane normal.      Left Ear: Tympanic membrane normal.      Nose: Nose normal.      Mouth/Throat:      Mouth: Mucous membranes are moist.      Pharynx: Oropharynx is clear.   Eyes:      General:         Right eye: No discharge.         Left eye: No discharge.      Pupils: Pupils are equal, round, and reactive to light.   Cardiovascular:      Rate and Rhythm: Normal rate and regular rhythm.      Pulses: Normal pulses.      Heart sounds: Normal heart sounds.   Pulmonary:      Effort: Pulmonary effort is normal.      Breath sounds: Wheezing present.   Abdominal:      General: Bowel sounds are normal.      Palpations: There is no mass.  Nephrotoxic meds  - Monitor I/Os strictly        # Chronic Normocytic Anemia:   Reports chronic melena and taking Iron supplements.   2/2 to Obscure GI bleeding (EGD and colonoscopy and capsule was negative) - Hgb on admission 9.8   - CBC daily  - Iron studies ordered        # T2DM   Home regimen   Last HbA1c (12/5/2024) 6.7  - Medium dose sliding scale  - POCT glucose x 4  - Hypoglycemia protocol        # Hypertension - continue amlodipine tablet 10 mg daily  # Hyperlipidemia -  continue statin 40 mg nightly        # PAfib:   Sinus rhythm on EKG. [id number] score 5 . Not on anticoagulation due to GI bleeding and chronic anemia. She was amiodarone for AF d/c on 2/2025  - Continue home Coreg 12.5 mg twice daily   - Continuous telemetry     # Obesity   - BMI 37.61      DVT PPx:   Diet: ADULT DIET; Regular   Code status:  Full Code     ELOS:  Barriers to discharge:  Disposition  - Preadmission: home  - Current: 6371  - Upon discharge: TBD    Will discuss with attending physician Dr. Cartagena, Ronnell GEE MD     _____________________  Luther Horvath MD   Internal Medicine Resident, PGY-1

## 2025-04-01 NOTE — PROGRESS NOTES
Patient called out feeling SOB.  Oxygen level 88 % on 2 liters.  Patient is wheezing.  Turned oxygen up to 4 liters.  Called respiratory for a breathing treatment

## 2025-04-01 NOTE — PROGRESS NOTES
T arrived form OR, s/p DEEP LEFT AXILLARY LYMPH NODE EXCISIONAL BIOPSY - Left , report received from CRNA and Or RN, pt very agitated on arrival, pulling off O2, sat 78 % whenthe pt pulled off O2.  Precedex given per CRNA. Dr. Wood called

## 2025-04-01 NOTE — ANESTHESIA POSTPROCEDURE EVALUATION
Department of Anesthesiology  Postprocedure Note    Patient: Veronica Vargas  MRN: [id number]  YOB: 1960  Date of evaluation: 4/1/2025    Procedure Summary       Date: 04/01/25 Room / Location: [place] [address] [facility]    Anesthesia Start: 0715 Anesthesia Stop: 0840    Procedure: DEEP LEFT AXILLARY LYMPH NODE EXCISIONAL BIOPSY (Left: Axilla) Diagnosis:       Lymphadenopathy      (Lymphadenopathy [R59.1])    Surgeons: Brock Campa DO Responsible Provider: Lopez Wood DO    Anesthesia Type: general ASA Status: 3            Anesthesia Type: No value filed.    Ravi Phase I: Ravi Score: 8    Ravi Phase II:      Vitals:    04/01/25 0930   BP:    Pulse:    Resp:    Temp:    SpO2: 91%       Anesthesia Post Evaluation    Patient location during evaluation: PACU  Patient participation: complete - patient participated  Level of consciousness: awake and awake and alert  Pain score: 2  Airway patency: patent  Nausea & Vomiting: no nausea and no vomiting  Cardiovascular status: hemodynamically stable  Respiratory status: acceptable  Hydration status: euvolemic  Comments: Veronica Vargas unwilling to keep oxygen on even with oxygen saturations in the mid 80s. She is fighting with the bedside PACU nurse and being combative. Attempted to reason with Veronica Vargas, and discussed the importance of oxygen use in setting of low pulse-oximetry readings and Veronica Vargas was still unwilling to keep her supplemental oxygen in place. Will continue to attempt education, incentive spirometry, and supplemental oxygen supplementation as Veronica Vargas will allow.  Pain management: adequate and satisfactory to patient        No notable events documented.

## 2025-04-01 NOTE — ANESTHESIA PRE PROCEDURE
results found for: \"PREGTESTUR\", \"PREGSERUM\", \"HCG\", \"HCGQUANT\"     ABGs: No results found for: \"PHART\", \"PO2ART\", \"ZZY8YGS\", \"NFO6KOV\", \"BEART\", \"L3GXBLJW\"     Type & Screen (If Applicable):  Lab Results   Component Value Date    ABORH O POS 03/31/2025    LABANTI NEG 03/31/2025       Drug/Infectious Status (If Applicable):  No results found for: \"HIV\", \"HEPCAB\"    COVID-19 Screening (If Applicable):   Lab Results   Component Value Date/Time    COVID19 NOT DETECTED 03/30/2025 12:13 PM           Anesthesia Evaluation  Patient summary reviewed   no history of anesthetic complications:   Airway: Mallampati: III  TM distance: >3 FB   Neck ROM: full  Mouth opening: > = 3 FB   Dental:    (+) edentulous      Pulmonary: breath sounds clear to auscultation      (-) COPD, asthma, rhonchi, wheezes and no decreased breath sounds                           Cardiovascular:  Exercise tolerance: poor (<4 METS)  (+) hypertension:, CHF: diastolic, hyperlipidemia    (-) past MI, CABG/stent and murmur      Rhythm: regular  Rate: normal  Echocardiogram reviewed               ROS comment: ECHO 3/31/25:    ·  Left Ventricle: Normal left ventricular systolic function with a visually estimated EF of 55 - 60%. EF by 2D Simpsons Biplane is 60%. Left ventricle size is normal. Moderately increased wall thickness. Normal wall motion. Grade I diastolic dysfunction with normal LAP.  ·  Right Ventricle: Right ventricle size is normal. Normal systolic function.  ·  Aortic Valve: No regurgitation.  ·  Mitral Valve: No regurgitation.  ·  Tricuspid Valve: No regurgitation.  ·  Pericardium: Small (<1 cm) pericardial effusion present. No indication of cardiac tamponade.  ·  Image quality is technically difficult. Contrast used: Lumason. Technically difficult study due to patient's body habitus.       Neuro/Psych:      (-) seizures, TIA and CVA           GI/Hepatic/Renal:   (+) renal disease: CRI     (-) liver disease and no morbid obesity

## 2025-04-01 NOTE — CONSULTS
[facility]    Consult Note      Requesting Provider:  Davie Reardon DO     CHIEF COMPLAINT:  Progressive worsening dyspnea.  Mid back pain after falling on the stairs.        HISTORY OF PRESENT ILLNESS:      Ms. Vargas  is a 64 y.o. female we are seeing in consultation for New diagnosis lung mass, left axillary lymphadenopathy and T10 vertebral mass.  Reported progressive dyspnea got significantly worse over the last 2 days.  Noting productive cough but denies fever or chills.  Also reporting progressive fatigue.  States that she has to take frequent breaks just walking around her house.    Ultimately she reported to the ER due to progressive dyspnea.  CTA of the chest was performed which revealed 3.1 cm anterior left upper lobe centrilobular lung mass with conglomerate masses in the anterior mediastinum, right hilar lymphadenopathy, pericardial effusion and extensive lymphadenopathy in the left axilla and pectoral chains.  Imaging also revealed an osteolytic lesion at T10 and the right T10 pedicle corresponding to her area of back pain.  CT of the abdomen pelvis revealed an indeterminate 2 cm left adrenal nodule and a lytic lesion at the right SI joint.    States also fell on the stairs about a month ago and fell backwards hitting her back and her back has hurt ever since on the right side.  Also reporting right lower extremity radiculopathy.  Noting increased weakness over this past several weeks.    Decreased appetite.  Has lost about 24 pounds over the last 2 months.    Previous history of diastolic heart failure, stage IV kidney disease, diabetes and hypertension.  Previous smoker,'s 50-pack-year smoking history.  Quit approximately 1 month ago.  Occasional social drinker.    She is accompanied by her daughter at the time of this visit.    Patient was scheduled for lymph node

## 2025-04-01 NOTE — PROGRESS NOTES
ONCOLOGY HEMATOLOGY CARE PROGRESS NOTE      SUBJECTIVE:  Veronica underwent excisional biopsy of her left axillary node earlier today, path is pending.  She is going for MRI of the T-spine and pelvis later today.  She reports continued mid back pain and pelvic pain.  Denies any numbness, tingling or weakness in her extremities outside of her normal neuropathy.  Able to ambulate with some mild assistance earlier today.  Little groggy from anesthesia, otherwise no new issues. Stable shortness of breath, on 4 L of oxygen.    OBJECTIVE        Physical    VITALS:  Patient Vitals for the past 24 hrs:   BP Temp Temp src Pulse Resp SpO2 Weight   04/01/25 0945 (!) 154/76 97 °F (36.1 °C) -- 66 12 94 % --   04/01/25 0930 -- -- -- -- -- 91 % --   04/01/25 0900 -- -- -- 68 15 99 % --   04/01/25 0856 (!) 151/89 -- -- 69 16 (!) 89 % --   04/01/25 0845 (!) 147/134 -- -- 76 19 (!) 79 % --   04/01/25 0840 (!) 143/77 -- -- -- -- -- --   04/01/25 0835 (!) 144/70 97 °F (36.1 °C) Temporal 88 -- 91 % --   04/01/25 0702 (!) 185/83 98.3 °F (36.8 °C) Oral 88 16 95 % --   04/01/25 0624 -- -- -- -- -- -- 98.1 kg (216 lb 4.3 oz)   04/01/25 0340 (!) 159/93 97.9 °F (36.6 °C) Oral 86 -- 94 % --   04/01/25 0042 -- -- -- -- 18 -- --   04/01/25 0012 -- -- -- -- 18 -- --   04/01/25 0000 (!) 175/88 98 °F (36.7 °C) Oral 82 18 94 % --   03/31/25 2315 -- -- -- -- 18 -- --   03/31/25 2042 (!) 176/79 97.7 °F (36.5 °C) Oral 85 18 97 % --   03/31/25 1134 (!) 174/64 97.9 °F (36.6 °C) Oral 83 14 95 % --       24HR INTAKE/OUTPUT:    Intake/Output Summary (Last 24 hours) at 4/1/2025 1047  Last data filed at 4/1/2025 0943  Gross per 24 hour   Intake 1130 ml   Output 960 ml   Net 170 ml       CONSTITUTIONAL: awake, alert, cooperative, no apparent distress   EYES: pupils equal, round   ENT: Normocephalic, without obvious abnormality, atraumatic  NECK: supple, symmetrical   HEMATOLOGIC/LYMPHATIC: no cervical lymphadenopathy  benefits, short and long-term toxicities.  All questions were addressed, and she was agreeable to proceed.    Will have her come down for CT simulation today or tomorrow, with plans to start palliative radiation likely Monday next week as an outpatient.  Depending on pathology, we will plan for a dose of 20-30 Gray in 5-10 fractions using 3D conformal technique with daily image guidance.    At some point in the future, we may consider additional palliative radiation to the thorax depending on path and systemic therapy options     ONCOLOGIC DISPOSITION:  To rad/onc dept today for CT sim - likely to start early next week.   MRI T spine and pelvis pending.   F/u on path.     Stephenie West MD  Please contact through [employer]

## 2025-04-02 VITALS
TEMPERATURE: 98.1 F | DIASTOLIC BLOOD PRESSURE: 89 MMHG | HEART RATE: 79 BPM | RESPIRATION RATE: 18 BRPM | OXYGEN SATURATION: 95 % | BODY MASS INDEX: 36.47 KG/M2 | SYSTOLIC BLOOD PRESSURE: 166 MMHG | WEIGHT: 218.92 LBS | HEIGHT: 65 IN

## 2025-04-02 LAB
ALBUMIN SERPL-MCNC: 4 G/DL (ref 3.4–5)
ANION GAP SERPL CALCULATED.3IONS-SCNC: 13 MMOL/L (ref 3–16)
BASOPHILS # BLD: 0.1 K/UL (ref 0–0.2)
BASOPHILS NFR BLD: 0.7 %
BUN SERPL-MCNC: 26 MG/DL (ref 7–20)
CALCIUM SERPL-MCNC: 9.4 MG/DL (ref 8.3–10.6)
CHLORIDE SERPL-SCNC: 105 MMOL/L (ref 99–110)
CO2 SERPL-SCNC: 22 MMOL/L (ref 21–32)
CREAT SERPL-MCNC: 1.6 MG/DL (ref 0.6–1.2)
DEPRECATED RDW RBC AUTO: 17.2 % (ref 12.4–15.4)
EOSINOPHIL # BLD: 0 K/UL (ref 0–0.6)
EOSINOPHIL NFR BLD: 0.1 %
GFR SERPLBLD CREATININE-BSD FMLA CKD-EPI: 35 ML/MIN/{1.73_M2}
GLUCOSE BLD-MCNC: 231 MG/DL (ref 70–99)
GLUCOSE BLD-MCNC: 306 MG/DL (ref 70–99)
GLUCOSE SERPL-MCNC: 225 MG/DL (ref 70–99)
HCT VFR BLD AUTO: 28.9 % (ref 36–48)
HGB BLD-MCNC: 9.3 G/DL (ref 12–16)
LYMPHOCYTES # BLD: 0.8 K/UL (ref 1–5.1)
LYMPHOCYTES NFR BLD: 7 %
MAGNESIUM SERPL-MCNC: 2.47 MG/DL (ref 1.8–2.4)
MCH RBC QN AUTO: 28.5 PG (ref 26–34)
MCHC RBC AUTO-ENTMCNC: 32.2 G/DL (ref 31–36)
MCV RBC AUTO: 88.6 FL (ref 80–100)
MONOCYTES # BLD: 0.6 K/UL (ref 0–1.3)
MONOCYTES NFR BLD: 4.9 %
NEUTROPHILS # BLD: 9.9 K/UL (ref 1.7–7.7)
NEUTROPHILS NFR BLD: 87.3 %
PERFORMED ON: ABNORMAL
PERFORMED ON: ABNORMAL
PHOSPHATE SERPL-MCNC: 4.2 MG/DL (ref 2.5–4.9)
PLATELET # BLD AUTO: 258 K/UL (ref 135–450)
PMV BLD AUTO: 8.1 FL (ref 5–10.5)
POTASSIUM SERPL-SCNC: 4.9 MMOL/L (ref 3.5–5.1)
RBC # BLD AUTO: 3.26 M/UL (ref 4–5.2)
SODIUM SERPL-SCNC: 140 MMOL/L (ref 136–145)
WBC # BLD AUTO: 11.4 K/UL (ref 4–11)

## 2025-04-02 PROCEDURE — 80069 RENAL FUNCTION PANEL: CPT

## 2025-04-02 PROCEDURE — 6360000002 HC RX W HCPCS: Performed by: STUDENT IN AN ORGANIZED HEALTH CARE EDUCATION/TRAINING PROGRAM

## 2025-04-02 PROCEDURE — 94618 PULMONARY STRESS TESTING: CPT

## 2025-04-02 PROCEDURE — 36415 COLL VENOUS BLD VENIPUNCTURE: CPT

## 2025-04-02 PROCEDURE — 6370000000 HC RX 637 (ALT 250 FOR IP)

## 2025-04-02 PROCEDURE — 6370000000 HC RX 637 (ALT 250 FOR IP): Performed by: INTERNAL MEDICINE

## 2025-04-02 PROCEDURE — 85025 COMPLETE CBC W/AUTO DIFF WBC: CPT

## 2025-04-02 PROCEDURE — 6370000000 HC RX 637 (ALT 250 FOR IP): Performed by: STUDENT IN AN ORGANIZED HEALTH CARE EDUCATION/TRAINING PROGRAM

## 2025-04-02 PROCEDURE — 83735 ASSAY OF MAGNESIUM: CPT

## 2025-04-02 PROCEDURE — 94640 AIRWAY INHALATION TREATMENT: CPT

## 2025-04-02 PROCEDURE — 2700000000 HC OXYGEN THERAPY PER DAY

## 2025-04-02 PROCEDURE — 94761 N-INVAS EAR/PLS OXIMETRY MLT: CPT

## 2025-04-02 RX ORDER — INSULIN GLARGINE 100 [IU]/ML
5 INJECTION, SOLUTION SUBCUTANEOUS DAILY
Status: DISCONTINUED | OUTPATIENT
Start: 2025-04-02 | End: 2025-04-02 | Stop reason: HOSPADM

## 2025-04-02 RX ORDER — INSULIN GLARGINE 100 [IU]/ML
5 INJECTION, SOLUTION SUBCUTANEOUS DAILY
Qty: 10 ML | Refills: 3 | Status: SHIPPED | OUTPATIENT
Start: 2025-04-03

## 2025-04-02 RX ORDER — GLUCAGON 1 MG/ML
1 KIT INJECTION PRN
Status: DISCONTINUED | OUTPATIENT
Start: 2025-04-02 | End: 2025-04-02 | Stop reason: HOSPADM

## 2025-04-02 RX ORDER — HYDRALAZINE HYDROCHLORIDE 25 MG/1
25 TABLET, FILM COATED ORAL
Status: COMPLETED | OUTPATIENT
Start: 2025-04-02 | End: 2025-04-02

## 2025-04-02 RX ORDER — ISOSORBIDE MONONITRATE 60 MG/1
60 TABLET, EXTENDED RELEASE ORAL DAILY
Qty: 30 TABLET | Refills: 3 | Status: SHIPPED | OUTPATIENT
Start: 2025-04-03

## 2025-04-02 RX ORDER — PREDNISONE 20 MG/1
40 TABLET ORAL DAILY
Qty: 2 TABLET | Refills: 0 | Status: SHIPPED | OUTPATIENT
Start: 2025-04-03 | End: 2025-04-04

## 2025-04-02 RX ORDER — ALBUTEROL SULFATE 0.83 MG/ML
2.5 SOLUTION RESPIRATORY (INHALATION)
Status: DISCONTINUED | OUTPATIENT
Start: 2025-04-02 | End: 2025-04-02 | Stop reason: HOSPADM

## 2025-04-02 RX ORDER — HYDRALAZINE HYDROCHLORIDE 20 MG/ML
5 INJECTION INTRAMUSCULAR; INTRAVENOUS EVERY 6 HOURS PRN
Status: DISCONTINUED | OUTPATIENT
Start: 2025-04-02 | End: 2025-04-02 | Stop reason: HOSPADM

## 2025-04-02 RX ORDER — GABAPENTIN 300 MG/1
300 CAPSULE ORAL 3 TIMES DAILY
Qty: 90 CAPSULE | Refills: 3 | Status: SHIPPED | OUTPATIENT
Start: 2025-04-02 | End: 2025-05-02

## 2025-04-02 RX ORDER — INSULIN LISPRO 100 [IU]/ML
0-16 INJECTION, SOLUTION INTRAVENOUS; SUBCUTANEOUS
Status: DISCONTINUED | OUTPATIENT
Start: 2025-04-02 | End: 2025-04-02 | Stop reason: HOSPADM

## 2025-04-02 RX ORDER — FERROUS GLUCONATE 324(37.5)
324 TABLET ORAL 2 TIMES DAILY
Qty: 60 TABLET | Refills: 0 | Status: SHIPPED | OUTPATIENT
Start: 2025-04-02 | End: 2025-05-02

## 2025-04-02 RX ORDER — DEXTROSE MONOHYDRATE 100 MG/ML
INJECTION, SOLUTION INTRAVENOUS CONTINUOUS PRN
Status: DISCONTINUED | OUTPATIENT
Start: 2025-04-02 | End: 2025-04-02 | Stop reason: HOSPADM

## 2025-04-02 RX ADMIN — INSULIN GLARGINE 5 UNITS: 100 INJECTION, SOLUTION SUBCUTANEOUS at 10:09

## 2025-04-02 RX ADMIN — INSULIN LISPRO 12 UNITS: 100 INJECTION, SOLUTION INTRAVENOUS; SUBCUTANEOUS at 12:02

## 2025-04-02 RX ADMIN — AMLODIPINE BESYLATE 10 MG: 10 TABLET ORAL at 08:48

## 2025-04-02 RX ADMIN — HEPARIN SODIUM 5000 UNITS: 5000 INJECTION INTRAVENOUS; SUBCUTANEOUS at 05:47

## 2025-04-02 RX ADMIN — OXYCODONE HYDROCHLORIDE AND ACETAMINOPHEN 1 TABLET: 7.5; 325 TABLET ORAL at 05:47

## 2025-04-02 RX ADMIN — HYDRALAZINE HYDROCHLORIDE 25 MG: 25 TABLET ORAL at 05:56

## 2025-04-02 RX ADMIN — DOCUSATE SODIUM 100 MG: 100 CAPSULE, LIQUID FILLED ORAL at 08:48

## 2025-04-02 RX ADMIN — ALBUTEROL SULFATE 2.5 MG: 2.5 SOLUTION RESPIRATORY (INHALATION) at 08:21

## 2025-04-02 RX ADMIN — HEPARIN SODIUM 5000 UNITS: 5000 INJECTION INTRAVENOUS; SUBCUTANEOUS at 14:52

## 2025-04-02 RX ADMIN — OXYCODONE HYDROCHLORIDE AND ACETAMINOPHEN 1 TABLET: 7.5; 325 TABLET ORAL at 12:19

## 2025-04-02 RX ADMIN — INSULIN LISPRO 2 UNITS: 100 INJECTION, SOLUTION INTRAVENOUS; SUBCUTANEOUS at 08:49

## 2025-04-02 RX ADMIN — ISOSORBIDE MONONITRATE 60 MG: 60 TABLET, EXTENDED RELEASE ORAL at 08:48

## 2025-04-02 RX ADMIN — PREDNISONE 40 MG: 20 TABLET ORAL at 08:48

## 2025-04-02 ASSESSMENT — PAIN DESCRIPTION - PAIN TYPE: TYPE: CHRONIC PAIN

## 2025-04-02 ASSESSMENT — PAIN SCALES - GENERAL
PAINLEVEL_OUTOF10: 0
PAINLEVEL_OUTOF10: 0
PAINLEVEL_OUTOF10: 6
PAINLEVEL_OUTOF10: 0

## 2025-04-02 ASSESSMENT — PAIN DESCRIPTION - ONSET: ONSET: ON-GOING

## 2025-04-02 ASSESSMENT — PAIN - FUNCTIONAL ASSESSMENT: PAIN_FUNCTIONAL_ASSESSMENT: PREVENTS OR INTERFERES SOME ACTIVE ACTIVITIES AND ADLS

## 2025-04-02 ASSESSMENT — PAIN DESCRIPTION - DESCRIPTORS: DESCRIPTORS: DISCOMFORT

## 2025-04-02 ASSESSMENT — PAIN DESCRIPTION - FREQUENCY: FREQUENCY: CONTINUOUS

## 2025-04-02 ASSESSMENT — PAIN DESCRIPTION - LOCATION: LOCATION: GENERALIZED

## 2025-04-02 NOTE — PROGRESS NOTES
General Surgery   Daily Progress Note  Patient: Veronica Vargas      CC: DEEP LEFT AXILLARY LYMPH NODE EXCISIONAL BIOPSY    SUBJECTIVE:   Reports she slept well for the first time last night. Pain controlled. States her left armpit has been itching.     ROS:   A 14 point review of systems was conducted, significant findings as noted above. All other systems negative.    OBJECTIVE:    PHYSICAL EXAM:    Vitals:    04/02/25 0034 04/02/25 0457 04/02/25 0544 04/02/25 0547   BP: (!) 180/81 (!) 184/78 (!) 175/66    Pulse: 74 78     Resp: 16 18  18   Temp: 97.7 °F (36.5 °C) 98 °F (36.7 °C)     TempSrc: Oral Oral     SpO2: 94% 95%     Weight:  99.3 kg (218 lb 14.7 oz)     Height:       Exam:General appearance: alert, appears stated age, and cooperative  Lungs: symmetrical chest rise on 4L NC, no accessory muscle use  Heart: appears well perfused  Left axiliary- incisions C/D/I, well approximated with dermabond         ASSESSMENT & PLAN:   This is a  64 year old female s/p DEEP LEFT AXILLARY LYMPH NODE EXCISIONAL BIOPSY     - Ok for diet  -Keep incision clean and dry  -Rest of care, including dispo per primary       Shweta Schwartz, CNP  General Surgery,

## 2025-04-02 NOTE — PROGRESS NOTES
D:  Patient does not have IV access, does not want new IV due to possible discharge today.  She requires PRN Hydralazine.  May we have an order for PO medication due to loss of IV access?  BP is 184/78.  Please advise.  Thank you.  A:  Notified provider,  Garrett Parks MD.  A:  Notified provider patient notes she has not been getting her Carvedilol and Amlodipine in the hospital as she takes at home.  R:  Per provider above, one-time order for Hydralazine 25 mg PO given at 0556.

## 2025-04-02 NOTE — PROGRESS NOTES
D:  Patient does not have IV access, does not want new IV due to possible discharge today.  She requires PRN Hydralazine.  May we have an order for PO medication due to loss of IV access?  Please advise.  Thank you.  A:  Notified provider, Mare Avila MD.

## 2025-04-02 NOTE — PLAN OF CARE
Problem: Chronic Conditions and Co-morbidities  Goal: Patient's chronic conditions and co-morbidity symptoms are monitored and maintained or improved  Outcome: Completed     Problem: Discharge Planning  Goal: Discharge to home or other facility with appropriate resources  Outcome: Completed     Problem: Pain  Goal: Verbalizes/displays adequate comfort level or baseline comfort level  4/2/2025 1443 by Caitie Curry RN  Outcome: Completed     Problem: Safety - Adult  Goal: Free from fall injury  4/2/2025 1443 by Caitie Curry RN  Outcome: Completed         Problem: Metabolic/Fluid and Electrolytes - Adult  Goal: Electrolytes maintained within normal limits  Outcome: Completed  Goal: Glucose maintained within prescribed range  Outcome: Completed     Problem: Skin/Tissue Integrity  Goal: Skin integrity remains intact  Description: 1.  Monitor for areas of redness and/or skin breakdown  2.  Assess vascular access sites hourly  3.  Every 4-6 hours minimum:  Change oxygen saturation probe site  4.  Every 4-6 hours:  If on nasal continuous positive airway pressure, respiratory therapy assess nares and determine need for appliance change or resting period  Outcome: Completed     Problem: Respiratory - Adult  Goal: Achieves optimal ventilation and oxygenation  4/2/2025 1443 by Caitie Curry, RN  Outcome: Completed       Problem: Cardiovascular - Adult  Goal: Absence of cardiac dysrhythmias or at baseline  4/2/2025 1443 by Caitie Curry, RN  Outcome: Completed

## 2025-04-02 NOTE — CARE COORDINATION
Case Management Assessment            Discharge Note                    Date / Time of Note: 4/2/2025 2:42 PM                  Discharge Note Completed by: Sussy France    Patient Name: Veronica Vargas   YOB: 1960  Diagnosis: Lung mass [R91.8]  Acute hypoxic respiratory failure [J96.01]   Date / Time: 3/30/2025 11:37 AM    Current PCP: Ayan Fong MD  Clinic patient: No    Hospitalization in the last 30 days: No       Advance Directives:  Code Status: Full Code  [place] DNR form completed and on chart: Not Indicated    Financial:  Payor: [facility] MEDICARE / Plan: UNITEDHEALTHCARE DUAL COMPLETE / Product Type: *No Product type* /      Pharmacy:    [id number] [address] - P [phone] - F [phone] [address] [facility] [address]  Phone: [phone] Fax: [phone]    [id number] [address] - P [phone] - F [phone] [address] [facility] [address]  Phone: [phone] Fax: [phone]      Assistance purchasing medications?:    Assistance provided by Case Management: None at this time    Does patient want to participate in local refill/ meds to beds program?: Yes    Meds To Beds General Rules:  1. Can ONLY be done Monday- Friday between 8:30am-5pm  2. Prescription(s) must be in pharmacy by 3pm to be filled same day  3.Copy of patient's insurance/ prescription drug card and patient face sheet must be sent along with the prescription(s)  4. Cost of Rx cannot be added to hospital bill. If financial assistance is needed, please contact unit  or ;  or  CANNOT provide pharmacy voucher for patients co-pays  5. Patients can then  the prescription on their way out of the hospital at discharge, or pharmacy can deliver to the bedside if staff is available. (payment due at time of pick-up or delivery - cash, check, or card  accepted)     Able to afford home medications/ co-pay costs: Yes    ADLS:  Current PT AM-PAC Score:   /24  Current OT AM-PAC Score:   /24    DISCHARGE Disposition: Home- No Services Needed    LOC at discharge: Not Applicable  EUNICE Completed: Not Indicated    Notification completed in HENS/PAS?:  Not Applicable    IMM Completed:   Yes, Case management has presented and reviewed IMM letter #2 to the patient and/or family/ POA. Patient and/or family/POA verbalized understanding of their medicare rights and appeal process if needed. Patient and/or family/POA verbalized understanding of DC within 4 hours of signing IMM letter #2. Patient and/or family/POA has signed and placed today's date (4/2) and time (1350) on IMM letter #2 on the the appropriate lines. Patient and/or family/POA, provided copy of letter and they are aware that original copy of IMM letter #2 is available prior to discharge from the paper chart on the unit.  Electronic documentation has been entered into epic for IMM letter #2 and original paper copy has been added to the paper chart at the nurses station.    Presentation of IMM to be given within 4 hours of discharge. Patient understands and agrees with discharge plan.          Transportation:  Transportation PLAN for discharge: family   Mode of Transport: Private Car    Durable Medical Equipment:  DME Provider: Home O2  Equipment obtained during hospitalization: O2    Home Oxygen and Respiratory Equipment:  Oxygen needed at discharge?: Yes  Home Oxygen Company: [employer]formerly [employer] Phone: [phone]   Portable tank available for discharge?: Yes      Additional CM Notes: Patient cleared for discharge.  Patient is A&OX4.  IPTA.  Patient set up with [employer].  Portable tank delivered to patient room  [employer] to deliver concentrator and pulse dose portable O2 later this evening.  CM explained to patient that she will need to call [employer] when she gets home and then they will com and deliver home O2 DME.

## 2025-04-02 NOTE — PLAN OF CARE
Problem: Safety - Adult  Goal: Free from fall injury  Outcome: Progressing  Note:  Remains free from falls, bed in low position, call light in reach, bed alarm monitoring for safety.     Problem: Pain  Goal: Verbalizes/displays adequate comfort level or baseline comfort level  Outcome: Progressing  Note:  PRN Percocet 7.5-325 mg PO at 2033 for generalized chronic pain.     Problem: Respiratory - Adult  Goal: Achieves optimal ventilation and oxygenation  Outcome: Progressing  Note:  O2 94 to 95% on 4L per NC.     Problem: Cardiovascular - Adult  Goal: Absence of cardiac dysrhythmias or at baseline  Outcome: Progressing  Note:  Normal Sinus Rhythm rate 70 at 2200.

## 2025-04-02 NOTE — DISCHARGE INSTRUCTIONS
Follow up with Ayan Fong MD, your PCP on Weds, 4/9 at 0940am. I went ahead and called to coordinate your visit. Please call to confirm your appointment after leaving the hospital.   Follow up with Russell Knowles MD, the pulmonologist within a week of leaving the hospital. You can call and make the appointment after leaving the hospital.   Follow up with TONIA Acuña, the radiation oncologist. You have an appointment with her on 4/72025. Make sure to attend your appointment.   You have 2 upcoming appointments with [facility] on 3/31 and 4/7. Please make sure to attend these appointments.   You are being discharge on prednisone 20 MG tablet. Take 2 tablets by mouth daily for 1 dose  You are being discharge on insulin glargine (Lantus) 100 UNIT/ML injection vial. Inject 5 Units into the skin daily  If new symptoms arise and/or pre-existing ones worsen, please call 911 and go to the nearest emergency department.

## 2025-04-02 NOTE — PROGRESS NOTES
Patient was d/c to home. Patients son and daughter came to pick her up. Went over all d/c instructions, followups and meds. Patient and family both verbalized understanding of all d/c instructions. Removed heart monitor and IV was already removed. Patient left with O2 tank on 4L of o2. Wheeled patient down to main lobby and assisted into the car with her son.

## 2025-04-02 NOTE — PROGRESS NOTES
04/02/25 1302   Resting (Room Air)   SpO2 87   Resting (On O2)   SpO2 87   O2 Device Nasal cannula   O2 Flow Rate (l/min) 1 l/min   During Walk (On O2)   SpO2 87   O2 Device Nasal cannula   O2 Flow Rate (l/min) 3 l/min   Need Additional O2 Flow Rate Rows Yes   O2 Flow Rate (l/min) 4 l/min   O2 Saturation 91   Walk/Assistance Device   (Exercisig in bed.)   Rate of Dyspnea 3   After Walk   Does the Patient Qualify for Home O2 Yes   Liter Flow at Rest 2   Liter Flow on Exertion 5   Does the Patient Need Portable Oxygen Tanks Yes

## 2025-04-02 NOTE — DISCHARGE SUMMARY
INTERNAL MEDICINE DEPARTMENT  DISCHARGE SUMMARY    Patient ID: Veronica Vargas                                             Discharge Date: 4/2/2025   Patient's PCP: Ayan Fong MD                                          Discharge Physician: Luther Horvath MD MD  Admit Date: 3/30/2025   Admitting Physician: Ayan Retana MD    PROBLEMS DURING HOSPITALIZATION:  Present on Admission:   Lung mass   Acute hypoxic respiratory failure   Metastasis to bone (HCC)   Lumbosacral radiculopathy      DISCHARGE DIAGNOSES:  Acute hypoxic respiratory failure (improved)  New Left upper lobe mass  Back Pain (2/2 metastasis)  Chronic CHF  Chronic Normocytic Anemia  Hypertension   Hyperlipidemia    Hospital Course:      HPI:  Ms. Veronica Vargas is a 64 y.o. female with a MHx significant for  chronic diastolic CHF, HTN, HLD, pAfib (not on AC due H/O GI bleed), T2DM, CKD IV, who presented to the ED with SOB and Back pain.      The patient presenting with a 3-month history of progressive shortness of breath (SOB) with dyspnea on exertion requiring rest after walking short distances. She has a history of CHF but denies a COPD diagnosis and is not using supplemental oxygen at baseline.     In January, she fell and has been using a wheelchair for ambulation since that time. Over the past 2 days, her SOB has become more persistent and has been accompanied by wheezing.  She denies chest pain, cough, palpitations, orthopnea, or leg swelling. Additionally, the patient reports night sweats and weight loss of 24 pounds over the last 2 months, accompanied by a decreased appetite. No fever or chills were noted. She reported back pain for the same duration that is severe awakening her from sleep and have radiation to the right leg.      The patient has an extensive smoking history of 1 to 1.5 pack/day for 50 years, though she quit smoking one month ago. Denies alcohol or substance use.     ED Course:  On arrival to the ED, vital signs  general surgery were consulted. The patient underwent a left axillary lymph node biopsy on April 1 by general surgery, and surgical pathology results are pending. A home oxygen evaluation indicated the patient requires 2 L of oxygen at rest and 5 L with exertion. The patient is clinically stable and is now eligible for discharge.  - F/U with PCP in 1 week after discharge   - F/U with pulmonology/ Dr. Knowles  - F/U with radiation oncology on 4/7 for stimulation radiation.       # Back Pain - 2/2 to metastasis  P/w back pain since January after her fall that is severe awakening her from sleep and have radiation to the right leg. CT chest showed osteolytic lesions at T10, CT abdomen Osteolytic lesions in the pelvis compatible with metastatic disease.Most likely 2/2 to metastasis. Radiation oncology consulted and will start radiation after discharge.       # Chronic CHF  (not in excacerbation)   Last echo (2020) LV EF 35-40% Global left ventricular hypokinesis. Mild MR, mod TR. Ischemic workup was deferred due to renal insufficiency and later was refused by the patient.   - follow up with Dr. Pascual (2/2025)  - Repeat Echo (3/31/2025) showed LVEF 55-60% G1DD, Small (<1 cm) pericardial effusion present.      # Chronic Normocytic Anemia:   Reports chronic melena and taking Iron supplements.   2/2 to Obscure GI bleeding (EGD and colonoscopy and capsule was negative) - Hgb on admission 9.8       # Hypertension - continue amlodipine tablet 10 mg daily  # Hyperlipidemia -  continue statin 40 mg nightly         On the basis of discharge, patient reported feeling stable.  The patient was found to not be in any acute distress, with vital signs within normal limits, and no abnormalities on physical examination.  Further, the patient expressed appropriate understanding of, and agreement with, the discharge recommendations, medications and plan.        Physical Exam:  Vitals: BP (!) 166/89   Pulse 79   Temp 98.1 °F (36.7

## 2025-04-02 NOTE — RT PROTOCOL NOTE
RT Inhaler-Nebulizer Bronchodilator Protocol Note    There is a bronchodilator order in the chart from a provider indicating to follow the RT Bronchodilator Protocol and there is an “Initiate RT Inhaler-Nebulizer Bronchodilator Protocol” order as well (see protocol at bottom of note).    CXR Findings:  No results found.    The findings from the last RT Protocol Assessment were as follows:   History Pulmonary Disease: Smoker 15 pack years or more  Respiratory Pattern: Regular pattern and RR 12-20 bpm  Breath Sounds: Slightly diminished and/or crackles  Cough: Strong, spontaneous, non-productive  Indication for Bronchodilator Therapy: On home bronchodilators  Bronchodilator Assessment Score: 3  Will convert to bid.    Aerosolized bronchodilator medication orders have been revised according to the RT Inhaler-Nebulizer Bronchodilator Protocol below.    Respiratory Therapist to perform RT Therapy Protocol Assessment initially then follow the protocol.  Repeat RT Therapy Protocol Assessment PRN for score 0-3 or on second treatment, BID, and PRN for scores above 3.    No Indications - adjust the frequency to every 6 hours PRN wheezing or bronchospasm, if no treatments needed after 48 hours then discontinue using Per Protocol order mode.     If indication present, adjust the RT bronchodilator orders based on the Bronchodilator Assessment Score as indicated below.  Use Inhaler orders unless patient has one or more of the following: on home nebulizer, not able to hold breath for 10 seconds, is not alert and oriented, cannot activate and use MDI correctly, or respiratory rate 25 breaths per minute or more, then use the equivalent nebulizer order(s) with same Frequency and PRN reasons based on the score.  If a patient is on this medication at home then do not decrease Frequency below that used at home.    0-3 - enter or revise RT bronchodilator order(s) to equivalent RT Bronchodilator order with Frequency of every 4 hours PRN

## 2025-04-02 NOTE — PROGRESS NOTES
D:  Please note, patient's BP is elevated at 180/87, P 74.  There are no PRN orders for hypertension.  Please advise.  Thank you.  A:  Notified provider, Mare Avila MD.  R:  Per provider above, \"Ok let me see.\"

## 2025-04-15 ENCOUNTER — TELEPHONE (OUTPATIENT)
Dept: PULMONOLOGY | Age: 65
End: 2025-04-15

## 2025-04-15 ENCOUNTER — OFFICE VISIT (OUTPATIENT)
Dept: PULMONOLOGY | Age: 65
End: 2025-04-15
Payer: MEDICARE

## 2025-04-15 VITALS — HEIGHT: 65 IN | WEIGHT: 219.5 LBS | HEART RATE: 69 BPM | OXYGEN SATURATION: 91 % | BODY MASS INDEX: 36.57 KG/M2

## 2025-04-15 DIAGNOSIS — J96.01 ACUTE HYPOXIC RESPIRATORY FAILURE: ICD-10-CM

## 2025-04-15 DIAGNOSIS — C34.82 MALIGNANT NEOPLASM OF OVERLAPPING SITES OF LEFT LUNG (HCC): ICD-10-CM

## 2025-04-15 DIAGNOSIS — C34.90 LUNG CANCER METASTATIC TO BONE (HCC): ICD-10-CM

## 2025-04-15 DIAGNOSIS — R06.09 DYSPNEA ON EXERTION: Primary | ICD-10-CM

## 2025-04-15 DIAGNOSIS — C79.51 LUNG CANCER METASTATIC TO BONE (HCC): ICD-10-CM

## 2025-04-15 PROCEDURE — G8400 PT W/DXA NO RESULTS DOC: HCPCS | Performed by: INTERNAL MEDICINE

## 2025-04-15 PROCEDURE — G8417 CALC BMI ABV UP PARAM F/U: HCPCS | Performed by: INTERNAL MEDICINE

## 2025-04-15 PROCEDURE — 1036F TOBACCO NON-USER: CPT | Performed by: INTERNAL MEDICINE

## 2025-04-15 PROCEDURE — 1111F DSCHRG MED/CURRENT MED MERGE: CPT | Performed by: INTERNAL MEDICINE

## 2025-04-15 PROCEDURE — 1123F ACP DISCUSS/DSCN MKR DOCD: CPT | Performed by: INTERNAL MEDICINE

## 2025-04-15 PROCEDURE — 1159F MED LIST DOCD IN RCRD: CPT | Performed by: INTERNAL MEDICINE

## 2025-04-15 PROCEDURE — 3017F COLORECTAL CA SCREEN DOC REV: CPT | Performed by: INTERNAL MEDICINE

## 2025-04-15 PROCEDURE — 99214 OFFICE O/P EST MOD 30 MIN: CPT | Performed by: INTERNAL MEDICINE

## 2025-04-15 PROCEDURE — 1090F PRES/ABSN URINE INCON ASSESS: CPT | Performed by: INTERNAL MEDICINE

## 2025-04-15 PROCEDURE — G8427 DOCREV CUR MEDS BY ELIG CLIN: HCPCS | Performed by: INTERNAL MEDICINE

## 2025-04-15 RX ORDER — FLUTICASONE FUROATE, UMECLIDINIUM BROMIDE AND VILANTEROL TRIFENATATE 100; 62.5; 25 UG/1; UG/1; UG/1
1 POWDER RESPIRATORY (INHALATION) DAILY
Qty: 2 EACH | Refills: 0 | Status: SHIPPED | COMMUNITY
Start: 2025-04-15

## 2025-04-15 NOTE — TELEPHONE ENCOUNTER
Please sign for the 2 samples of Trelegy given at todays office visit      Requested Prescriptions     Pending Prescriptions Disp Refills    fluticasone-umeclidin-vilant (TRELEGY ELLIPTA) 100-62.5-25 MCG/ACT AEPB inhaler 2 each 0     Sig: Inhale 1 puff into the lungs daily -  2T6F  6/30/2026  [facility]

## 2025-04-15 NOTE — PROGRESS NOTES
Veronica Vargas (:  1960) is a 64 y.o. 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  She is undergoing palliative radiation therapy for bony metastases.  She has met with Dr. Waterhouse to start a treatment plan for medical oncology.  Shortness of breath very likely relates to an element of chronic obstructive lung disease.  This has never been evaluated.  Pulmonary function testing at this time is more than I would expect her to endure, given the multiple other tests and treatments she is starting.  We were treating her empirically with Trelegy inhaler 1 inhalation daily.  She is given samples in the office and given instruction and appropriate use.  We will assess her response after 4 weeks    Return in about 4 weeks (around 2025).       Subjective   HPI   Veronica Vargas returns for follow-up after hospitalization 2 weeks ago.  She had multiple complaints, including back pain and fatigue, was found to have significant hypoxemia.  Diagnosis of a large cell neuroendocrine cancer was made after left axillary lymph node excisional biopsy.  She has been receiving palliative radiation to lumbar spine and pelvis for the past week.  She met Dr. Waterhouse yesterday to establish medical oncology care.  She is maintained on oxygen at 2 L/min.  She feels this helps her endurance.  Notably oxyhemoglobin saturation is just adequate with that flow rate.  She has shortness of breath with

## 2025-04-16 ENCOUNTER — HOSPITAL ENCOUNTER (OUTPATIENT)
Dept: MRI IMAGING | Age: 65
Discharge: HOME | End: 2025-04-16
Attending: RADIOLOGY
Payer: MEDICARE

## 2025-04-16 DIAGNOSIS — C34.12 MALIGNANT NEOPLASM OF UPPER LOBE BRONCHUS, LEFT (HCC): ICD-10-CM

## 2025-04-16 PROCEDURE — 70553 MRI BRAIN STEM W/O & W/DYE: CPT

## 2025-04-16 PROCEDURE — 6360000004 HC RX CONTRAST MEDICATION: Performed by: RADIOLOGY

## 2025-04-16 PROCEDURE — A9576 INJ PROHANCE MULTIPACK: HCPCS | Performed by: RADIOLOGY

## 2025-04-16 RX ADMIN — GADOTERIDOL 20 ML: 279.3 INJECTION, SOLUTION INTRAVENOUS at 10:42

## 2025-04-22 NOTE — PROGRESS NOTES
Called patient about procedure. Told to be here at 0730 for procedure at 0900. Must be NPO after midnight but can take morning medication with sips of water. Patient stated they are not on any blood thinners. Told to have a responsible adult with them to take them home and stay with them afterwards, if they do not get admitted to hospital. Also, to bring a current list of medications. No other questions or concerns.

## 2025-04-23 ENCOUNTER — HOSPITAL ENCOUNTER (OUTPATIENT)
Dept: INTERVENTIONAL RADIOLOGY/VASCULAR | Age: 65
Discharge: HOME | End: 2025-04-25
Attending: INTERNAL MEDICINE
Payer: MEDICARE

## 2025-04-23 VITALS
BODY MASS INDEX: 35.99 KG/M2 | OXYGEN SATURATION: 93 % | SYSTOLIC BLOOD PRESSURE: 134 MMHG | TEMPERATURE: 98.1 F | HEIGHT: 65 IN | DIASTOLIC BLOOD PRESSURE: 68 MMHG | HEART RATE: 69 BPM | RESPIRATION RATE: 17 BRPM | WEIGHT: 216 LBS

## 2025-04-23 DIAGNOSIS — C34.92 NON-SMALL CELL LUNG CANCER, LEFT (HCC): ICD-10-CM

## 2025-04-23 LAB
DEPRECATED RDW RBC AUTO: 17.6 % (ref 12.4–15.4)
ECHO BSA: 2.12 M2
HCT VFR BLD AUTO: 24 % (ref 36–48)
HGB BLD-MCNC: 7.9 G/DL (ref 12–16)
INR PPP: 1.09 (ref 0.85–1.15)
MCH RBC QN AUTO: 28.9 PG (ref 26–34)
MCHC RBC AUTO-ENTMCNC: 33 G/DL (ref 31–36)
MCV RBC AUTO: 87.6 FL (ref 80–100)
PLATELET # BLD AUTO: 243 K/UL (ref 135–450)
PMV BLD AUTO: 7.5 FL (ref 5–10.5)
PROTHROMBIN TIME: 14.3 SEC (ref 11.9–14.9)
RBC # BLD AUTO: 2.74 M/UL (ref 4–5.2)
WBC # BLD AUTO: 6.6 K/UL (ref 4–11)

## 2025-04-23 PROCEDURE — 6360000002 HC RX W HCPCS: Performed by: STUDENT IN AN ORGANIZED HEALTH CARE EDUCATION/TRAINING PROGRAM

## 2025-04-23 PROCEDURE — C1788 PORT, INDWELLING, IMP: HCPCS

## 2025-04-23 PROCEDURE — 99152 MOD SED SAME PHYS/QHP 5/>YRS: CPT

## 2025-04-23 PROCEDURE — 7100000010 HC PHASE II RECOVERY - FIRST 15 MIN

## 2025-04-23 PROCEDURE — 76937 US GUIDE VASCULAR ACCESS: CPT

## 2025-04-23 PROCEDURE — 85610 PROTHROMBIN TIME: CPT

## 2025-04-23 PROCEDURE — 2500000003 HC RX 250 WO HCPCS: Performed by: STUDENT IN AN ORGANIZED HEALTH CARE EDUCATION/TRAINING PROGRAM

## 2025-04-23 PROCEDURE — 85027 COMPLETE CBC AUTOMATED: CPT

## 2025-04-23 PROCEDURE — 7100000011 HC PHASE II RECOVERY - ADDTL 15 MIN

## 2025-04-23 PROCEDURE — C1894 INTRO/SHEATH, NON-LASER: HCPCS

## 2025-04-23 RX ORDER — LIDOCAINE HYDROCHLORIDE 10 MG/ML
INJECTION, SOLUTION EPIDURAL; INFILTRATION; INTRACAUDAL; PERINEURAL PRN
Status: COMPLETED | OUTPATIENT
Start: 2025-04-23 | End: 2025-04-23

## 2025-04-23 RX ORDER — FENTANYL CITRATE 50 UG/ML
INJECTION, SOLUTION INTRAMUSCULAR; INTRAVENOUS PRN
Status: COMPLETED | OUTPATIENT
Start: 2025-04-23 | End: 2025-04-23

## 2025-04-23 RX ORDER — MIDAZOLAM HYDROCHLORIDE 1 MG/ML
INJECTION, SOLUTION INTRAMUSCULAR; INTRAVENOUS PRN
Status: COMPLETED | OUTPATIENT
Start: 2025-04-23 | End: 2025-04-23

## 2025-04-23 RX ADMIN — FENTANYL CITRATE 25 MCG: 50 INJECTION, SOLUTION INTRAMUSCULAR; INTRAVENOUS at 09:15

## 2025-04-23 RX ADMIN — CEFAZOLIN SODIUM 1000 MG: 1 POWDER, FOR SOLUTION INTRAMUSCULAR; INTRAVENOUS at 09:03

## 2025-04-23 RX ADMIN — LIDOCAINE HYDROCHLORIDE 3 ML: 10 INJECTION, SOLUTION EPIDURAL; INFILTRATION; INTRACAUDAL; PERINEURAL at 09:15

## 2025-04-23 RX ADMIN — MIDAZOLAM HYDROCHLORIDE 1 MG: 1 INJECTION, SOLUTION INTRAMUSCULAR; INTRAVENOUS at 09:15

## 2025-04-23 RX ADMIN — LIDOCAINE HYDROCHLORIDE 3 ML: 10 INJECTION, SOLUTION EPIDURAL; INFILTRATION; INTRACAUDAL; PERINEURAL at 09:22

## 2025-04-23 RX ADMIN — LIDOCAINE HYDROCHLORIDE 3 ML: 10 INJECTION, SOLUTION EPIDURAL; INFILTRATION; INTRACAUDAL; PERINEURAL at 09:19

## 2025-04-23 NOTE — PROGRESS NOTES
Cath Lab Pre Procedure Flowsheet    Plan of Care:     Hemodynamics and cardiac rhythm will remain stable.   Comfort level will be maintained.   Respiratory function will remain adequate.   Pt/family will verbalize understanding of the procedure.   Procedure will be tolerated without complications.   Patient will recover from procedure without complications.   ID armband on patient and identification verified.   Informed consent obtained.   Non invasive blood pressure cuff applied, monitoring initiated.   EKG pads and pulse oximeter applied, monitoring initiated.   Instructions given. Patient and / or family verbalize understanding.   H&P will be documented by physician in Epic.     Pre-procedure:    NPO Status: Pt has been NPO since midnight. .    Contrast / IV Dye Allergy: None - CKD 4    Pregnancy Test: No.    Prep Sites: Chest    Ketan's Test:    Pulses:  Bilat Radial 2     Anticoagulants: None.     Antiplatelets: None.     Chief Complaint:   Lung Cancer    Diabetic: Yes, Oral and Insulin Combination Treatment    Pre EKG Rhythm: Sinus Rhythm    Pre SBP: 112    IV access: PIV #22 RAC    Pre-procedure blood work collected by:  JOHNNY RN    NIH Scale: N/A

## 2025-04-23 NOTE — DISCHARGE INSTRUCTIONS
[facility]  Cardiovascular Special Procedures  IMPLANTABLE VENOUS ACCESS DEVICE          Patient Information:     Maintain dressing after the procedure for 24 - 48 hours, then remove dressing. Do not use any ointment. If there is any leakage at the incision site, notify your doctor.    Keep site clean and dry for 7 days and observe for any signs of infection.      Bruising and mild discomfort are expected. You may apply an ice bag to the incision area to minimize bruising, discomfort, and swelling.    Contact your physician who placed the Port for any bleeding or extreme pain. Other symptoms to report are as follows:     Fever.     Skin warm to touch.     Numbness or weakness.     Swelling of neck or arm.     Redness or Tenderness along the portal site and/or the catheter tract.     Drainage from the portal site, or if dressing is damp or saturated.     Refrain from heavy lifting for greater than 10 pounds for 10-14 days.  This is important while the incision is healing.  Strenuous activities and exercise should be approached gradually.    You may shower as long as you keep the incision dry for the first 4 days.              The incision should not be immersed in water until it is completely healed. No baths or swimming until healed.    Most of the sutures will be absorbed over the next few weeks.  If you have                     sutures that need to be removed, we will arrange this with you. If you can see            any sutures 4 weeks after the port was placed, please call us and we will  remove them     Caring for the Device:    When not in use, your nurse will flush the catheter monthly with a heparinized saline solution to prevent the catheter from occluding. When in use, regular needle changes and cleaning of the skin around the portal are necessary and will be performed by your nurse.       You may contact [email or web address] for any questions or problems that may occur at [phone]

## 2025-04-28 PROBLEM — C34.12 CANCER OF UPPER LOBE OF LEFT LUNG (HCC): Status: ACTIVE | Noted: 2025-04-28

## 2025-04-28 RX ORDER — SODIUM CHLORIDE 9 MG/ML
INJECTION, SOLUTION INTRAVENOUS CONTINUOUS
OUTPATIENT
Start: 2025-04-28

## 2025-04-28 RX ORDER — SODIUM CHLORIDE 9 MG/ML
25 INJECTION, SOLUTION INTRAVENOUS PRN
OUTPATIENT
Start: 2025-04-28

## 2025-04-28 RX ORDER — SODIUM CHLORIDE 9 MG/ML
20 INJECTION, SOLUTION INTRAVENOUS CONTINUOUS
OUTPATIENT
Start: 2025-04-28

## 2025-04-28 RX ORDER — ALBUTEROL SULFATE 90 UG/1
4 INHALANT RESPIRATORY (INHALATION) PRN
OUTPATIENT
Start: 2025-04-28

## 2025-04-28 RX ORDER — ACETAMINOPHEN 325 MG/1
650 TABLET ORAL
OUTPATIENT
Start: 2025-04-28

## 2025-04-28 RX ORDER — EPINEPHRINE 1 MG/ML
0.3 INJECTION, SOLUTION INTRAMUSCULAR; SUBCUTANEOUS PRN
OUTPATIENT
Start: 2025-04-28

## 2025-04-28 RX ORDER — ONDANSETRON 2 MG/ML
8 INJECTION INTRAMUSCULAR; INTRAVENOUS
OUTPATIENT
Start: 2025-04-28

## 2025-04-28 RX ORDER — SODIUM CHLORIDE 0.9 % (FLUSH) 0.9 %
5-40 SYRINGE (ML) INJECTION PRN
OUTPATIENT
Start: 2025-04-28

## 2025-04-28 RX ORDER — HYDROCORTISONE SODIUM SUCCINATE 100 MG/2ML
100 INJECTION INTRAMUSCULAR; INTRAVENOUS
OUTPATIENT
Start: 2025-04-28

## 2025-04-28 RX ORDER — DIPHENHYDRAMINE HYDROCHLORIDE 50 MG/ML
50 INJECTION, SOLUTION INTRAMUSCULAR; INTRAVENOUS
OUTPATIENT
Start: 2025-04-28

## 2025-04-29 ENCOUNTER — HOSPITAL ENCOUNTER (OUTPATIENT)
Dept: ONCOLOGY | Age: 65
Setting detail: INFUSION SERIES
Discharge: HOME | End: 2025-04-29
Payer: MEDICARE

## 2025-04-29 VITALS
TEMPERATURE: 98.1 F | OXYGEN SATURATION: 90 % | SYSTOLIC BLOOD PRESSURE: 122 MMHG | RESPIRATION RATE: 18 BRPM | HEART RATE: 75 BPM | DIASTOLIC BLOOD PRESSURE: 69 MMHG

## 2025-04-29 DIAGNOSIS — C34.90 LUNG CANCER METASTATIC TO BONE (HCC): ICD-10-CM

## 2025-04-29 DIAGNOSIS — C79.51 LUNG CANCER METASTATIC TO BONE (HCC): ICD-10-CM

## 2025-04-29 DIAGNOSIS — C34.12 CANCER OF UPPER LOBE OF LEFT LUNG (HCC): Primary | ICD-10-CM

## 2025-04-29 LAB
ABO + RH BLD: NORMAL
ABO + RH BLD: NORMAL
ABO/RH: NORMAL
ANTIBODY SCREEN: NORMAL
BLOOD BANK DISPENSE STATUS: NORMAL
BLOOD BANK PRODUCT CODE: NORMAL
BPU ID: NORMAL
DESCRIPTION BLOOD BANK: NORMAL

## 2025-04-29 PROCEDURE — 86850 RBC ANTIBODY SCREEN: CPT

## 2025-04-29 PROCEDURE — P9016 RBC LEUKOCYTES REDUCED: HCPCS

## 2025-04-29 PROCEDURE — 86901 BLOOD TYPING SEROLOGIC RH(D): CPT

## 2025-04-29 PROCEDURE — 36430 TRANSFUSION BLD/BLD COMPNT: CPT

## 2025-04-29 PROCEDURE — 6370000000 HC RX 637 (ALT 250 FOR IP): Performed by: INTERNAL MEDICINE

## 2025-04-29 PROCEDURE — 36415 COLL VENOUS BLD VENIPUNCTURE: CPT

## 2025-04-29 PROCEDURE — 86900 BLOOD TYPING SEROLOGIC ABO: CPT

## 2025-04-29 PROCEDURE — 86923 COMPATIBILITY TEST ELECTRIC: CPT

## 2025-04-29 RX ORDER — DIPHENHYDRAMINE HYDROCHLORIDE 50 MG/ML
50 INJECTION, SOLUTION INTRAMUSCULAR; INTRAVENOUS
OUTPATIENT
Start: 2025-04-29

## 2025-04-29 RX ORDER — ACETAMINOPHEN 325 MG/1
650 TABLET ORAL
OUTPATIENT
Start: 2025-04-29

## 2025-04-29 RX ORDER — EPINEPHRINE 1 MG/ML
0.3 INJECTION, SOLUTION INTRAMUSCULAR; SUBCUTANEOUS PRN
OUTPATIENT
Start: 2025-04-29

## 2025-04-29 RX ORDER — DIPHENHYDRAMINE HCL 25 MG
25 TABLET ORAL ONCE
Status: CANCELLED | OUTPATIENT
Start: 2025-04-29 | End: 2025-04-29

## 2025-04-29 RX ORDER — SODIUM CHLORIDE 0.9 % (FLUSH) 0.9 %
5-40 SYRINGE (ML) INJECTION PRN
OUTPATIENT
Start: 2025-04-29

## 2025-04-29 RX ORDER — ALBUTEROL SULFATE 90 UG/1
4 INHALANT RESPIRATORY (INHALATION) PRN
OUTPATIENT
Start: 2025-04-29

## 2025-04-29 RX ORDER — ACETAMINOPHEN 325 MG/1
650 TABLET ORAL ONCE
Status: CANCELLED | OUTPATIENT
Start: 2025-04-29 | End: 2025-04-29

## 2025-04-29 RX ORDER — ONDANSETRON 2 MG/ML
8 INJECTION INTRAMUSCULAR; INTRAVENOUS
OUTPATIENT
Start: 2025-04-29

## 2025-04-29 RX ORDER — DIPHENHYDRAMINE HCL 25 MG
25 TABLET ORAL ONCE
Status: COMPLETED | OUTPATIENT
Start: 2025-04-29 | End: 2025-04-29

## 2025-04-29 RX ORDER — HYDROCORTISONE SODIUM SUCCINATE 100 MG/2ML
100 INJECTION INTRAMUSCULAR; INTRAVENOUS
OUTPATIENT
Start: 2025-04-29

## 2025-04-29 RX ORDER — SODIUM CHLORIDE 9 MG/ML
25 INJECTION, SOLUTION INTRAVENOUS PRN
OUTPATIENT
Start: 2025-04-29

## 2025-04-29 RX ORDER — ACETAMINOPHEN 325 MG/1
650 TABLET ORAL ONCE
Status: COMPLETED | OUTPATIENT
Start: 2025-04-29 | End: 2025-04-29

## 2025-04-29 RX ORDER — SODIUM CHLORIDE 9 MG/ML
INJECTION, SOLUTION INTRAVENOUS CONTINUOUS
OUTPATIENT
Start: 2025-04-29

## 2025-04-29 RX ORDER — SODIUM CHLORIDE 9 MG/ML
20 INJECTION, SOLUTION INTRAVENOUS CONTINUOUS
OUTPATIENT
Start: 2025-04-29

## 2025-04-29 RX ADMIN — DIPHENHYDRAMINE HCL 25 MG: 25 TABLET ORAL at 13:23

## 2025-04-29 RX ADMIN — ACETAMINOPHEN 650 MG: 325 TABLET ORAL at 13:23

## 2025-04-29 NOTE — PROGRESS NOTES
Pt seen at [facility] OPO today for  Packed red blood cell transfusion  for above lab values per order. Blood product transfusion informed consent note current Pre-medications administered as ordered. Transfused per policy. Pt tolerated transfusion well and without incident.  Pt verbalizes understanding of discharge instructions.  Discharged to home per wheelchair with family.

## 2025-05-20 ENCOUNTER — HOSPITAL ENCOUNTER (OUTPATIENT)
Dept: ONCOLOGY | Age: 65
Setting detail: INFUSION SERIES
Discharge: HOME | End: 2025-05-20
Payer: MEDICARE

## 2025-05-20 DIAGNOSIS — C79.51 LUNG CANCER METASTATIC TO BONE (HCC): ICD-10-CM

## 2025-05-20 DIAGNOSIS — C34.12 CANCER OF UPPER LOBE OF LEFT LUNG (HCC): Primary | ICD-10-CM

## 2025-05-20 DIAGNOSIS — C34.90 LUNG CANCER METASTATIC TO BONE (HCC): ICD-10-CM

## 2025-05-20 LAB
ABO/RH: NORMAL
ANTIBODY SCREEN: NORMAL
BLD GP AB SCN SERPL QL: NORMAL
DAT POLY-SP REAG RBC QL: NORMAL

## 2025-05-20 PROCEDURE — 86880 COOMBS TEST DIRECT: CPT

## 2025-05-20 PROCEDURE — 86901 BLOOD TYPING SEROLOGIC RH(D): CPT

## 2025-05-20 PROCEDURE — 86870 RBC ANTIBODY IDENTIFICATION: CPT

## 2025-05-20 PROCEDURE — 86900 BLOOD TYPING SEROLOGIC ABO: CPT

## 2025-05-20 PROCEDURE — 86850 RBC ANTIBODY SCREEN: CPT

## 2025-05-20 PROCEDURE — 36591 DRAW BLOOD OFF VENOUS DEVICE: CPT

## 2025-05-20 RX ORDER — DIPHENHYDRAMINE HYDROCHLORIDE 50 MG/ML
50 INJECTION, SOLUTION INTRAMUSCULAR; INTRAVENOUS
OUTPATIENT
Start: 2025-05-20

## 2025-05-20 RX ORDER — SODIUM CHLORIDE 9 MG/ML
20 INJECTION, SOLUTION INTRAVENOUS CONTINUOUS
OUTPATIENT
Start: 2025-05-20

## 2025-05-20 RX ORDER — ALBUTEROL SULFATE 90 UG/1
4 INHALANT RESPIRATORY (INHALATION) PRN
OUTPATIENT
Start: 2025-05-20

## 2025-05-20 RX ORDER — ACETAMINOPHEN 325 MG/1
650 TABLET ORAL ONCE
Status: CANCELLED | OUTPATIENT
Start: 2025-05-20 | End: 2025-05-20

## 2025-05-20 RX ORDER — SODIUM CHLORIDE 9 MG/ML
INJECTION, SOLUTION INTRAVENOUS CONTINUOUS
OUTPATIENT
Start: 2025-05-20

## 2025-05-20 RX ORDER — ACETAMINOPHEN 325 MG/1
650 TABLET ORAL
OUTPATIENT
Start: 2025-05-20

## 2025-05-20 RX ORDER — HYDROCORTISONE SODIUM SUCCINATE 100 MG/2ML
100 INJECTION INTRAMUSCULAR; INTRAVENOUS
OUTPATIENT
Start: 2025-05-20

## 2025-05-20 RX ORDER — DIPHENHYDRAMINE HCL 25 MG
25 TABLET ORAL ONCE
Status: DISCONTINUED | OUTPATIENT
Start: 2025-05-20 | End: 2025-05-21 | Stop reason: HOSPADM

## 2025-05-20 RX ORDER — ACETAMINOPHEN 325 MG/1
650 TABLET ORAL ONCE
Status: DISCONTINUED | OUTPATIENT
Start: 2025-05-20 | End: 2025-05-21 | Stop reason: HOSPADM

## 2025-05-20 RX ORDER — SODIUM CHLORIDE 0.9 % (FLUSH) 0.9 %
5-40 SYRINGE (ML) INJECTION PRN
OUTPATIENT
Start: 2025-05-20

## 2025-05-20 RX ORDER — DIPHENHYDRAMINE HCL 25 MG
25 TABLET ORAL ONCE
Status: CANCELLED | OUTPATIENT
Start: 2025-05-20 | End: 2025-05-20

## 2025-05-20 RX ORDER — SODIUM CHLORIDE 9 MG/ML
25 INJECTION, SOLUTION INTRAVENOUS PRN
OUTPATIENT
Start: 2025-05-20

## 2025-05-20 RX ORDER — EPINEPHRINE 1 MG/ML
0.3 INJECTION, SOLUTION INTRAMUSCULAR; SUBCUTANEOUS PRN
OUTPATIENT
Start: 2025-05-20

## 2025-05-20 RX ORDER — ONDANSETRON 2 MG/ML
8 INJECTION INTRAMUSCULAR; INTRAVENOUS
OUTPATIENT
Start: 2025-05-20

## 2025-05-20 NOTE — PROGRESS NOTES
Type and Screen drawn with additional tubes to be sent to [facility] for work up. Will transfuse 1uPRBC tomorrow afternoon.

## 2025-05-21 ENCOUNTER — HOSPITAL ENCOUNTER (OUTPATIENT)
Dept: ONCOLOGY | Age: 65
Setting detail: INFUSION SERIES
Discharge: HOME | End: 2025-05-21
Payer: MEDICARE

## 2025-05-21 VITALS
HEART RATE: 73 BPM | TEMPERATURE: 98.1 F | RESPIRATION RATE: 18 BRPM | OXYGEN SATURATION: 99 % | DIASTOLIC BLOOD PRESSURE: 70 MMHG | SYSTOLIC BLOOD PRESSURE: 174 MMHG

## 2025-05-21 DIAGNOSIS — C34.90 LUNG CANCER METASTATIC TO BONE (HCC): ICD-10-CM

## 2025-05-21 DIAGNOSIS — C79.51 LUNG CANCER METASTATIC TO BONE (HCC): ICD-10-CM

## 2025-05-21 DIAGNOSIS — C34.12 CANCER OF UPPER LOBE OF LEFT LUNG (HCC): Primary | ICD-10-CM

## 2025-05-21 LAB — BLOOD GROUP ANTIBODIES SERPL: NORMAL

## 2025-05-21 PROCEDURE — P9016 RBC LEUKOCYTES REDUCED: HCPCS

## 2025-05-21 PROCEDURE — 6370000000 HC RX 637 (ALT 250 FOR IP): Performed by: INTERNAL MEDICINE

## 2025-05-21 PROCEDURE — 36430 TRANSFUSION BLD/BLD COMPNT: CPT

## 2025-05-21 RX ORDER — ACETAMINOPHEN 325 MG/1
650 TABLET ORAL ONCE
Status: COMPLETED | OUTPATIENT
Start: 2025-05-21 | End: 2025-05-21

## 2025-05-21 RX ORDER — SODIUM CHLORIDE 9 MG/ML
20 INJECTION, SOLUTION INTRAVENOUS CONTINUOUS
OUTPATIENT
Start: 2025-05-21

## 2025-05-21 RX ORDER — DIPHENHYDRAMINE HYDROCHLORIDE 50 MG/ML
50 INJECTION, SOLUTION INTRAMUSCULAR; INTRAVENOUS
OUTPATIENT
Start: 2025-05-21

## 2025-05-21 RX ORDER — SODIUM CHLORIDE 0.9 % (FLUSH) 0.9 %
5-40 SYRINGE (ML) INJECTION PRN
OUTPATIENT
Start: 2025-05-21

## 2025-05-21 RX ORDER — EPINEPHRINE 1 MG/ML
0.3 INJECTION, SOLUTION INTRAMUSCULAR; SUBCUTANEOUS PRN
OUTPATIENT
Start: 2025-05-21

## 2025-05-21 RX ORDER — ACETAMINOPHEN 325 MG/1
650 TABLET ORAL ONCE
Status: CANCELLED | OUTPATIENT
Start: 2025-05-21 | End: 2025-05-21

## 2025-05-21 RX ORDER — ALBUTEROL SULFATE 90 UG/1
4 INHALANT RESPIRATORY (INHALATION) PRN
OUTPATIENT
Start: 2025-05-21

## 2025-05-21 RX ORDER — HYDROCORTISONE SODIUM SUCCINATE 100 MG/2ML
100 INJECTION INTRAMUSCULAR; INTRAVENOUS
OUTPATIENT
Start: 2025-05-21

## 2025-05-21 RX ORDER — DIPHENHYDRAMINE HCL 25 MG
25 TABLET ORAL ONCE
Status: CANCELLED | OUTPATIENT
Start: 2025-05-21 | End: 2025-05-21

## 2025-05-21 RX ORDER — ACETAMINOPHEN 325 MG/1
650 TABLET ORAL
OUTPATIENT
Start: 2025-05-21

## 2025-05-21 RX ORDER — ONDANSETRON 2 MG/ML
8 INJECTION INTRAMUSCULAR; INTRAVENOUS
OUTPATIENT
Start: 2025-05-21

## 2025-05-21 RX ORDER — DIPHENHYDRAMINE HCL 25 MG
25 TABLET ORAL ONCE
Status: COMPLETED | OUTPATIENT
Start: 2025-05-21 | End: 2025-05-21

## 2025-05-21 RX ORDER — SODIUM CHLORIDE 9 MG/ML
INJECTION, SOLUTION INTRAVENOUS CONTINUOUS
OUTPATIENT
Start: 2025-05-21

## 2025-05-21 RX ORDER — SODIUM CHLORIDE 9 MG/ML
25 INJECTION, SOLUTION INTRAVENOUS PRN
OUTPATIENT
Start: 2025-05-21

## 2025-05-21 RX ADMIN — DIPHENHYDRAMINE HCL 25 MG: 25 TABLET ORAL at 12:15

## 2025-05-21 RX ADMIN — ACETAMINOPHEN 650 MG: 325 TABLET ORAL at 12:15

## 2025-05-21 NOTE — PROGRESS NOTES
Patients HGB 5/19/25: 6.4  Pt seen at [facility] O[facility] today for  Packed red blood cell transfusion  for above lab values per order. Blood product transfusion informed consent note current Pre-medications administered as ordered. Transfused per policy. Pt tolerated transfusion well and without incident.  Pt verbalizes understanding of discharge instructions.  Discharged to home per wheelchair with family.

## 2025-05-22 ENCOUNTER — OFFICE VISIT (OUTPATIENT)
Dept: PULMONOLOGY | Age: 65
End: 2025-05-22
Payer: MEDICARE

## 2025-05-22 VITALS
BODY MASS INDEX: 36.06 KG/M2 | WEIGHT: 216.4 LBS | OXYGEN SATURATION: 95 % | SYSTOLIC BLOOD PRESSURE: 122 MMHG | HEART RATE: 67 BPM | DIASTOLIC BLOOD PRESSURE: 60 MMHG | HEIGHT: 65 IN

## 2025-05-22 DIAGNOSIS — C34.82 MALIGNANT NEOPLASM OF OVERLAPPING SITES OF LEFT LUNG (HCC): ICD-10-CM

## 2025-05-22 DIAGNOSIS — J44.9 CHRONIC OBSTRUCTIVE PULMONARY DISEASE, UNSPECIFIED COPD TYPE (HCC): Primary | ICD-10-CM

## 2025-05-22 PROCEDURE — 3078F DIAST BP <80 MM HG: CPT | Performed by: INTERNAL MEDICINE

## 2025-05-22 PROCEDURE — 1036F TOBACCO NON-USER: CPT | Performed by: INTERNAL MEDICINE

## 2025-05-22 PROCEDURE — 1123F ACP DISCUSS/DSCN MKR DOCD: CPT | Performed by: INTERNAL MEDICINE

## 2025-05-22 PROCEDURE — 3017F COLORECTAL CA SCREEN DOC REV: CPT | Performed by: INTERNAL MEDICINE

## 2025-05-22 PROCEDURE — G8427 DOCREV CUR MEDS BY ELIG CLIN: HCPCS | Performed by: INTERNAL MEDICINE

## 2025-05-22 PROCEDURE — 99214 OFFICE O/P EST MOD 30 MIN: CPT | Performed by: INTERNAL MEDICINE

## 2025-05-22 PROCEDURE — 1090F PRES/ABSN URINE INCON ASSESS: CPT | Performed by: INTERNAL MEDICINE

## 2025-05-22 PROCEDURE — G8417 CALC BMI ABV UP PARAM F/U: HCPCS | Performed by: INTERNAL MEDICINE

## 2025-05-22 PROCEDURE — 3074F SYST BP LT 130 MM HG: CPT | Performed by: INTERNAL MEDICINE

## 2025-05-22 PROCEDURE — 1159F MED LIST DOCD IN RCRD: CPT | Performed by: INTERNAL MEDICINE

## 2025-05-22 PROCEDURE — G8400 PT W/DXA NO RESULTS DOC: HCPCS | Performed by: INTERNAL MEDICINE

## 2025-05-22 PROCEDURE — 3023F SPIROM DOC REV: CPT | Performed by: INTERNAL MEDICINE

## 2025-05-22 RX ORDER — FLUTICASONE FUROATE, UMECLIDINIUM BROMIDE AND VILANTEROL TRIFENATATE 100; 62.5; 25 UG/1; UG/1; UG/1
1 POWDER RESPIRATORY (INHALATION) DAILY
Qty: 1 EACH | Refills: 11 | Status: SHIPPED | OUTPATIENT
Start: 2025-05-22

## 2025-05-22 NOTE — PROGRESS NOTES
Veronica Vargas (:  1960) is a 65 y.o. female,Established patient, here for evaluation of the following chief complaint(s):  1 Month Follow-Up (Acute hypoxia resp fx, ca LL)         Assessment & Plan  Chronic obstructive pulmonary disease, unspecified COPD type (HCC)       Orders:    DME Order for Pulse Ox as OP    Malignant neoplasm of overlapping sites of left lung (HCC)            COPD symptoms have improved significantly with use of ICS/LABA/LAMA inhaler.  Continuing treatment with Trelegy inhaler.  Continuing on oxygen at 2 L/min, current O2 saturation is 96%.  We will monitor this, prescribing pulse oximeter for use at home.  She has embarked on treatment for large cell lung cancer, with metastatic lesions in extrathoracic lymph nodes and bone.  She has had 2 rounds of initial chemotherapy with immunotherapy.  She is tolerating this well so far.    Return in about 3 months (around 2025).       Subjective   HPI   Veronica Vargas returns for follow-up for COPD and new diagnosis of lung cancer.  We started empiric treatment with Trelegy inhaler at her last visit and she reports significant improvement in shortness of breath, chest tightness, and decreasing cough.  She continues on oxygen at 2 L/min, although sometimes will take this off at home, and does not observe any decline in breathing capacity or energy level.  She has required RBC transfusions after chemotherapy however.  She has now had 2 rounds of chemo therapy with immunotherapy.           Objective   Physical Exam  Vitals reviewed.   Constitutional:       Appearance: She is well-developed. She is obese.   HENT:      Head: Normocephalic and atraumatic.      Mouth/Throat:      Mouth: Mucous membranes are moist.      Pharynx: Oropharynx is clear. No oropharyngeal exudate or posterior oropharyngeal erythema.   Eyes:      General: No scleral icterus.        Right eye: No discharge.         Left eye: No discharge.   Neck:      Thyroid: No

## 2025-06-09 ENCOUNTER — HOSPITAL ENCOUNTER (OUTPATIENT)
Dept: ONCOLOGY | Age: 65
Setting detail: INFUSION SERIES
Discharge: HOME | End: 2025-06-09
Payer: MEDICARE

## 2025-06-09 VITALS
DIASTOLIC BLOOD PRESSURE: 55 MMHG | RESPIRATION RATE: 16 BRPM | TEMPERATURE: 98.3 F | SYSTOLIC BLOOD PRESSURE: 127 MMHG | HEART RATE: 76 BPM | OXYGEN SATURATION: 97 %

## 2025-06-09 DIAGNOSIS — C34.12 CANCER OF UPPER LOBE OF LEFT LUNG (HCC): Primary | ICD-10-CM

## 2025-06-09 DIAGNOSIS — C34.90 LUNG CANCER METASTATIC TO BONE (HCC): ICD-10-CM

## 2025-06-09 DIAGNOSIS — C79.51 LUNG CANCER METASTATIC TO BONE (HCC): ICD-10-CM

## 2025-06-09 LAB
ABO/RH: NORMAL
ANTIBODY SCREEN: NORMAL
BLD GP AB SCN SERPL QL: NORMAL
BLOOD BANK DISPENSE STATUS: NORMAL
BLOOD BANK PRODUCT CODE: NORMAL
BPU ID: NORMAL
DESCRIPTION BLOOD BANK: NORMAL

## 2025-06-09 PROCEDURE — 36591 DRAW BLOOD OFF VENOUS DEVICE: CPT

## 2025-06-09 PROCEDURE — 6370000000 HC RX 637 (ALT 250 FOR IP): Performed by: INTERNAL MEDICINE

## 2025-06-09 PROCEDURE — 86850 RBC ANTIBODY SCREEN: CPT

## 2025-06-09 PROCEDURE — 36430 TRANSFUSION BLD/BLD COMPNT: CPT

## 2025-06-09 PROCEDURE — 86901 BLOOD TYPING SEROLOGIC RH(D): CPT

## 2025-06-09 PROCEDURE — P9016 RBC LEUKOCYTES REDUCED: HCPCS

## 2025-06-09 PROCEDURE — 86923 COMPATIBILITY TEST ELECTRIC: CPT

## 2025-06-09 PROCEDURE — 86900 BLOOD TYPING SEROLOGIC ABO: CPT

## 2025-06-09 RX ORDER — EPINEPHRINE 1 MG/ML
0.3 INJECTION, SOLUTION INTRAMUSCULAR; SUBCUTANEOUS PRN
OUTPATIENT
Start: 2025-06-09

## 2025-06-09 RX ORDER — DIPHENHYDRAMINE HCL 25 MG
25 TABLET ORAL ONCE
Status: CANCELLED | OUTPATIENT
Start: 2025-06-09 | End: 2025-06-09

## 2025-06-09 RX ORDER — ACETAMINOPHEN 325 MG/1
650 TABLET ORAL ONCE
Status: COMPLETED | OUTPATIENT
Start: 2025-06-09 | End: 2025-06-09

## 2025-06-09 RX ORDER — HYDROCORTISONE SODIUM SUCCINATE 100 MG/2ML
100 INJECTION INTRAMUSCULAR; INTRAVENOUS
OUTPATIENT
Start: 2025-06-09

## 2025-06-09 RX ORDER — ONDANSETRON 2 MG/ML
8 INJECTION INTRAMUSCULAR; INTRAVENOUS
OUTPATIENT
Start: 2025-06-09

## 2025-06-09 RX ORDER — SODIUM CHLORIDE 9 MG/ML
25 INJECTION, SOLUTION INTRAVENOUS PRN
OUTPATIENT
Start: 2025-06-09

## 2025-06-09 RX ORDER — ACETAMINOPHEN 325 MG/1
650 TABLET ORAL
OUTPATIENT
Start: 2025-06-09

## 2025-06-09 RX ORDER — SODIUM CHLORIDE 0.9 % (FLUSH) 0.9 %
5-40 SYRINGE (ML) INJECTION PRN
OUTPATIENT
Start: 2025-06-09

## 2025-06-09 RX ORDER — DIPHENHYDRAMINE HCL 25 MG
25 TABLET ORAL ONCE
Status: COMPLETED | OUTPATIENT
Start: 2025-06-09 | End: 2025-06-09

## 2025-06-09 RX ORDER — ALBUTEROL SULFATE 90 UG/1
4 INHALANT RESPIRATORY (INHALATION) PRN
OUTPATIENT
Start: 2025-06-09

## 2025-06-09 RX ORDER — ACETAMINOPHEN 325 MG/1
650 TABLET ORAL ONCE
Status: CANCELLED | OUTPATIENT
Start: 2025-06-09 | End: 2025-06-09

## 2025-06-09 RX ORDER — DIPHENHYDRAMINE HYDROCHLORIDE 50 MG/ML
50 INJECTION, SOLUTION INTRAMUSCULAR; INTRAVENOUS
OUTPATIENT
Start: 2025-06-09

## 2025-06-09 RX ORDER — SODIUM CHLORIDE 9 MG/ML
INJECTION, SOLUTION INTRAVENOUS CONTINUOUS
OUTPATIENT
Start: 2025-06-09

## 2025-06-09 RX ORDER — SODIUM CHLORIDE 9 MG/ML
20 INJECTION, SOLUTION INTRAVENOUS CONTINUOUS
OUTPATIENT
Start: 2025-06-09

## 2025-06-09 RX ADMIN — DIPHENHYDRAMINE HCL 25 MG: 25 TABLET ORAL at 14:43

## 2025-06-09 RX ADMIN — ACETAMINOPHEN 650 MG: 325 TABLET ORAL at 14:43

## 2025-06-09 NOTE — PROGRESS NOTES
Patients HGB this AM: 6.6  Pt seen at [facility] OPO today for  Packed red blood cell transfusion  for above lab values per order. Blood product transfusion informed consent note current Pre-medications administered as ordered. Transfused per policy. Pt tolerated transfusion well and without incident.  Pt verbalizes understanding of discharge instructions.  Discharged to home per wheelchair with family.

## 2025-06-27 NOTE — PROGRESS NOTES
[facility]     Outpatient Cardiology         Patient Name:  Veronica Vargas  Primary Care Physician: Ayan Fong MD  23     Assessment & Plan    Assessment / Plan:     History of [id number] in sinus rhythm.  Off amiodarone.  Not a candidate for anticoagulation at the present time.  Metastatic lung cancer-management per oncology  Essential hypertension-controlled.  Explained to the patient that if she has further weight loss, blood pressure may decrease and may have to reduce the dose of Norvasc  Follow-up as scheduled                Chief Complaint:     Chief Complaint   Patient presents with    Essential hypertension       History of Present Illness:       HPI     Veronica Vargas is a 65 y.o. female with PMH of paroxysmal Afib, HTN, Dilated cardiomyopathy, DM and HLD here for a follow up.        Patient denies any chest pain, shortness of breath, palpitations, presyncope or syncope. No TIA. No claudication. No recent hospitalizations    PMH  Past Medical History:   Diagnosis Date    CHF (congestive heart failure) (HCC)     Chronic kidney disease     Diabetes mellitus (HCC)     Hyperlipidemia     Hypertension     MDRO (multiple drug resistant organisms) resistance 6/3/2016    E Coli-urine       PSH  Past Surgical History:   Procedure Laterality Date    AXILLARY SURGERY Left 2025    DEEP LEFT AXILLARY LYMPH NODE EXCISIONAL BIOPSY performed by Brock Campa DO at [facility] OR    COLONOSCOPY      ENDOSCOPY, COLON, DIAGNOSTIC      IR PORT PLACEMENT > 5 YEARS  2025    IR PORT PLACEMENT > 5 YEARS 2025 [facility] CARDIAC CATH/IR/NEURO LAB    TUBAL LIGATION          Social HIstory  Social History     Tobacco Use    Smoking status: Former     Current packs/day: 0.00     Types: Cigarettes     Quit date: 2016     Years since quittin.5    Smokeless tobacco: Never   Vaping Use    Vaping status: Never Used   Substance Use Topics    Alcohol use: Yes     Comment: about 1 drink

## 2025-06-27 NOTE — PATIENT INSTRUCTIONS
Thank you for choosing [facility] for your cardiac care.    During your visit today, we reviewed and confirmed your cardiac medications along with  medication prescribed by your other healthcare team members. Please be sure to discuss any  changes to medication with your providers.    Please bring a list of ALL medications (or the bottles) with you to EVERY appointment.  Also include vitamins and over-the-counter medications.    If you need refills for any cardiac medications, please call your pharmacy and they will reach out to us electronically.    Did your provider order testing today? If yes, then you will receive your results in three  possible ways. You can receive a [employer] message, a phone call, or letter in the mail. Please  note, if you are an active [employer] user, some of your testing will be available within 1-2 days.    Finally, please know that it is good for your heart to exercise and follow a healthy, low-fat diet  as advised by your physician and health care providers.    If you are experiencing a medical emergency, please call 911 immediately.    It's easy to register for a [employer] account if you don't already have one. With a [employer]  account you can manage your health record, view test results, schedule appointments and  more.     Dr. Pascual's clinical staff can be reached at the following phone number: [phone]    If any cardiac testing is ordered, please contact central scheduling at [phone] to get your test scheduled.

## 2025-07-03 ENCOUNTER — OFFICE VISIT (OUTPATIENT)
Dept: CARDIOLOGY CLINIC | Age: 65
End: 2025-07-03
Payer: MEDICARE

## 2025-07-03 VITALS
OXYGEN SATURATION: 98 % | BODY MASS INDEX: 37.44 KG/M2 | SYSTOLIC BLOOD PRESSURE: 124 MMHG | DIASTOLIC BLOOD PRESSURE: 60 MMHG | HEART RATE: 79 BPM | WEIGHT: 225 LBS

## 2025-07-03 DIAGNOSIS — I10 ESSENTIAL HYPERTENSION: ICD-10-CM

## 2025-07-03 DIAGNOSIS — I48.0 PAROXYSMAL ATRIAL FIBRILLATION (HCC): Primary | ICD-10-CM

## 2025-07-03 PROCEDURE — 99214 OFFICE O/P EST MOD 30 MIN: CPT | Performed by: INTERNAL MEDICINE

## 2025-07-03 PROCEDURE — 1159F MED LIST DOCD IN RCRD: CPT | Performed by: INTERNAL MEDICINE

## 2025-07-03 PROCEDURE — 3078F DIAST BP <80 MM HG: CPT | Performed by: INTERNAL MEDICINE

## 2025-07-03 PROCEDURE — 3074F SYST BP LT 130 MM HG: CPT | Performed by: INTERNAL MEDICINE

## 2025-07-03 PROCEDURE — 1160F RVW MEDS BY RX/DR IN RCRD: CPT | Performed by: INTERNAL MEDICINE

## 2025-07-03 PROCEDURE — 1036F TOBACCO NON-USER: CPT | Performed by: INTERNAL MEDICINE

## 2025-07-03 PROCEDURE — G8427 DOCREV CUR MEDS BY ELIG CLIN: HCPCS | Performed by: INTERNAL MEDICINE

## 2025-07-03 PROCEDURE — 3017F COLORECTAL CA SCREEN DOC REV: CPT | Performed by: INTERNAL MEDICINE

## 2025-07-03 PROCEDURE — 1090F PRES/ABSN URINE INCON ASSESS: CPT | Performed by: INTERNAL MEDICINE

## 2025-07-03 PROCEDURE — G8417 CALC BMI ABV UP PARAM F/U: HCPCS | Performed by: INTERNAL MEDICINE

## 2025-07-03 PROCEDURE — 1123F ACP DISCUSS/DSCN MKR DOCD: CPT | Performed by: INTERNAL MEDICINE

## 2025-07-03 PROCEDURE — 93000 ELECTROCARDIOGRAM COMPLETE: CPT | Performed by: INTERNAL MEDICINE

## 2025-07-03 PROCEDURE — G8400 PT W/DXA NO RESULTS DOC: HCPCS | Performed by: INTERNAL MEDICINE

## 2025-07-21 ENCOUNTER — HOSPITAL ENCOUNTER (OUTPATIENT)
Dept: ONCOLOGY | Age: 65
Setting detail: INFUSION SERIES
Discharge: HOME | End: 2025-07-21
Payer: MEDICARE

## 2025-07-21 DIAGNOSIS — C79.51 LUNG CANCER METASTATIC TO BONE (HCC): ICD-10-CM

## 2025-07-21 DIAGNOSIS — C34.12 CANCER OF UPPER LOBE OF LEFT LUNG (HCC): Primary | ICD-10-CM

## 2025-07-21 DIAGNOSIS — C34.90 LUNG CANCER METASTATIC TO BONE (HCC): ICD-10-CM

## 2025-07-21 LAB
ABO/RH: NORMAL
ANTIBODY SCREEN: NORMAL
BLD GP AB SCN SERPL QL: NORMAL

## 2025-07-21 PROCEDURE — 86923 COMPATIBILITY TEST ELECTRIC: CPT

## 2025-07-21 PROCEDURE — 86850 RBC ANTIBODY SCREEN: CPT

## 2025-07-21 PROCEDURE — 86901 BLOOD TYPING SEROLOGIC RH(D): CPT

## 2025-07-21 PROCEDURE — 86900 BLOOD TYPING SEROLOGIC ABO: CPT

## 2025-07-21 PROCEDURE — P9016 RBC LEUKOCYTES REDUCED: HCPCS

## 2025-07-21 PROCEDURE — 36591 DRAW BLOOD OFF VENOUS DEVICE: CPT

## 2025-07-21 RX ORDER — ACETAMINOPHEN 325 MG/1
650 TABLET ORAL ONCE
Status: CANCELLED | OUTPATIENT
Start: 2025-07-21 | End: 2025-07-21

## 2025-07-21 RX ORDER — DIPHENHYDRAMINE HCL 25 MG
25 TABLET ORAL ONCE
Status: CANCELLED | OUTPATIENT
Start: 2025-07-21 | End: 2025-07-21

## 2025-07-21 RX ORDER — ALBUTEROL SULFATE 90 UG/1
4 INHALANT RESPIRATORY (INHALATION) PRN
OUTPATIENT
Start: 2025-07-21

## 2025-07-21 RX ORDER — SODIUM CHLORIDE 9 MG/ML
25 INJECTION, SOLUTION INTRAVENOUS PRN
Status: CANCELLED | OUTPATIENT
Start: 2025-07-21

## 2025-07-21 RX ORDER — SODIUM CHLORIDE 9 MG/ML
INJECTION, SOLUTION INTRAVENOUS CONTINUOUS
OUTPATIENT
Start: 2025-07-21

## 2025-07-21 RX ORDER — ACETAMINOPHEN 325 MG/1
650 TABLET ORAL
OUTPATIENT
Start: 2025-07-21

## 2025-07-21 RX ORDER — SODIUM CHLORIDE 9 MG/ML
25 INJECTION, SOLUTION INTRAVENOUS PRN
OUTPATIENT
Start: 2025-07-21

## 2025-07-21 RX ORDER — SODIUM CHLORIDE 9 MG/ML
20 INJECTION, SOLUTION INTRAVENOUS CONTINUOUS
OUTPATIENT
Start: 2025-07-21

## 2025-07-21 RX ORDER — HYDROCORTISONE SODIUM SUCCINATE 100 MG/2ML
100 INJECTION INTRAMUSCULAR; INTRAVENOUS
OUTPATIENT
Start: 2025-07-21

## 2025-07-21 RX ORDER — SODIUM CHLORIDE 0.9 % (FLUSH) 0.9 %
5-40 SYRINGE (ML) INJECTION PRN
Status: CANCELLED | OUTPATIENT
Start: 2025-07-21

## 2025-07-21 RX ORDER — DIPHENHYDRAMINE HYDROCHLORIDE 50 MG/ML
50 INJECTION, SOLUTION INTRAMUSCULAR; INTRAVENOUS
OUTPATIENT
Start: 2025-07-21

## 2025-07-21 RX ORDER — SODIUM CHLORIDE 0.9 % (FLUSH) 0.9 %
5-40 SYRINGE (ML) INJECTION PRN
OUTPATIENT
Start: 2025-07-21

## 2025-07-21 RX ORDER — ONDANSETRON 2 MG/ML
8 INJECTION INTRAMUSCULAR; INTRAVENOUS
OUTPATIENT
Start: 2025-07-21

## 2025-07-21 RX ORDER — SODIUM CHLORIDE 9 MG/ML
20 INJECTION, SOLUTION INTRAVENOUS CONTINUOUS
Status: CANCELLED | OUTPATIENT
Start: 2025-07-21

## 2025-07-21 RX ORDER — EPINEPHRINE 1 MG/ML
0.3 INJECTION, SOLUTION INTRAMUSCULAR; SUBCUTANEOUS PRN
OUTPATIENT
Start: 2025-07-21

## 2025-07-22 ENCOUNTER — HOSPITAL ENCOUNTER (OUTPATIENT)
Dept: ONCOLOGY | Age: 65
Setting detail: INFUSION SERIES
Discharge: HOME | End: 2025-07-22
Payer: MEDICARE

## 2025-07-22 VITALS
HEART RATE: 57 BPM | TEMPERATURE: 98 F | OXYGEN SATURATION: 98 % | DIASTOLIC BLOOD PRESSURE: 66 MMHG | SYSTOLIC BLOOD PRESSURE: 133 MMHG | RESPIRATION RATE: 17 BRPM

## 2025-07-22 DIAGNOSIS — C34.90 LUNG CANCER METASTATIC TO BONE (HCC): ICD-10-CM

## 2025-07-22 DIAGNOSIS — C34.12 CANCER OF UPPER LOBE OF LEFT LUNG (HCC): Primary | ICD-10-CM

## 2025-07-22 DIAGNOSIS — C79.51 LUNG CANCER METASTATIC TO BONE (HCC): ICD-10-CM

## 2025-07-22 LAB
BLOOD BANK DISPENSE STATUS: NORMAL
BLOOD BANK PRODUCT CODE: NORMAL
BPU ID: NORMAL
DESCRIPTION BLOOD BANK: NORMAL

## 2025-07-22 PROCEDURE — 6370000000 HC RX 637 (ALT 250 FOR IP): Performed by: INTERNAL MEDICINE

## 2025-07-22 PROCEDURE — 36430 TRANSFUSION BLD/BLD COMPNT: CPT

## 2025-07-22 RX ORDER — DIPHENHYDRAMINE HCL 25 MG
25 TABLET ORAL ONCE
OUTPATIENT
Start: 2025-07-22 | End: 2025-07-22

## 2025-07-22 RX ORDER — ACETAMINOPHEN 325 MG/1
650 TABLET ORAL ONCE
OUTPATIENT
Start: 2025-07-22 | End: 2025-07-22

## 2025-07-22 RX ORDER — DIPHENHYDRAMINE HYDROCHLORIDE 50 MG/ML
50 INJECTION, SOLUTION INTRAMUSCULAR; INTRAVENOUS
OUTPATIENT
Start: 2025-07-22

## 2025-07-22 RX ORDER — ALBUTEROL SULFATE 90 UG/1
4 INHALANT RESPIRATORY (INHALATION) PRN
OUTPATIENT
Start: 2025-07-22

## 2025-07-22 RX ORDER — SODIUM CHLORIDE 9 MG/ML
20 INJECTION, SOLUTION INTRAVENOUS CONTINUOUS
OUTPATIENT
Start: 2025-07-22

## 2025-07-22 RX ORDER — SODIUM CHLORIDE 9 MG/ML
INJECTION, SOLUTION INTRAVENOUS CONTINUOUS
OUTPATIENT
Start: 2025-07-22

## 2025-07-22 RX ORDER — SODIUM CHLORIDE 9 MG/ML
25 INJECTION, SOLUTION INTRAVENOUS PRN
OUTPATIENT
Start: 2025-07-22

## 2025-07-22 RX ORDER — ACETAMINOPHEN 325 MG/1
650 TABLET ORAL
OUTPATIENT
Start: 2025-07-22

## 2025-07-22 RX ORDER — ONDANSETRON 2 MG/ML
8 INJECTION INTRAMUSCULAR; INTRAVENOUS
OUTPATIENT
Start: 2025-07-22

## 2025-07-22 RX ORDER — ACETAMINOPHEN 325 MG/1
650 TABLET ORAL ONCE
Status: COMPLETED | OUTPATIENT
Start: 2025-07-22 | End: 2025-07-22

## 2025-07-22 RX ORDER — DIPHENHYDRAMINE HCL 25 MG
25 TABLET ORAL ONCE
Status: COMPLETED | OUTPATIENT
Start: 2025-07-22 | End: 2025-07-22

## 2025-07-22 RX ORDER — EPINEPHRINE 1 MG/ML
0.3 INJECTION, SOLUTION INTRAMUSCULAR; SUBCUTANEOUS PRN
OUTPATIENT
Start: 2025-07-22

## 2025-07-22 RX ORDER — HYDROCORTISONE SODIUM SUCCINATE 100 MG/2ML
100 INJECTION INTRAMUSCULAR; INTRAVENOUS
OUTPATIENT
Start: 2025-07-22

## 2025-07-22 RX ORDER — SODIUM CHLORIDE 0.9 % (FLUSH) 0.9 %
5-40 SYRINGE (ML) INJECTION PRN
OUTPATIENT
Start: 2025-07-22

## 2025-07-22 RX ADMIN — ACETAMINOPHEN 650 MG: 325 TABLET ORAL at 10:13

## 2025-07-22 RX ADMIN — DIPHENHYDRAMINE HCL 25 MG: 25 TABLET ORAL at 10:13

## 2025-07-22 NOTE — PROGRESS NOTES
Patients HGB this AM: 6.4  Pt seen at [facility] OPO today for  Packed red blood cell transfusion  for above lab values per order. Blood product transfusion informed consent note current Pre-medications administered as ordered. Transfused per policy. Pt tolerated transfusion well and without incident.  Pt verbalizes understanding of discharge instructions.  Discharged to home with family.

## 2025-07-28 ENCOUNTER — HOSPITAL ENCOUNTER (OUTPATIENT)
Dept: MRI IMAGING | Age: 65
Discharge: HOME | End: 2025-07-28
Attending: INTERNAL MEDICINE
Payer: MEDICARE

## 2025-07-28 ENCOUNTER — HOSPITAL ENCOUNTER (OUTPATIENT)
Dept: CT IMAGING | Age: 65
Discharge: HOME | End: 2025-07-28
Attending: INTERNAL MEDICINE
Payer: MEDICARE

## 2025-07-28 DIAGNOSIS — C34.12 MALIGNANT NEOPLASM OF UPPER LOBE, LEFT BRONCHUS OR LUNG (HCC): ICD-10-CM

## 2025-07-28 PROCEDURE — 70553 MRI BRAIN STEM W/O & W/DYE: CPT

## 2025-07-28 PROCEDURE — 6360000004 HC RX CONTRAST MEDICATION: Performed by: INTERNAL MEDICINE

## 2025-07-28 PROCEDURE — 74176 CT ABD & PELVIS W/O CONTRAST: CPT

## 2025-07-28 PROCEDURE — A9579 GAD-BASE MR CONTRAST NOS,1ML: HCPCS | Performed by: INTERNAL MEDICINE

## 2025-07-28 RX ORDER — GADOTERIDOL 279.3 MG/ML
20 INJECTION INTRAVENOUS
Status: COMPLETED | OUTPATIENT
Start: 2025-07-28 | End: 2025-07-28

## 2025-07-28 RX ADMIN — GADOTERIDOL 20 ML: 279.3 INJECTION, SOLUTION INTRAVENOUS at 10:42

## 2025-08-08 ENCOUNTER — TELEPHONE (OUTPATIENT)
Dept: CARDIOLOGY CLINIC | Age: 65
End: 2025-08-08

## 2025-08-08 RX ORDER — ISOSORBIDE MONONITRATE 60 MG/1
60 TABLET, EXTENDED RELEASE ORAL DAILY
Qty: 90 TABLET | Refills: 3 | Status: SHIPPED | OUTPATIENT
Start: 2025-08-08

## (undated) DEVICE — APPLICATOR MEDICATED 26 CC SOLUTION HI LT ORNG CHLORAPREP

## (undated) DEVICE — TOWEL,STOP FLAG GOLD N-W: Brand: MEDLINE

## (undated) DEVICE — CLEANER,CAUTERY TIP,2X2",STERILE: Brand: MEDLINE

## (undated) DEVICE — BANDAGE COBAN 4 IN COMPR W4INXL5YD FOAM COHESIVE QUIK STK SELF ADH SFT

## (undated) DEVICE — GLOVE ORANGE PI 7   MSG9070

## (undated) DEVICE — GENERAL: Brand: MEDLINE INDUSTRIES, INC.

## (undated) DEVICE — SUTURE VICRYL + SZ 2-0 L18IN ABSRB VLT L26MM SH 1/2 CIR VCP775D

## (undated) DEVICE — GLOVE SURG SZ 75 L12IN THK75MIL DK GRN LTX FREE

## (undated) DEVICE — BLADE ES ELASTOMERIC COAT INSUL DURABLE BEND UPTO 90DEG

## (undated) DEVICE — SUTURE VICRYL + SZ 3-0 L18IN ABSRB UD SH 1/2 CIR TAPERCUT NDL VCP864D

## (undated) DEVICE — NEPTUNE E-SEP SMOKE EVACUATION PENCIL, COATED, 70MM BLADE, PUSH BUTTON SWITCH: Brand: NEPTUNE E-SEP

## (undated) DEVICE — SUTURE VICRYL SZ 4-0 L18IN ABSRB UD L19MM PS-2 3/8 CIR PRIM J496H

## (undated) DEVICE — TRAP FLUID